# Patient Record
Sex: FEMALE | Race: BLACK OR AFRICAN AMERICAN | NOT HISPANIC OR LATINO | Employment: FULL TIME | URBAN - METROPOLITAN AREA
[De-identification: names, ages, dates, MRNs, and addresses within clinical notes are randomized per-mention and may not be internally consistent; named-entity substitution may affect disease eponyms.]

---

## 2021-07-03 ENCOUNTER — HOSPITAL ENCOUNTER (INPATIENT)
Facility: HOSPITAL | Age: 31
LOS: 8 days | Discharge: HOME | End: 2021-07-12
Attending: EMERGENCY MEDICINE | Admitting: INTERNAL MEDICINE

## 2021-07-03 ENCOUNTER — APPOINTMENT (OUTPATIENT)
Dept: CT IMAGING | Facility: HOSPITAL | Age: 31
End: 2021-07-03

## 2021-07-03 DIAGNOSIS — R10.30 LOWER ABDOMINAL PAIN: ICD-10-CM

## 2021-07-03 DIAGNOSIS — Z90.710 STATUS POST HYSTERECTOMY: ICD-10-CM

## 2021-07-03 DIAGNOSIS — T81.43XA INTRA-ABDOMINAL ABSCESS POST-PROCEDURE: Primary | ICD-10-CM

## 2021-07-03 LAB
ALBUMIN SERPL-MCNC: 3.6 G/DL (ref 3.5–5.2)
ALBUMIN/GLOB SERPL: 1 G/DL
ALP SERPL-CCNC: 77 U/L (ref 39–117)
ALT SERPL W P-5'-P-CCNC: 9 U/L (ref 1–33)
ANION GAP SERPL CALCULATED.3IONS-SCNC: 11 MMOL/L (ref 5–15)
AST SERPL-CCNC: 13 U/L (ref 1–32)
BASOPHILS # BLD AUTO: 0.03 10*3/MM3 (ref 0–0.2)
BASOPHILS NFR BLD AUTO: 0.2 % (ref 0–1.5)
BILIRUB SERPL-MCNC: 1 MG/DL (ref 0–1.2)
BUN SERPL-MCNC: 6 MG/DL (ref 6–20)
BUN/CREAT SERPL: 8.3 (ref 7–25)
CALCIUM SPEC-SCNC: 9.6 MG/DL (ref 8.6–10.5)
CHLORIDE SERPL-SCNC: 100 MMOL/L (ref 98–107)
CO2 SERPL-SCNC: 28 MMOL/L (ref 22–29)
CREAT SERPL-MCNC: 0.72 MG/DL (ref 0.57–1)
DEPRECATED RDW RBC AUTO: 43 FL (ref 37–54)
EOSINOPHIL # BLD AUTO: 0.18 10*3/MM3 (ref 0–0.4)
EOSINOPHIL NFR BLD AUTO: 1.3 % (ref 0.3–6.2)
ERYTHROCYTE [DISTWIDTH] IN BLOOD BY AUTOMATED COUNT: 12.4 % (ref 12.3–15.4)
GFR SERPL CREATININE-BSD FRML MDRD: 114 ML/MIN/1.73
GLOBULIN UR ELPH-MCNC: 3.6 GM/DL
GLUCOSE SERPL-MCNC: 99 MG/DL (ref 65–99)
HCT VFR BLD AUTO: 37.2 % (ref 34–46.6)
HGB BLD-MCNC: 12.2 G/DL (ref 12–15.9)
HOLD SPECIMEN: NORMAL
HOLD SPECIMEN: NORMAL
IMM GRANULOCYTES # BLD AUTO: 0.09 10*3/MM3 (ref 0–0.05)
IMM GRANULOCYTES NFR BLD AUTO: 0.6 % (ref 0–0.5)
LIPASE SERPL-CCNC: 11 U/L (ref 13–60)
LYMPHOCYTES # BLD AUTO: 0.48 10*3/MM3 (ref 0.7–3.1)
LYMPHOCYTES NFR BLD AUTO: 3.3 % (ref 19.6–45.3)
MCH RBC QN AUTO: 30.7 PG (ref 26.6–33)
MCHC RBC AUTO-ENTMCNC: 32.8 G/DL (ref 31.5–35.7)
MCV RBC AUTO: 93.5 FL (ref 79–97)
MONOCYTES # BLD AUTO: 0.71 10*3/MM3 (ref 0.1–0.9)
MONOCYTES NFR BLD AUTO: 4.9 % (ref 5–12)
NEUTROPHILS NFR BLD AUTO: 12.91 10*3/MM3 (ref 1.7–7)
NEUTROPHILS NFR BLD AUTO: 89.7 % (ref 42.7–76)
NRBC BLD AUTO-RTO: 0 /100 WBC (ref 0–0.2)
PLATELET # BLD AUTO: 175 10*3/MM3 (ref 140–450)
PMV BLD AUTO: 11.2 FL (ref 6–12)
POTASSIUM SERPL-SCNC: 3.5 MMOL/L (ref 3.5–5.2)
PROT SERPL-MCNC: 7.2 G/DL (ref 6–8.5)
RBC # BLD AUTO: 3.98 10*6/MM3 (ref 3.77–5.28)
SODIUM SERPL-SCNC: 139 MMOL/L (ref 136–145)
WBC # BLD AUTO: 14.4 10*3/MM3 (ref 3.4–10.8)
WHOLE BLOOD HOLD SPECIMEN: NORMAL

## 2021-07-03 PROCEDURE — 25010000002 IOPAMIDOL 61 % SOLUTION: Performed by: EMERGENCY MEDICINE

## 2021-07-03 PROCEDURE — 83690 ASSAY OF LIPASE: CPT | Performed by: EMERGENCY MEDICINE

## 2021-07-03 PROCEDURE — 80053 COMPREHEN METABOLIC PANEL: CPT | Performed by: EMERGENCY MEDICINE

## 2021-07-03 PROCEDURE — 85025 COMPLETE CBC W/AUTO DIFF WBC: CPT | Performed by: EMERGENCY MEDICINE

## 2021-07-03 PROCEDURE — 83605 ASSAY OF LACTIC ACID: CPT | Performed by: EMERGENCY MEDICINE

## 2021-07-03 PROCEDURE — 99284 EMERGENCY DEPT VISIT MOD MDM: CPT

## 2021-07-03 PROCEDURE — 84145 PROCALCITONIN (PCT): CPT | Performed by: EMERGENCY MEDICINE

## 2021-07-03 PROCEDURE — 74177 CT ABD & PELVIS W/CONTRAST: CPT

## 2021-07-03 PROCEDURE — 25010000002 ONDANSETRON PER 1 MG: Performed by: EMERGENCY MEDICINE

## 2021-07-03 PROCEDURE — 25010000002 HYDROMORPHONE PER 4 MG: Performed by: EMERGENCY MEDICINE

## 2021-07-03 RX ORDER — ONDANSETRON 2 MG/ML
4 INJECTION INTRAMUSCULAR; INTRAVENOUS
Status: DISCONTINUED | OUTPATIENT
Start: 2021-07-03 | End: 2021-07-04 | Stop reason: SDUPTHER

## 2021-07-03 RX ORDER — HYDROMORPHONE HYDROCHLORIDE 1 MG/ML
0.5 INJECTION, SOLUTION INTRAMUSCULAR; INTRAVENOUS; SUBCUTANEOUS
Status: DISCONTINUED | OUTPATIENT
Start: 2021-07-03 | End: 2021-07-04 | Stop reason: HOSPADM

## 2021-07-03 RX ORDER — SODIUM CHLORIDE 9 MG/ML
10 INJECTION INTRAVENOUS AS NEEDED
Status: DISCONTINUED | OUTPATIENT
Start: 2021-07-03 | End: 2021-07-09

## 2021-07-03 RX ADMIN — SODIUM CHLORIDE 1000 ML: 9 INJECTION, SOLUTION INTRAVENOUS at 23:06

## 2021-07-03 RX ADMIN — HYDROMORPHONE HYDROCHLORIDE 0.5 MG: 1 INJECTION, SOLUTION INTRAMUSCULAR; INTRAVENOUS; SUBCUTANEOUS at 23:16

## 2021-07-03 RX ADMIN — ONDANSETRON 4 MG: 2 INJECTION INTRAMUSCULAR; INTRAVENOUS at 23:08

## 2021-07-03 RX ADMIN — IOPAMIDOL 100 ML: 612 INJECTION, SOLUTION INTRAVENOUS at 23:33

## 2021-07-04 PROBLEM — T81.43XA INTRA-ABDOMINAL ABSCESS POST-PROCEDURE: Status: ACTIVE | Noted: 2021-07-04

## 2021-07-04 PROBLEM — K65.1 INTRA-ABDOMINAL ABSCESS POST-PROCEDURE: Status: ACTIVE | Noted: 2021-07-04

## 2021-07-04 LAB
ABO GROUP BLD: NORMAL
APTT PPP: 29.9 SECONDS (ref 22–39)
BACTERIA UR QL AUTO: ABNORMAL /HPF
BILIRUB UR QL STRIP: NEGATIVE
BLD GP AB SCN SERPL QL: NEGATIVE
CLARITY UR: ABNORMAL
COLOR UR: YELLOW
D-LACTATE SERPL-SCNC: 0.8 MMOL/L (ref 0.5–2)
GLUCOSE UR STRIP-MCNC: NEGATIVE MG/DL
HGB UR QL STRIP.AUTO: NEGATIVE
HOLD SPECIMEN: NORMAL
HYALINE CASTS UR QL AUTO: ABNORMAL /LPF
INR PPP: 1.22 (ref 0.85–1.16)
KETONES UR QL STRIP: NEGATIVE
LEUKOCYTE ESTERASE UR QL STRIP.AUTO: NEGATIVE
NITRITE UR QL STRIP: NEGATIVE
PH UR STRIP.AUTO: 6 [PH] (ref 5–8)
PROCALCITONIN SERPL-MCNC: 0.3 NG/ML (ref 0–0.25)
PROT UR QL STRIP: NEGATIVE
PROTHROMBIN TIME: 15 SECONDS (ref 11.4–14.4)
RBC # UR: ABNORMAL /HPF
REF LAB TEST METHOD: ABNORMAL
RH BLD: POSITIVE
SARS-COV-2 RNA RESP QL NAA+PROBE: NOT DETECTED
SP GR UR STRIP: 1.03 (ref 1–1.03)
SQUAMOUS #/AREA URNS HPF: ABNORMAL /HPF
T&S EXPIRATION DATE: NORMAL
UROBILINOGEN UR QL STRIP: ABNORMAL
WBC UR QL AUTO: ABNORMAL /HPF

## 2021-07-04 PROCEDURE — 25010000002 PIPERACILLIN SOD-TAZOBACTAM PER 1 G: Performed by: EMERGENCY MEDICINE

## 2021-07-04 PROCEDURE — 25010000002 ONDANSETRON PER 1 MG: Performed by: INTERNAL MEDICINE

## 2021-07-04 PROCEDURE — 87040 BLOOD CULTURE FOR BACTERIA: CPT | Performed by: INTERNAL MEDICINE

## 2021-07-04 PROCEDURE — 25010000002 HYDROMORPHONE PER 4 MG: Performed by: INTERNAL MEDICINE

## 2021-07-04 PROCEDURE — 25010000002 HYDROMORPHONE PER 4 MG: Performed by: EMERGENCY MEDICINE

## 2021-07-04 PROCEDURE — 99253 IP/OBS CNSLTJ NEW/EST LOW 45: CPT | Performed by: OBSTETRICS & GYNECOLOGY

## 2021-07-04 PROCEDURE — 86850 RBC ANTIBODY SCREEN: CPT | Performed by: INTERNAL MEDICINE

## 2021-07-04 PROCEDURE — 86900 BLOOD TYPING SEROLOGIC ABO: CPT | Performed by: INTERNAL MEDICINE

## 2021-07-04 PROCEDURE — 99223 1ST HOSP IP/OBS HIGH 75: CPT | Performed by: INTERNAL MEDICINE

## 2021-07-04 PROCEDURE — 85610 PROTHROMBIN TIME: CPT | Performed by: INTERNAL MEDICINE

## 2021-07-04 PROCEDURE — 25010000002 VANCOMYCIN 10 G RECONSTITUTED SOLUTION

## 2021-07-04 PROCEDURE — 25010000002 VANCOMYCIN 10 G RECONSTITUTED SOLUTION: Performed by: EMERGENCY MEDICINE

## 2021-07-04 PROCEDURE — U0003 INFECTIOUS AGENT DETECTION BY NUCLEIC ACID (DNA OR RNA); SEVERE ACUTE RESPIRATORY SYNDROME CORONAVIRUS 2 (SARS-COV-2) (CORONAVIRUS DISEASE [COVID-19]), AMPLIFIED PROBE TECHNIQUE, MAKING USE OF HIGH THROUGHPUT TECHNOLOGIES AS DESCRIBED BY CMS-2020-01-R: HCPCS | Performed by: INTERNAL MEDICINE

## 2021-07-04 PROCEDURE — 25010000002 PIPERACILLIN SOD-TAZOBACTAM PER 1 G: Performed by: INTERNAL MEDICINE

## 2021-07-04 PROCEDURE — 81001 URINALYSIS AUTO W/SCOPE: CPT | Performed by: INTERNAL MEDICINE

## 2021-07-04 PROCEDURE — 86901 BLOOD TYPING SEROLOGIC RH(D): CPT | Performed by: INTERNAL MEDICINE

## 2021-07-04 PROCEDURE — 85730 THROMBOPLASTIN TIME PARTIAL: CPT | Performed by: INTERNAL MEDICINE

## 2021-07-04 RX ORDER — SODIUM CHLORIDE 0.9 % (FLUSH) 0.9 %
10 SYRINGE (ML) INJECTION EVERY 12 HOURS SCHEDULED
Status: DISCONTINUED | OUTPATIENT
Start: 2021-07-04 | End: 2021-07-09

## 2021-07-04 RX ORDER — ACETAMINOPHEN 160 MG/5ML
650 SOLUTION ORAL EVERY 4 HOURS PRN
Status: DISCONTINUED | OUTPATIENT
Start: 2021-07-04 | End: 2021-07-09

## 2021-07-04 RX ORDER — SODIUM CHLORIDE 9 MG/ML
100 INJECTION, SOLUTION INTRAVENOUS CONTINUOUS
Status: DISCONTINUED | OUTPATIENT
Start: 2021-07-04 | End: 2021-07-11

## 2021-07-04 RX ORDER — HYDROMORPHONE HYDROCHLORIDE 1 MG/ML
0.5 INJECTION, SOLUTION INTRAMUSCULAR; INTRAVENOUS; SUBCUTANEOUS
Status: DISCONTINUED | OUTPATIENT
Start: 2021-07-04 | End: 2021-07-10 | Stop reason: SDUPTHER

## 2021-07-04 RX ORDER — ACETAMINOPHEN 650 MG/1
650 SUPPOSITORY RECTAL EVERY 4 HOURS PRN
Status: DISCONTINUED | OUTPATIENT
Start: 2021-07-04 | End: 2021-07-09

## 2021-07-04 RX ORDER — ONDANSETRON 4 MG/1
4 TABLET, FILM COATED ORAL EVERY 6 HOURS PRN
Status: DISCONTINUED | OUTPATIENT
Start: 2021-07-04 | End: 2021-07-09

## 2021-07-04 RX ORDER — ACETAMINOPHEN 325 MG/1
650 TABLET ORAL EVERY 4 HOURS PRN
Status: DISCONTINUED | OUTPATIENT
Start: 2021-07-04 | End: 2021-07-09

## 2021-07-04 RX ORDER — ONDANSETRON 2 MG/ML
4 INJECTION INTRAMUSCULAR; INTRAVENOUS EVERY 6 HOURS PRN
Status: DISCONTINUED | OUTPATIENT
Start: 2021-07-04 | End: 2021-07-09

## 2021-07-04 RX ORDER — SODIUM CHLORIDE 0.9 % (FLUSH) 0.9 %
1-10 SYRINGE (ML) INJECTION AS NEEDED
Status: DISCONTINUED | OUTPATIENT
Start: 2021-07-04 | End: 2021-07-09

## 2021-07-04 RX ADMIN — SODIUM CHLORIDE, PRESERVATIVE FREE 10 ML: 5 INJECTION INTRAVENOUS at 20:30

## 2021-07-04 RX ADMIN — SODIUM CHLORIDE, PRESERVATIVE FREE 10 ML: 5 INJECTION INTRAVENOUS at 09:01

## 2021-07-04 RX ADMIN — HYDROMORPHONE HYDROCHLORIDE 0.5 MG: 1 INJECTION, SOLUTION INTRAMUSCULAR; INTRAVENOUS; SUBCUTANEOUS at 04:53

## 2021-07-04 RX ADMIN — SODIUM CHLORIDE, PRESERVATIVE FREE 10 ML: 5 INJECTION INTRAVENOUS at 12:43

## 2021-07-04 RX ADMIN — HYDROMORPHONE HYDROCHLORIDE 0.5 MG: 1 INJECTION, SOLUTION INTRAMUSCULAR; INTRAVENOUS; SUBCUTANEOUS at 06:48

## 2021-07-04 RX ADMIN — HYDROMORPHONE HYDROCHLORIDE 0.5 MG: 1 INJECTION, SOLUTION INTRAMUSCULAR; INTRAVENOUS; SUBCUTANEOUS at 20:30

## 2021-07-04 RX ADMIN — TAZOBACTAM SODIUM AND PIPERACILLIN SODIUM 4.5 G: 500; 4 INJECTION, SOLUTION INTRAVENOUS at 20:30

## 2021-07-04 RX ADMIN — HYDROMORPHONE HYDROCHLORIDE 0.5 MG: 1 INJECTION, SOLUTION INTRAMUSCULAR; INTRAVENOUS; SUBCUTANEOUS at 16:38

## 2021-07-04 RX ADMIN — TAZOBACTAM SODIUM AND PIPERACILLIN SODIUM 3.38 G: 375; 3 INJECTION, SOLUTION INTRAVENOUS at 00:27

## 2021-07-04 RX ADMIN — VANCOMYCIN HYDROCHLORIDE 1250 MG: 100 INJECTION, POWDER, LYOPHILIZED, FOR SOLUTION INTRAVENOUS at 11:01

## 2021-07-04 RX ADMIN — ACETAMINOPHEN 650 MG: 325 TABLET, FILM COATED ORAL at 04:54

## 2021-07-04 RX ADMIN — SODIUM CHLORIDE, PRESERVATIVE FREE 10 ML: 5 INJECTION INTRAVENOUS at 16:38

## 2021-07-04 RX ADMIN — VANCOMYCIN HYDROCHLORIDE 1750 MG: 100 INJECTION, POWDER, LYOPHILIZED, FOR SOLUTION INTRAVENOUS at 00:50

## 2021-07-04 RX ADMIN — HYDROMORPHONE HYDROCHLORIDE 0.5 MG: 1 INJECTION, SOLUTION INTRAMUSCULAR; INTRAVENOUS; SUBCUTANEOUS at 12:43

## 2021-07-04 RX ADMIN — TAZOBACTAM SODIUM AND PIPERACILLIN SODIUM 4.5 G: 500; 4 INJECTION, SOLUTION INTRAVENOUS at 11:01

## 2021-07-04 RX ADMIN — HYDROMORPHONE HYDROCHLORIDE 0.5 MG: 1 INJECTION, SOLUTION INTRAMUSCULAR; INTRAVENOUS; SUBCUTANEOUS at 09:01

## 2021-07-04 RX ADMIN — HYDROMORPHONE HYDROCHLORIDE 0.5 MG: 1 INJECTION, SOLUTION INTRAMUSCULAR; INTRAVENOUS; SUBCUTANEOUS at 02:38

## 2021-07-04 RX ADMIN — HYDROMORPHONE HYDROCHLORIDE 0.5 MG: 1 INJECTION, SOLUTION INTRAMUSCULAR; INTRAVENOUS; SUBCUTANEOUS at 22:44

## 2021-07-04 RX ADMIN — SODIUM CHLORIDE 100 ML/HR: 9 INJECTION, SOLUTION INTRAVENOUS at 11:07

## 2021-07-04 RX ADMIN — ACETAMINOPHEN 650 MG: 325 TABLET, FILM COATED ORAL at 20:30

## 2021-07-04 RX ADMIN — ONDANSETRON 4 MG: 2 INJECTION INTRAMUSCULAR; INTRAVENOUS at 13:56

## 2021-07-04 RX ADMIN — TAZOBACTAM SODIUM AND PIPERACILLIN SODIUM 4.5 G: 500; 4 INJECTION, SOLUTION INTRAVENOUS at 04:58

## 2021-07-04 RX ADMIN — ACETAMINOPHEN 650 MG: 325 TABLET, FILM COATED ORAL at 11:22

## 2021-07-04 NOTE — ED PROVIDER NOTES
[place]    EMERGENCY DEPARTMENT ENCOUNTER      Pt Name: Anisa Sosa  MRN: [id number]  YOB: 1990  Date of evaluation: 7/3/2021  Provider: Lito Machuca DO    CHIEF COMPLAINT       Chief Complaint   Patient presents with   • Abdominal Pain         HISTORY OF PRESENT ILLNESS  (Location/Symptom, Timing/Onset, Context/Setting, Quality, Duration, Modifying Factors, Severity.)   Anisa Sosa is a 31 y.o. female who presents to the emergency department for evaluation of increasing lower abdominal pain and she is status post a hysterectomy with cervical removal secondary to enlarged uterus.  This was removed on May 27 with laparoscopic and intravaginal procedure technique.  This was done by patient's OB/GYN in [facility], Dr. Fatmata Varma.  She had been doing well up until last week started having some increasing abdominal pain, decreased bowel movements she was seen at an outside hospital in [place] with blood work and CT scan imaging which she notes revealed some inflammatory changes around the surgical site was started on pain control, nausea control and antibiotics but the patient symptoms continue to progress.  She denies any fevers or chills, but she notes that pain even with the medications is almost unbearable.  Decreased bowel movements over the last few days as well.  No falls, injury or trauma.  She does endorse any pain with trying to use the bathroom, no other significant abdominal surgeries.  She denies any chest pain, shortness of breath.  She has no other acute systemic complaints this time.      Nursing notes were reviewed.    REVIEW OF SYSTEMS    (2-9 systems for level 4, 10 or more for level 5)   ROS:  General:  No fevers, no chills, no weakness  Cardiovascular:  No chest pain, no palpitations  Respiratory:  No shortness of breath, no cough, no wheezing  Gastrointestinal: Positive lower abdominal pain, positive nausea, no vomiting, positive constipation, no  "diarrhea  Musculoskeletal:  No muscle pain, no joint pain  Skin:  No rash  Neurologic:  No headache  Psychiatric:  No anxiety  Genitourinary:  + dysuria, no hematuria    Except as noted above the remainder of the review of systems was reviewed and negative.       PAST MEDICAL HISTORY   No past medical history on file.      SURGICAL HISTORY     No past surgical history on file.      CURRENT MEDICATIONS       Current Facility-Administered Medications:   •  HYDROmorphone (DILAUDID) injection 0.5 mg, 0.5 mg, Intravenous, Q30 Min PRN, Lito Machuca DO, 0.5 mg at 07/03/21 2316  •  ondansetron (ZOFRAN) injection 4 mg, 4 mg, Intravenous, Q30 Min PRN, Lito Machuca DO, 4 mg at 07/03/21 2308  •  Sodium Chloride (PF) 0.9 % 10 mL, 10 mL, Intravenous, PRN, Lito Machuca DO  •  vancomycin 1750 mg/500 mL 0.9% NS IVPB ([facility]), 20 mg/kg, Intravenous, Once, Lito Machuca DO, 1,750 mg at 07/04/21 0050  No current outpatient medications on file.    ALLERGIES     Patient has no known allergies.    FAMILY HISTORY     No family history on file.       SOCIAL HISTORY       Social History     Socioeconomic History   • Marital status: Single     Spouse name: Not on file   • Number of children: Not on file   • Years of education: Not on file   • Highest education level: Not on file         PHYSICAL EXAM    (up to 7 for level 4, 8 or more for level 5)     Vitals:    07/03/21 2224 07/03/21 2315 07/04/21 0000 07/04/21 0030   BP: 130/91      BP Location: Left arm      Patient Position: Sitting      Pulse: 106      Resp: 20      Temp: 98.4 °F (36.9 °C)      TempSrc: Oral      SpO2: 99% 100% 98% 99%   Weight: 83.9 kg (185 lb)      Height: 177.8 cm (70\")          Physical Exam  General : Patient is awake, alert, oriented, in a moderate painful distress, tearful during examination  HEENT: Pupils are equally round and reactive to light, EOMI, conjunctivae clear, sclerae white, there is no injection no icterus.  Oral " mucosa is moist, no exudate. Uvula is midline  Neck: Neck is supple, full range of motion, trachea midline  Cardiac: Heart r tachycardic rate regular rhythm, no murmurs, rubs, or gallops  Lungs: Lungs are clear to auscultation, there is no wheezing, rhonchi, or rales. There is no use of accessory muscles  Chest wall: There is no tenderness to palpation over the chest wall or over ribs  Abdomen: Abdomen is soft, nondistended.  There is discomfort diffusely throughout the abdomen periumbilical and lower abdominal region with subjective guarding.  Bowel sounds are present but hypoactive.  Musculoskeletal: 5 out of 5 strength in all 4 extremities.  No focal muscle deficits are appreciated  Neuro: Motor intact, sensory intact, level of consciousness is normal  Dermatology: Skin is warm and dry  Psych: Mentation is grossly normal, cognition is grossly normal. Affect is tearful, anxious      DIAGNOSTIC RESULTS     EKG: All EKG's are interpreted by the Emergency Department Physician who either signs or Co-signs this chart in the absence of a cardiologist.    No orders to display       RADIOLOGY:   Non-plain film images such as CT, Ultrasound and MRI are read by the radiologist. Plain radiographic images are visualized and preliminarily interpreted by the emergency physician with the below findings:      [] Radiologist's Report Reviewed:  CT Abdomen Pelvis With Contrast   Final Result   1.  Postop hysterectomy with extensive peritoneal inflammation throughout the pelvis. There are multiple small peripherally enhancing interloop fluid collections scattered within the low pelvis concerning for interloop abscesses. Most of these measure   only 1 to 2 cm. Largest low midline prerectal collection measures up to 5.7 cm.   2.  Soft tissue thickening of the vaginal cuff but no obvious evidence of cuff dehiscence. There is no abscess or free air at the cuff. No air within the bladder.   3.  No evidence of upper urinary tract  obstruction.      Signer Name: William Cox MD    Signed: 7/3/2021 11:52 PM    Workstation Name: [id number]     [facility]            ED BEDSIDE ULTRASOUND:   Performed by ED Physician - none    LABS:    I have reviewed and interpreted all of the currently available lab results from this visit (if applicable):  Results for orders placed or performed during the hospital encounter of 07/03/21   Comprehensive Metabolic Panel    Specimen: Blood   Result Value Ref Range    Glucose 99 65 - 99 mg/dL    BUN 6 6 - 20 mg/dL    Creatinine 0.72 0.57 - 1.00 mg/dL    Sodium 139 136 - 145 mmol/L    Potassium 3.5 3.5 - 5.2 mmol/L    Chloride 100 98 - 107 mmol/L    CO2 28.0 22.0 - 29.0 mmol/L    Calcium 9.6 8.6 - 10.5 mg/dL    Total Protein 7.2 6.0 - 8.5 g/dL    Albumin 3.60 3.50 - 5.20 g/dL    ALT (SGPT) 9 1 - 33 U/L    AST (SGOT) 13 1 - 32 U/L    Alkaline Phosphatase 77 39 - 117 U/L    Total Bilirubin 1.0 0.0 - 1.2 mg/dL    eGFR  African Amer 114 >60 mL/min/1.73    Globulin 3.6 gm/dL    A/G Ratio 1.0 g/dL    BUN/Creatinine Ratio 8.3 7.0 - 25.0    Anion Gap 11.0 5.0 - 15.0 mmol/L   Lipase    Specimen: Blood   Result Value Ref Range    Lipase 11 (L) 13 - 60 U/L   CBC Auto Differential    Specimen: Blood   Result Value Ref Range    WBC 14.40 (H) 3.40 - 10.80 10*3/mm3    RBC 3.98 3.77 - 5.28 10*6/mm3    Hemoglobin 12.2 12.0 - 15.9 g/dL    Hematocrit 37.2 34.0 - 46.6 %    MCV 93.5 79.0 - 97.0 fL    MCH 30.7 26.6 - 33.0 pg    MCHC 32.8 31.5 - 35.7 g/dL    RDW 12.4 12.3 - 15.4 %    RDW-SD 43.0 37.0 - 54.0 fl    MPV 11.2 6.0 - 12.0 fL    Platelets 175 140 - 450 10*3/mm3    Neutrophil % 89.7 (H) 42.7 - 76.0 %    Lymphocyte % 3.3 (L) 19.6 - 45.3 %    Monocyte % 4.9 (L) 5.0 - 12.0 %    Eosinophil % 1.3 0.3 - 6.2 %    Basophil % 0.2 0.0 - 1.5 %    Immature Grans % 0.6 (H) 0.0 - 0.5 %    Neutrophils, Absolute 12.91 (H) 1.70 - 7.00 10*3/mm3    Lymphocytes, Absolute 0.48 (L) 0.70 - 3.10 10*3/mm3    Monocytes,  "Absolute 0.71 0.10 - 0.90 10*3/mm3    Eosinophils, Absolute 0.18 0.00 - 0.40 10*3/mm3    Basophils, Absolute 0.03 0.00 - 0.20 10*3/mm3    Immature Grans, Absolute 0.09 (H) 0.00 - 0.05 10*3/mm3    nRBC 0.0 0.0 - 0.2 /100 WBC   Lactic Acid, Plasma    Specimen: Blood   Result Value Ref Range    Lactate 0.8 0.5 - 2.0 mmol/L   Procalcitonin    Specimen: Blood   Result Value Ref Range    Procalcitonin 0.30 (H) 0.00 - 0.25 ng/mL   Green Top (Gel)   Result Value Ref Range    Extra Tube Hold for add-ons.    Lavender Top   Result Value Ref Range    Extra Tube hold for add-on    Gold Top - SST   Result Value Ref Range    Extra Tube Hold for add-ons.         All other labs were within normal range or not returned as of this dictation.      EMERGENCY DEPARTMENT COURSE and DIFFERENTIAL DIAGNOSIS/MDM:   Vitals:    Vitals:    07/03/21 2224 07/03/21 2315 07/04/21 0000 07/04/21 0030   BP: 130/91      BP Location: Left arm      Patient Position: Sitting      Pulse: 106      Resp: 20      Temp: 98.4 °F (36.9 °C)      TempSrc: Oral      SpO2: 99% 100% 98% 99%   Weight: 83.9 kg (185 lb)      Height: 177.8 cm (70\")               Patient with a recent hysterectomy who has had increasing abdominal pain for last 1 week.  She is deafly tender on palpation, she is anxious and tearful secondary to the continued pain.  She has been on IV antibiotics and pain control over the last few days but her symptoms have continued to progress.  With this continued progression and per significant amount of discomfort we did obtain IV, labs, CT scan imaging and repeat blood work.  Results reviewed as above.  She does have a leukocytosis, left shift, the patient was placed on broad-spectrum antibiotics.  CT scan imaging reveals multiple small inflammatory infectious areas with multiple abscesses around the area of her surgical intervention.  With these findings I did attempt to call the patient's OB/GYN surgeon Dr. Fatmata Varma.  Left few voicemail messages " to update her on the current findings and the plan of care moving forward.  Her contact information and cell phone number is [phone].  Patient was updated on the current plan of care and findings, she would like to stay at our facility for further work-up and treatment, understanding that her surgeon is at a different facility.  Will likely need to be followed closely if there is need for other intervention with either our OB/GYN's or back with her surgical specialist at [facility].  Case was discussed with our hospitalist team for admission.          MEDICATIONS ADMINISTERED IN ED:  Medications   Sodium Chloride (PF) 0.9 % 10 mL (has no administration in time range)   ondansetron (ZOFRAN) injection 4 mg (4 mg Intravenous Given 7/3/21 2308)   HYDROmorphone (DILAUDID) injection 0.5 mg (0.5 mg Intravenous Given 7/3/21 2316)   vancomycin 1750 mg/500 mL 0.9% NS IVPB ([facility]) (1,750 mg Intravenous New Bag 7/4/21 0050)   sodium chloride 0.9 % bolus 1,000 mL (1,000 mL Intravenous New Bag 7/3/21 2306)   iopamidol (ISOVUE-300) 61 % injection 100 mL (100 mL Intravenous Given 7/3/21 2333)   piperacillin-tazobactam (ZOSYN) 3.375 g in iso-osmotic dextrose 50 ml (premix) (3.375 g Intravenous New Bag 7/4/21 0027)       PROCEDURES:  Procedures    CRITICAL CARE TIME    Total Critical Care time was 0 minutes, excluding separately reportable procedures.   There was a high probability of clinically significant/life threatening deterioration in the patient's condition which required my urgent intervention.      FINAL IMPRESSION      1. Intra-abdominal abscess post-procedure    2. Lower abdominal pain    3. Status post hysterectomy          DISPOSITION/PLAN     ED Disposition     ED Disposition Condition Comment    Decision to Admit              Comment: Please note this report has been produced using speech recognition software.      Lito Machuca DO  Attending Emergency Physician               Lito Machuca,    07/04/21 0133

## 2021-07-04 NOTE — PLAN OF CARE
Goal Outcome Evaluation:  Plan of Care Reviewed With: patient        Progress: no change  Outcome Summary: Patient arrieved to floor this shift. Abd tender and patient rates pain 7/10. PRN dilaudid given. Tempature 100.5 F on arrival to floor. Tylenol given

## 2021-07-04 NOTE — CONSULTS
[facility]  Anisa Sosa  : 1990  MRN: [id number]  CSN: [id number]    Consult Requested By: Dr. Martinez   Consulting Service: Gynecology   Reason for Consultation: Post op hyst pelvic abscess    Date of consultation: 2021       Subjective   Chief Complaint   Patient presents with   • Abdominal Pain     Anisa Sosa is a 31 y.o. year old  who is POD#38 status post laparoscopic vaginal assisted hysterectomy per patient done by Dr. Varma in [place] on May 27.  Patient reports this procedure was done for history of heavy menstrual cycles fibroids ovarian cyst and pelvic pain.  She reports her ovaries were retained.  She reports she stayed in the hospital 1 night after the procedure and was discharged to home without complications.  She reports about 5 days later she followed up outpatient for routine postoperative visit and a urine test was done and she was sent home with an antibiotic which sounds like with Cipro.  She reports at that time she was doing overall okay with some mild soreness.  She denies having any type of pelvic exam done at that visit.  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  She reports last  she took her daughter to a tournament in [place] and started having fatigue and then into Tuesday p.m. started having extreme abdominal pain with dizziness and lightheadedness.  She reports things continued and on Wednesday evening she presented to the emergency room in [facility].  She reports she had tried to self medicate with ibuprofen every 8 hours at home and heating pad without relief.  She reports her main presenting complaints that time were pelvic pressure bloating and it hurt to take a deep breath.  She reports at the ER in [place] she had a CT scan which per her report demonstrated some inflammation and she was sent home with 5 mg of Lortab ibuprofen Zofran and Cipro.  She reports the last time she had a bowel  movement was Tuesday and has had some gas pain.  She reports the last time she had any emesis was Thursday.  She reports on Friday she started having increase in pain and gas pain with progression into Saturday which caused her to present to the ER here yesterday evening.  CT scan here at our hospital demonstrates some interloop abscesses 1 to 2 cm in size and a 5 cm abscess closer to the rectal area.  She reports that she has pelvic pressure and rectal pressure when she has to urinate.  She has tolerated a regular diet since being admitted to the floor.  She reports her pain is better controlled with pain medications since being admitted.    Past Medical History:   Diagnosis Date   • Anemia    • History of chlamydia    • History of trichomonal vaginitis      Past Surgical History:   Procedure Laterality Date   •  SECTION     •  SECTION     •  SECTION WITH TUBAL     • LAPAROSCOPIC ASSISTED VAGINAL HYSTERECTOMY  2021    for AUB-L, pelvic pain. Ovaries retained per patient. Dr. Avani Horta.      OB History    Para Term  AB Living   3 3 3 0 0 3   SAB TAB Ectopic Molar Multiple Live Births   0 0 0 0 0 3      # Outcome Date GA Lbr Adam/2nd Weight Sex Delivery Anes PTL Lv   3 Term    3402 g (7 lb 8 oz) F CS-Unspec   SRIKANTH   2 Term    3572 g (7 lb 14 oz) F CS-Unspec   SRIKANTH   1 Term    3629 g (8 lb) F CS-Unspec   SRIKANTH      Obstetric Comments   History tubal with 3rd c/s   History of chlamydia and trichomonas in early 20s     Social History    Tobacco Use      Smoking status: Former Smoker      Smokeless tobacco: Never Used      Tobacco comment: off and on for 3 years   Occasional ETOH use  No illicit drug use     Current Facility-Administered Medications:   •  [START ON 2021] ! Vancomycin trough level before 12:00 Noon dose on 21; hold dose until level checked by a pharmacist, , Does not apply, Once, Good Ramirez RPH  •  acetaminophen  (TYLENOL) tablet 650 mg, 650 mg, Oral, Q4H PRN, 650 mg at 07/04/21 1122 **OR** acetaminophen (TYLENOL) 160 MG/5ML solution 650 mg, 650 mg, Oral, Q4H PRN **OR** acetaminophen (TYLENOL) suppository 650 mg, 650 mg, Rectal, Q4H PRN, Tierney Martinez MD  •  HYDROmorphone (DILAUDID) injection 0.5 mg, 0.5 mg, Intravenous, Q2H PRN, Tierney Martinez MD, 0.5 mg at 07/04/21 0901  •  ondansetron (ZOFRAN) tablet 4 mg, 4 mg, Oral, Q6H PRN **OR** ondansetron (ZOFRAN) injection 4 mg, 4 mg, Intravenous, Q6H PRN, Tierney Martinez MD  •  Pharmacy to dose vancomycin, , Does not apply, Continuous PRN, Tierney Martinez MD  •  piperacillin-tazobactam (ZOSYN) 4.5 g in iso-osmotic dextrose 100 mL IVPB (premix), 4.5 g, Intravenous, Q8H, Tierney Martinez MD, 4.5 g at 07/04/21 1101  •  Sodium Chloride (PF) 0.9 % 10 mL, 10 mL, Intravenous, PRN, Tierney Martinez MD  •  sodium chloride 0.9 % flush 1-10 mL, 1-10 mL, Intravenous, PRN, Tierney Martinez MD  •  sodium chloride 0.9 % flush 10 mL, 10 mL, Intravenous, Q12H, Tierney Martinez MD, 10 mL at 07/04/21 0901  •  sodium chloride 0.9 % infusion, 100 mL/hr, Intravenous, Continuous, Walter Prieto MD, Last Rate: 100 mL/hr at 07/04/21 1107, 100 mL/hr at 07/04/21 1107  •  vancomycin 1250 mg/250 mL 0.9% NS IVPB ([facility]), 1,250 mg, Intravenous, Q12H, Good Ramirez [facility], 1,250 mg at 07/04/21 1101    No Known Allergies    Review of Systems   Constitutional: Negative for chills and fever.   HENT: Negative for congestion and sore throat.    Respiratory: Negative for shortness of breath and wheezing.    Cardiovascular: Negative for chest pain and palpitations.   Gastrointestinal: Positive for abdominal distention, abdominal pain and nausea. Negative for vomiting.   Genitourinary: Negative for frequency, vaginal bleeding and vaginal pain.   Musculoskeletal: Negative for back pain and myalgias.   Neurological: Negative for dizziness, light-headedness and headaches.   Psychiatric/Behavioral: Negative for  "confusion. The patient is not nervous/anxious.          Objective   /72 (BP Location: Left arm, Patient Position: Sitting)   Pulse 97   Temp 99 °F (37.2 °C) (Oral)   Resp 17   Ht 177.8 cm (70\")   Wt 86.5 kg (190 lb 12.8 oz)   LMP 05/20/2021   SpO2 97%   BMI 27.38 kg/m²   General: well developed; well nourished  no acute distress   Heart: Not performed.   Lungs: breathing is unlabored   Abdomen: no umbilical or inguinal hernias are present  no hepato-splenomegaly  Mildly TTP in lower quadrants. Hypoactive bowel sounds. No rebound or guarding    Pelvis:: Not performed.   Labs  ABO & Rh: No results found for: LABABO, LABRH, ABID  Amylase & Lipase:   Lab Results   Component Value Date    LIPASE 11 (L) 07/03/2021     BMP:   Lab Results   Component Value Date     07/03/2021    K 3.5 07/03/2021     07/03/2021    CO2 28.0 07/03/2021    BUN 6 07/03/2021    CREATININE 0.72 07/03/2021     CBC:   Lab Results   Component Value Date     07/03/2021    HGB 12.2 07/03/2021    HCT 37.2 07/03/2021    WBC 14.40 (H) 07/03/2021     CBC w/ diff:   Lab Results   Component Value Date     07/03/2021    HGB 12.2 07/03/2021    HCT 37.2 07/03/2021    MCV 93.5 07/03/2021    RDW 12.4 07/03/2021    WBC 14.40 (H) 07/03/2021    NEUTRORELPCT 89.7 (H) 07/03/2021    AUTOIGPER 0.6 (H) 07/03/2021    LYMPHORELPCT 3.3 (L) 07/03/2021    MONORELPCT 4.9 (L) 07/03/2021    EOSRELPCT 1.3 07/03/2021    BASORELPCT 0.2 07/03/2021     CMP:   Lab Results   Component Value Date     07/03/2021    K 3.5 07/03/2021     07/03/2021    CO2 28.0 07/03/2021    BUN 6 07/03/2021    CREATININE 0.72 07/03/2021    GLUCOSE 99 07/03/2021    ALBUMIN 3.60 07/03/2021    CALCIUM 9.6 07/03/2021    AST 13 07/03/2021    ALT 9 07/03/2021    BILITOT 1.0 07/03/2021     Coags:   Lab Results   Component Value Date    PROTIME 15.0 (H) 07/04/2021    INR 1.22 (H) 07/04/2021    PTT 29.9 07/04/2021     UA:  No results found for: TOMMIE, " SPECGRAVUR, KETONESU, BLOODU, LEUKOCYTESUR, NITRITEU, RBCUA, WBCUA, BACTERIA  Urine Culture: No results found for: URINECX    Imaging Reviewed  CT AP 7/3/21  IMPRESSION:  1.  Postop hysterectomy with extensive peritoneal inflammation throughout the pelvis. There are multiple small peripherally enhancing interloop fluid collections scattered within the low pelvis concerning for interloop abscesses. Most of these measure  only 1 to 2 cm. Largest low midline prerectal collection measures up to 5.7 cm.  2.  Soft tissue thickening of the vaginal cuff but no obvious evidence of cuff dehiscence. There is no abscess or free air at the cuff. No air within the bladder.  3.  No evidence of upper urinary tract obstruction.       Assessment   1. POD#38 s/p LAVH/BS for AUB-L, pelvic pain per patient.   2. Post operative pelvic abscesses   3. Most likely improving ileus      Plan   1. Agree with IV vanc/zosyn  2. Agree with contacting IR to attempt drainage of 5cm abscess  3. Would continue abx for at least 48 hours afebrile  4. Surgical intervention only if worsening symptoms, labs on IV abx  5. Would recommend obtaining records from surgery - I.e. operative report from hysterectomy   6. Will attempt to contact patient's primary gynecologist to update them.     My findings from today's consultation along with recommendations and plan of care have been discussed with patient and Dr. Prieto.     Amee Robertson MD  7/4/2021  12:11 EDT

## 2021-07-04 NOTE — PROGRESS NOTES
[facility] Medicine Services  PROGRESS NOTE    Patient Name: Anisa Sosa  : 1990  MRN: [id number]    Date of Admission: 7/3/2021  Primary Care Physician: Provider, No Known    Subjective   Subjective     CC:  Abdominal pain    HPI:  Abdominal pain improved today compared to last night, but still experiencing fairly significant discomfort in the lower quadrants.  Trying to eat a little bit, no nausea, but not hungry.    ROS:  Gen- No fevers, chills  CV- No chest pain, palpitations  Resp- No cough, dyspnea  GI- + abd pain        Objective   Objective     Vital Signs:   Temp:  [98.4 °F (36.9 °C)-100.5 °F (38.1 °C)] 99 °F (37.2 °C)  Heart Rate:  [] 97  Resp:  [17-20] 17  BP: (114-130)/(72-91) 114/72        Physical Exam:  Constitutional - no acute distress, nontoxic, in bed  HEENT-NCAT, mucous membranes moist  CV-RRR, S1 S2 normal, no m/r/g  Resp-CTAB, no wheezes, rhonchi or rales  Abd-soft, mild generalized tenderness, nondistended, normoactive bowel sounds  Ext-No lower extremity cyanosis, clubbing or edema bilaterally  Neuro-alert and oriented, speech clear, moves all extremities   Psych-normal affect   Skin- No rash on exposed UE or LE bilaterally    Results Reviewed:  Results from last 7 days   Lab Units 21  0518 21  2230   WBC 10*3/mm3  --  14.40*   HEMOGLOBIN g/dL  --  12.2   HEMATOCRIT %  --  37.2   PLATELETS 10*3/mm3  --  175   INR  1.22*  --    PROCALCITONIN ng/mL  --  0.30*     Results from last 7 days   Lab Units 21  2230   SODIUM mmol/L 139   POTASSIUM mmol/L 3.5   CHLORIDE mmol/L 100   CO2 mmol/L 28.0   BUN mg/dL 6   CREATININE mg/dL 0.72   GLUCOSE mg/dL 99   CALCIUM mg/dL 9.6   ALT (SGPT) U/L 9   AST (SGOT) U/L 13     Estimated Creatinine Clearance: 134.1 mL/min (by C-G formula based on SCr of 0.72 mg/dL).    Microbiology Results Abnormal     Procedure Component Value - Date/Time    COVID PRE-OP / PRE-PROCEDURE SCREENING ORDER (NO ISOLATION)  - Swab, Nasopharynx [[id number]]  (Normal) Collected: 07/04/21 0302    Lab Status: Final result Specimen: Swab from Nasopharynx Updated: 07/04/21 0408    Narrative:      The following orders were created for panel order COVID PRE-OP / PRE-PROCEDURE SCREENING ORDER (NO ISOLATION) - Swab, Nasopharynx.  Procedure                               Abnormality         Status                     ---------                               -----------         ------                     COVID-19,CEPHEID,JORGE IN-...[[id number]]  Normal              Final result                 Please view results for these tests on the individual orders.    COVID-19,CEPHEID,JORGE IN-HOUSE(OR EMERGENT/ADD-ON),NP SWAB IN TRANSPORT MEDIA 3-4 HR TAT - Swab, Nasopharynx [[id number]]  (Normal) Collected: 07/04/21 0302    Lab Status: Final result Specimen: Swab from Nasopharynx Updated: 07/04/21 0408     COVID19 Not Detected    Narrative:      Fact sheet for providers: https://www.fda.gov/media/315817/download     Fact sheet for patients: https://www.fda.gov/media/346751/download  Fact sheet for providers: https://www.fda.gov/media/267991/download     Fact sheet for patients: https://www.fda.gov/media/479436/download          Imaging Results (Last 24 Hours)     Procedure Component Value Units Date/Time    CT Abdomen Pelvis With Contrast [[id number]] Collected: 07/03/21 2352     Updated: 07/03/21 2354    Narrative:      CT ABDOMEN AND PELVIS WITH CONTRAST, 7/3/2021    HISTORY:  31-year-old female one month postop hysterectomy presenting to the ED complaining of low abdomen pain and decreased bowel movements.    TECHNIQUE:  CT imaging of the abdomen and pelvis with IV contrast. Radiation dose reduction techniques included automated exposure control. Radiation audit for CT and nuclear cardiology exams in the last 12 months: 0.    PELVIS FINDINGS:  Postoperative changes of hysterectomy. Edema and soft tissue stranding is seen throughout the pelvic mesentery, and  there are multiple small, scattered rim-enhancing interloop fluid collections within the low pelvis suspicious for multifocal abscess. The  largest collection in the low prerectal midline measures about 5.7 cm (image 73). There is soft tissue thickening of the vaginal cuff, but there is no abscess or air collection in this location. There is no air within the urinary bladder. Rectum is  unremarkable. 3.1 cm left ovary cyst.    ABDOMEN FINDINGS:  Small bowel and colon are normal in caliber with no evidence of bowel obstruction or significant adynamic ileus. No fluid collections are seen within the abdominal peritoneal or retroperitoneal cavities.    Both kidneys are normal in appearance with no evidence of upper urinary tract obstruction on the right or left. Normal renal parenchymal enhancement.    No gallbladder distention or bile duct dilatation. Liver, pancreas and spleen appear normal.    Lung base images show no active disease.      Impression:      1.  Postop hysterectomy with extensive peritoneal inflammation throughout the pelvis. There are multiple small peripherally enhancing interloop fluid collections scattered within the low pelvis concerning for interloop abscesses. Most of these measure  only 1 to 2 cm. Largest low midline prerectal collection measures up to 5.7 cm.  2.  Soft tissue thickening of the vaginal cuff but no obvious evidence of cuff dehiscence. There is no abscess or free air at the cuff. No air within the bladder.  3.  No evidence of upper urinary tract obstruction.    Signer Name: William Cox MD   Signed: 7/3/2021 11:52 PM   Workstation Name: [email or web address]    [facility]              I have reviewed the medications:  Scheduled Meds:[START ON 7/5/2021] Pharmacy Consult, , Does not apply, Once  piperacillin-tazobactam, 4.5 g, Intravenous, Q8H  sodium chloride, 10 mL, Intravenous, Q12H  vancomycin, 1,250 mg, Intravenous, Q12H      Continuous Infusions:Pharmacy  to dose vancomycin,   sodium chloride, 100 mL/hr, Last Rate: 100 mL/hr (07/04/21 1107)      PRN Meds:.acetaminophen **OR** acetaminophen **OR** acetaminophen  •  HYDROmorphone  •  ondansetron **OR** ondansetron  •  Pharmacy to dose vancomycin  •  Sodium Chloride (PF)  •  sodium chloride    Assessment/Plan   Assessment & Plan     Active Hospital Problems    Diagnosis  POA   • Intra-abdominal abscess post-procedure [T81.43XA]  Yes      Resolved Hospital Problems   No resolved problems to display.        Brief Hospital Course to date:  Anisa Sosa is a 31 y.o. female with history of elective laparoscopic hysterectomy in [place] on May 27.  She recently reports increased abdominal pain and anorexia as well as no fevers.  CT abdomen pelvis here shows extensive peritoneal inflammation throughout the pelvis with multiple small peripherally enhancing interloop fluid collections scattered in the low pelvis with the largest measuring 5.7 cm in the perirectal area.    Fever  Leukocytosis  Intra-abdominal abscesses  -Continue vancomycin and Zosyn  -IR guided abscess drainage tomorrow if possible, both for therapeutic and diagnostic purposes  -ID consultation in a.m.  -Pain control  -CBC a.m.  -Discussed treatment plan with gynecology Dr. Robertson      DVT prophylaxis:  Mechanical DVT prophylaxis orders are present.       Disposition: I expect the patient to be discharged home tbd    CODE STATUS:   Code Status and Medical Interventions:   Ordered at: 07/04/21 0418     Level Of Support Discussed With:    Patient     Code Status:    CPR     Medical Interventions (Level of Support Prior to Arrest):    Full       Walter Prieto MD  07/04/21

## 2021-07-04 NOTE — PROGRESS NOTES
Pharmacy Consult-Vancomycin Dosing  Anisa Sosa is a  31 y.o. female receiving vancomycin therapy.     Indication: Post-Op abscess; septic  Consulting Provider: Dr. Martinez  ID Consult:     Goal AUC: 400 - 600 mg/L*hr    Current Antimicrobial Therapy  Anti-Infectives (From admission, onward)      Ordered     Dose/Rate Route Frequency Start Stop    07/04/21 0423  piperacillin-tazobactam (ZOSYN) 4.5 g in iso-osmotic dextrose 100 mL IVPB (premix)     Ordering Provider: Tierney Martinez MD    4.5 g  over 4 Hours Intravenous Every 8 Hours 07/04/21 1200 07/09/21 1159    07/04/21 0430  vancomycin 1250 mg/250 mL 0.9% NS IVPB ([facility])     Ordering Provider: Good Ramirez, [facility]    1,250 mg  over 90 Minutes Intravenous Every 12 Hours 07/04/21 1200 07/11/21 1159    07/04/21 0423  piperacillin-tazobactam (ZOSYN) 4.5 g in iso-osmotic dextrose 100 mL IVPB (premix)     Ordering Provider: Tierney Martinez MD    4.5 g  over 30 Minutes Intravenous Once 07/04/21 0600      07/04/21 0423  Pharmacy to dose vancomycin     Ordering Provider: Tierney Martinez MD     Does not apply Continuous PRN 07/04/21 0421 07/09/21 0420    07/03/21 2355  vancomycin 1750 mg/500 mL 0.9% NS IVPB ([facility])     Ordering Provider: Lito Machuca DO    20 mg/kg × 83.9 kg  over 120 Minutes Intravenous Once 07/03/21 2357 07/04/21 0250    07/03/21 2355  piperacillin-tazobactam (ZOSYN) 3.375 g in iso-osmotic dextrose 50 ml (premix)     Ordering Provider: Lito Machuca DO    3.375 g  over 30 Minutes Intravenous Once 07/03/21 2357 07/04/21 0140            Allergies  Allergies as of 07/03/2021    (No Known Allergies)       Labs    Results from last 7 days   Lab Units 07/03/21  2230   BUN mg/dL 6   CREATININE mg/dL 0.72       Results from last 7 days   Lab Units 07/03/21  2230   WBC 10*3/mm3 14.40*       Evaluation of Dosing     Last Dose Received in the ED/Outside Facility: Vancomycin 1750 mg IV x 1 (7/4/21 00:50)  Is Patient on Dialysis or Renal  "Replacement:     Ht - 177.8 cm (70\")  Wt - 86.5 kg (190 lb 12.8 oz)    Estimated Creatinine Clearance: 134.1 mL/min (by C-G formula based on SCr of 0.72 mg/dL).    Intake & Output (last 3 days)         07/01 0701 - 07/02 0700 07/02 0701 - 07/03 0700 07/03 0701 - 07/04 0700    IV Piggyback   1050    Total Intake(mL/kg)   1050 (12.1)    Net   +1050                   Microbiology and Radiology  Microbiology Results (last 10 days)       Procedure Component Value - Date/Time    COVID PRE-OP / PRE-PROCEDURE SCREENING ORDER (NO ISOLATION) - Swab, Nasopharynx [[id number]]  (Normal) Collected: 07/04/21 0302    Lab Status: Final result Specimen: Swab from Nasopharynx Updated: 07/04/21 0408    Narrative:      The following orders were created for panel order COVID PRE-OP / PRE-PROCEDURE SCREENING ORDER (NO ISOLATION) - Swab, Nasopharynx.  Procedure                               Abnormality         Status                     ---------                               -----------         ------                     COVID-19,CEPHEID,JORGE IN-...[[id number]]  Normal              Final result                 Please view results for these tests on the individual orders.    COVID-19,CEPHEID,JORGE IN-HOUSE(OR EMERGENT/ADD-ON),NP SWAB IN TRANSPORT MEDIA 3-4 HR TAT - Swab, Nasopharynx [[id number]]  (Normal) Collected: 07/04/21 0302    Lab Status: Final result Specimen: Swab from Nasopharynx Updated: 07/04/21 0408     COVID19 Not Detected    Narrative:      Fact sheet for providers: https://www.fda.gov/media/263558/download     Fact sheet for patients: https://www.fda.gov/media/142630/download  Fact sheet for providers: https://www.fda.gov/media/833686/download     Fact sheet for patients: https://www.fda.gov/media/187109/download            Reported Vancomycin Levels                         InsightRX AUC Calculation:    Current AUC: 0 mg/L*hr    Predicted Steady State AUC on Current Dose: 463 mg/L*hr (1250 mg " Q12H)  _________________________________    Predicted Steady State AUC on New Dose:  mg/L*hr    Assessment/Plan:   Vancomycin 1750 mg IV x 1 (given), then Vancomycin 1250 mg IV Q12H (14 mg/kg/dose)  Vancomycin trough level before 4th dose (12:00 Noon dose on Monday 7/5/21)  Pharmacy to follow    Good Ramirez [facility]  7/4/21 04:30

## 2021-07-04 NOTE — PLAN OF CARE
Goal Outcome Evaluation:           Progress: no change  Outcome Summary: VSS. Fluids started NS @100. PRN dilaudid, tylenol, and zofran given for pain and nausea. pt c/o lower abd pain. incision sites clean and dry. afebrile this shift. urine sample sent to lab. awaiting results. will cont to monitor.

## 2021-07-04 NOTE — H&P
[facility] Medicine Services  HISTORY AND PHYSICAL    Patient Name: Anisa Sosa  : 1990  MRN: [id number]  Primary Care Physician: Provider, No Known  Date of admission: 7/3/2021      Subjective   Subjective     Chief Complaint:  Lower abdominal pain.    HPI:  Anisa Sosa is a 31 y.o. female  to ED, who had laparoscopic hysterectomy done by Dr. Alvarenga (in [place]) on May 27.  Started having generalized fatigue over the past week, later on developed lower abdominal pain-constant, achy worse with any movement,, chills went to [place] ED approximately 4 days back on Wednesday-had CT abdomen done which showed inflammation as per patient, was prescribed Cipro, pain medication discharged home.  Also complains of minimal vaginal discharge for past week.  In spite of taking her medications had persistent lower abdominal pain along with chills, poor appetite, nausea without vomiting, dysuria-therefore presented to our ED.  Also constipated since last 5 days.  Tried taking stool softener without any bowel movement.    No co of orthopnea, PND, chest pain,cough, wheezing.  No co of  sore throat,   No co of bleeding per GI or .  Denies , diarrhea,   No new joint pain, or skin rash.  No focal weakness, speech changes or vision changes.      Current COVID Risks are:  [] Fever []  Cough [] Shortness of breath [] Fatigue [] Change in taste or smell    [] Exposure to COVID positive patient  [] High risk facility   []  NONE  COVID VACCINE status    The patient has a COVID HM Topic on their chart, and they are fully vaccinated.      Review of Systems   Complete ROS done, which is negative except as above and HPI.  Old records reviewed and summarized in PM hx      Personal History     History reviewed. No pertinent past medical history.        Past Surgical History:   Procedure Laterality Date   •  SECTION     • HYSTERECTOMY     • TUBAL ABDOMINAL LIGATION         Family  History:   Mother-had a PPM.    Social History:  reports that she has never smoked. She has never used smokeless tobacco. She reports current alcohol use. She reports that she does not use drugs.      Medications:  Available home medication information reviewed.  No medications prior to admission.       No Known Allergies    Objective   Objective     Vital Signs:   Temp:  [98.4 °F (36.9 °C)-100.5 °F (38.1 °C)] 100.5 °F (38.1 °C)  Heart Rate:  [] 97  Resp:  [18-20] 18  BP: (114-130)/(72-91) 114/72        Physical Exam     GENERAL- Not distressed, well nourished.  RS- CTA-BL, ,  No wheezing , no crackles, good effort.  CVS- s1s2 tachycardic, no murmur.  ABD-firm lower abdomen, tender to even superficial palpation, bowel sounds decreased  EXT- no edema.  NEURO- AAO-3, power 5/5 in all ext, no gross sensory deficit, cranial nerves intact.  EYES- Conjunctivae are normal. Pupils are equal, round, and reactive to light. No scleral icterus.   ENT- no external ear nose lesions, mucosa moist.  NECK- No JVD present. No tracheal deviation present. No thyromegaly present,No cervical lymphadenopathy.  JOINTS/MSK- no deformity, no swelling.  SKIN- no rash , warm to touch.  PSYCHIATRIC- Normal mood and affect. Behavior is normal. Thought content normal.     Results Reviewed:  I have personally reviewed current lab and radiology data.    Results from last 7 days   Lab Units 07/03/21  2230   WBC 10*3/mm3 14.40*   HEMOGLOBIN g/dL 12.2   HEMATOCRIT % 37.2   PLATELETS 10*3/mm3 175     Results from last 7 days   Lab Units 07/03/21  2230   SODIUM mmol/L 139   POTASSIUM mmol/L 3.5   CHLORIDE mmol/L 100   CO2 mmol/L 28.0   BUN mg/dL 6   CREATININE mg/dL 0.72   GLUCOSE mg/dL 99   CALCIUM mg/dL 9.6   ALT (SGPT) U/L 9   AST (SGOT) U/L 13   LACTATE mmol/L 0.8   PROCALCITONIN ng/mL 0.30*     Estimated Creatinine Clearance: 134.1 mL/min (by C-G formula based on SCr of 0.72 mg/dL).  Brief Urine Lab Results     None        Imaging Results  (Last 24 Hours)     Procedure Component Value Units Date/Time    CT Abdomen Pelvis With Contrast [[id number]] Collected: 07/03/21 2352     Updated: 07/03/21 2354    Narrative:      CT ABDOMEN AND PELVIS WITH CONTRAST, 7/3/2021    HISTORY:  31-year-old female one month postop hysterectomy presenting to the ED complaining of low abdomen pain and decreased bowel movements.    TECHNIQUE:  CT imaging of the abdomen and pelvis with IV contrast. Radiation dose reduction techniques included automated exposure control. Radiation audit for CT and nuclear cardiology exams in the last 12 months: 0.    PELVIS FINDINGS:  Postoperative changes of hysterectomy. Edema and soft tissue stranding is seen throughout the pelvic mesentery, and there are multiple small, scattered rim-enhancing interloop fluid collections within the low pelvis suspicious for multifocal abscess. The  largest collection in the low prerectal midline measures about 5.7 cm (image 73). There is soft tissue thickening of the vaginal cuff, but there is no abscess or air collection in this location. There is no air within the urinary bladder. Rectum is  unremarkable. 3.1 cm left ovary cyst.    ABDOMEN FINDINGS:  Small bowel and colon are normal in caliber with no evidence of bowel obstruction or significant adynamic ileus. No fluid collections are seen within the abdominal peritoneal or retroperitoneal cavities.    Both kidneys are normal in appearance with no evidence of upper urinary tract obstruction on the right or left. Normal renal parenchymal enhancement.    No gallbladder distention or bile duct dilatation. Liver, pancreas and spleen appear normal.    Lung base images show no active disease.      Impression:      1.  Postop hysterectomy with extensive peritoneal inflammation throughout the pelvis. There are multiple small peripherally enhancing interloop fluid collections scattered within the low pelvis concerning for interloop abscesses. Most of these  measure  only 1 to 2 cm. Largest low midline prerectal collection measures up to 5.7 cm.  2.  Soft tissue thickening of the vaginal cuff but no obvious evidence of cuff dehiscence. There is no abscess or free air at the cuff. No air within the bladder.  3.  No evidence of upper urinary tract obstruction.    Signer Name: William Cox MD   Signed: 7/3/2021 11:52 PM   Workstation Name: [email or web address]    [facility]          WBC-14, pro-Armando of 0.3,        Assessment/Plan   Active Hospital Problems    Diagnosis  POA   • Intra-abdominal abscess post-procedure [T81.43XA]  Yes       Assessment & Plan   Lower abdominal pain-with history of recent hysterectomy-CT abdomen pelvis with contrast-shows peritoneal inflammation with interloop abscesses-the biggest one 5 cm in perirectal area.  -Likely postop abscess, septic, started on Merrem, Zosyn will continue, IV opiates for pain management.  Case discussed with Dr. Robertson-OB/GYN on-call, formal consult in a.m.,  -To be discussed with IR in a.m. if any drainage possible.  -We will order blood culture.  -Send UA.      DVT prophylaxis:    -TEDs/SCDs  -Lovenox-none in case intervention is needed.    CODE STATUS:    Code Status and Medical Interventions:   Ordered at: 07/04/21 0418     Level Of Support Discussed With:    Patient     Code Status:    CPR     Medical Interventions (Level of Support Prior to Arrest):    Full         Admission Status:  I believe this patient meets inpatient criteria secondary to intra-abdominal abscess.

## 2021-07-05 ENCOUNTER — APPOINTMENT (OUTPATIENT)
Dept: CT IMAGING | Facility: HOSPITAL | Age: 31
End: 2021-07-05

## 2021-07-05 LAB
BASOPHILS # BLD MANUAL: 0 10*3/MM3 (ref 0–0.2)
BASOPHILS NFR BLD AUTO: 0 % (ref 0–1.5)
CK SERPL-CCNC: 32 U/L (ref 20–180)
CRP SERPL-MCNC: 31.69 MG/DL (ref 0–0.5)
DEPRECATED RDW RBC AUTO: 43.5 FL (ref 37–54)
EOSINOPHIL # BLD MANUAL: 0.1 10*3/MM3 (ref 0–0.4)
EOSINOPHIL NFR BLD MANUAL: 1 % (ref 0.3–6.2)
ERYTHROCYTE [DISTWIDTH] IN BLOOD BY AUTOMATED COUNT: 12.7 % (ref 12.3–15.4)
HCT VFR BLD AUTO: 33.5 % (ref 34–46.6)
HGB BLD-MCNC: 11 G/DL (ref 12–15.9)
LYMPHOCYTES # BLD MANUAL: 0.91 10*3/MM3 (ref 0.7–3.1)
LYMPHOCYTES NFR BLD MANUAL: 3 % (ref 5–12)
LYMPHOCYTES NFR BLD MANUAL: 9 % (ref 19.6–45.3)
MCH RBC QN AUTO: 30.6 PG (ref 26.6–33)
MCHC RBC AUTO-ENTMCNC: 32.8 G/DL (ref 31.5–35.7)
MCV RBC AUTO: 93.1 FL (ref 79–97)
METAMYELOCYTES NFR BLD MANUAL: 1 % (ref 0–0)
MONOCYTES # BLD AUTO: 0.3 10*3/MM3 (ref 0.1–0.9)
MRSA DNA SPEC QL NAA+PROBE: NEGATIVE
NEUTROPHILS # BLD AUTO: 8.67 10*3/MM3 (ref 1.7–7)
NEUTROPHILS NFR BLD MANUAL: 72 % (ref 42.7–76)
NEUTS BAND NFR BLD MANUAL: 14 % (ref 0–5)
NRBC SPEC MANUAL: 0 /100 WBC (ref 0–0.2)
PLAT MORPH BLD: NORMAL
PLATELET # BLD AUTO: 170 10*3/MM3 (ref 140–450)
PMV BLD AUTO: 10.7 FL (ref 6–12)
PROCALCITONIN SERPL-MCNC: 0.21 NG/ML (ref 0–0.25)
RBC # BLD AUTO: 3.6 10*6/MM3 (ref 3.77–5.28)
RBC MORPH BLD: NORMAL
VANCOMYCIN TROUGH SERPL-MCNC: <4 MCG/ML (ref 5–20)
WBC # BLD AUTO: 10.08 10*3/MM3 (ref 3.4–10.8)
WBC MORPH BLD: NORMAL

## 2021-07-05 PROCEDURE — 85007 BL SMEAR W/DIFF WBC COUNT: CPT | Performed by: INTERNAL MEDICINE

## 2021-07-05 PROCEDURE — 99232 SBSQ HOSP IP/OBS MODERATE 35: CPT | Performed by: INTERNAL MEDICINE

## 2021-07-05 PROCEDURE — 25010000002 PIPERACILLIN SOD-TAZOBACTAM PER 1 G: Performed by: INTERNAL MEDICINE

## 2021-07-05 PROCEDURE — 86140 C-REACTIVE PROTEIN: CPT | Performed by: INTERNAL MEDICINE

## 2021-07-05 PROCEDURE — 25010000002 ONDANSETRON PER 1 MG: Performed by: INTERNAL MEDICINE

## 2021-07-05 PROCEDURE — 49406 IMAGE CATH FLUID PERI/RETRO: CPT

## 2021-07-05 PROCEDURE — 87641 MR-STAPH DNA AMP PROBE: CPT | Performed by: PHYSICIAN ASSISTANT

## 2021-07-05 PROCEDURE — 25010000002 DAPTOMYCIN PER 1 MG: Performed by: PHYSICIAN ASSISTANT

## 2021-07-05 PROCEDURE — 77012 CT SCAN FOR NEEDLE BIOPSY: CPT

## 2021-07-05 PROCEDURE — 25010000003 LIDOCAINE 1 % SOLUTION: Performed by: RADIOLOGY

## 2021-07-05 PROCEDURE — 25010000002 VANCOMYCIN PER 500 MG

## 2021-07-05 PROCEDURE — 85025 COMPLETE CBC W/AUTO DIFF WBC: CPT | Performed by: INTERNAL MEDICINE

## 2021-07-05 PROCEDURE — 25010000002 VANCOMYCIN 10 G RECONSTITUTED SOLUTION

## 2021-07-05 PROCEDURE — 25010000002 HYDROMORPHONE PER 4 MG: Performed by: INTERNAL MEDICINE

## 2021-07-05 PROCEDURE — 80202 ASSAY OF VANCOMYCIN: CPT

## 2021-07-05 PROCEDURE — 82550 ASSAY OF CK (CPK): CPT | Performed by: PHYSICIAN ASSISTANT

## 2021-07-05 PROCEDURE — 84145 PROCALCITONIN (PCT): CPT | Performed by: INTERNAL MEDICINE

## 2021-07-05 PROCEDURE — 99232 SBSQ HOSP IP/OBS MODERATE 35: CPT | Performed by: OBSTETRICS & GYNECOLOGY

## 2021-07-05 RX ORDER — LIDOCAINE HYDROCHLORIDE 10 MG/ML
20 INJECTION, SOLUTION INFILTRATION; PERINEURAL ONCE
Status: COMPLETED | OUTPATIENT
Start: 2021-07-05 | End: 2021-07-05

## 2021-07-05 RX ORDER — POLYETHYLENE GLYCOL 3350 17 G/17G
17 POWDER, FOR SOLUTION ORAL DAILY
Status: DISCONTINUED | OUTPATIENT
Start: 2021-07-05 | End: 2021-07-09

## 2021-07-05 RX ORDER — VANCOMYCIN HYDROCHLORIDE 1 G/200ML
1000 INJECTION, SOLUTION INTRAVENOUS EVERY 8 HOURS SCHEDULED
Status: DISCONTINUED | OUTPATIENT
Start: 2021-07-05 | End: 2021-07-05

## 2021-07-05 RX ADMIN — VANCOMYCIN HYDROCHLORIDE 1250 MG: 100 INJECTION, POWDER, LYOPHILIZED, FOR SOLUTION INTRAVENOUS at 00:41

## 2021-07-05 RX ADMIN — TAZOBACTAM SODIUM AND PIPERACILLIN SODIUM 4.5 G: 500; 4 INJECTION, SOLUTION INTRAVENOUS at 12:21

## 2021-07-05 RX ADMIN — ONDANSETRON 4 MG: 2 INJECTION INTRAMUSCULAR; INTRAVENOUS at 19:54

## 2021-07-05 RX ADMIN — HYDROMORPHONE HYDROCHLORIDE 0.5 MG: 1 INJECTION, SOLUTION INTRAMUSCULAR; INTRAVENOUS; SUBCUTANEOUS at 04:47

## 2021-07-05 RX ADMIN — ONDANSETRON 4 MG: 2 INJECTION INTRAMUSCULAR; INTRAVENOUS at 10:30

## 2021-07-05 RX ADMIN — LIDOCAINE HYDROCHLORIDE 20 ML: 10 INJECTION, SOLUTION INFILTRATION; PERINEURAL at 11:47

## 2021-07-05 RX ADMIN — SODIUM CHLORIDE, PRESERVATIVE FREE 10 ML: 5 INJECTION INTRAVENOUS at 09:02

## 2021-07-05 RX ADMIN — HYDROMORPHONE HYDROCHLORIDE 0.5 MG: 1 INJECTION, SOLUTION INTRAMUSCULAR; INTRAVENOUS; SUBCUTANEOUS at 07:01

## 2021-07-05 RX ADMIN — HYDROMORPHONE HYDROCHLORIDE 0.5 MG: 1 INJECTION, SOLUTION INTRAMUSCULAR; INTRAVENOUS; SUBCUTANEOUS at 02:41

## 2021-07-05 RX ADMIN — HYDROMORPHONE HYDROCHLORIDE 0.5 MG: 1 INJECTION, SOLUTION INTRAMUSCULAR; INTRAVENOUS; SUBCUTANEOUS at 18:49

## 2021-07-05 RX ADMIN — HYDROMORPHONE HYDROCHLORIDE 0.5 MG: 1 INJECTION, SOLUTION INTRAMUSCULAR; INTRAVENOUS; SUBCUTANEOUS at 13:49

## 2021-07-05 RX ADMIN — ACETAMINOPHEN 650 MG: 325 TABLET, FILM COATED ORAL at 16:56

## 2021-07-05 RX ADMIN — TAZOBACTAM SODIUM AND PIPERACILLIN SODIUM 4.5 G: 500; 4 INJECTION, SOLUTION INTRAVENOUS at 19:54

## 2021-07-05 RX ADMIN — HYDROMORPHONE HYDROCHLORIDE 0.5 MG: 1 INJECTION, SOLUTION INTRAMUSCULAR; INTRAVENOUS; SUBCUTANEOUS at 21:51

## 2021-07-05 RX ADMIN — VANCOMYCIN HYDROCHLORIDE 1000 MG: 1 INJECTION, SOLUTION INTRAVENOUS at 13:49

## 2021-07-05 RX ADMIN — HYDROMORPHONE HYDROCHLORIDE 0.5 MG: 1 INJECTION, SOLUTION INTRAMUSCULAR; INTRAVENOUS; SUBCUTANEOUS at 09:01

## 2021-07-05 RX ADMIN — DAPTOMYCIN 450 MG: 500 INJECTION, POWDER, LYOPHILIZED, FOR SOLUTION INTRAVENOUS at 18:44

## 2021-07-05 RX ADMIN — HYDROMORPHONE HYDROCHLORIDE 0.5 MG: 1 INJECTION, SOLUTION INTRAMUSCULAR; INTRAVENOUS; SUBCUTANEOUS at 00:41

## 2021-07-05 RX ADMIN — ACETAMINOPHEN 650 MG: 325 TABLET, FILM COATED ORAL at 08:16

## 2021-07-05 RX ADMIN — TAZOBACTAM SODIUM AND PIPERACILLIN SODIUM 4.5 G: 500; 4 INJECTION, SOLUTION INTRAVENOUS at 03:52

## 2021-07-05 RX ADMIN — HYDROMORPHONE HYDROCHLORIDE 0.5 MG: 1 INJECTION, SOLUTION INTRAMUSCULAR; INTRAVENOUS; SUBCUTANEOUS at 16:56

## 2021-07-05 RX ADMIN — POLYETHYLENE GLYCOL 3350 17 G: 17 POWDER, FOR SOLUTION ORAL at 16:56

## 2021-07-05 RX ADMIN — SODIUM CHLORIDE 100 ML/HR: 9 INJECTION, SOLUTION INTRAVENOUS at 12:21

## 2021-07-05 RX ADMIN — ACETAMINOPHEN 650 MG: 325 TABLET, FILM COATED ORAL at 03:52

## 2021-07-05 RX ADMIN — HYDROMORPHONE HYDROCHLORIDE 0.5 MG: 1 INJECTION, SOLUTION INTRAMUSCULAR; INTRAVENOUS; SUBCUTANEOUS at 10:50

## 2021-07-05 RX ADMIN — HYDROMORPHONE HYDROCHLORIDE 0.5 MG: 1 INJECTION, SOLUTION INTRAMUSCULAR; INTRAVENOUS; SUBCUTANEOUS at 23:54

## 2021-07-05 NOTE — CASE MANAGEMENT/SOCIAL WORK
Discharge Planning Assessment  [facility]     Patient Name: Anisa Sosa  MRN: [id number]  Today's Date: 7/5/2021    Admit Date: 7/3/2021    Discharge Needs Assessment     Row Name 07/05/21 1543       Living Environment    Lives With  alone    Current Living Arrangements  home/apartment/condo    Primary Care Provided by  self    Provides Primary Care For  no one    Quality of Family Relationships  involved;helpful    Able to Return to Prior Arrangements  yes       Resource/Environmental Concerns    Resource/Environmental Concerns  none    Transportation Concerns  car, none       Transition Planning    Patient/Family Anticipates Transition to  home    Patient/Family Anticipated Services at Transition  none    Transportation Anticipated  family or friend will provide       Discharge Needs Assessment    Readmission Within the Last 30 Days  no previous admission in last 30 days    Equipment Currently Used at Home  none    Concerns to be Addressed  discharge planning    Anticipated Changes Related to Illness  none    Equipment Needed After Discharge  none        Discharge Plan     Row Name 07/05/21 1544       Plan    Plan  Initial CM eval    Patient/Family in Agreement with Plan  yes    Plan Comments  Confirmed with patient she is independent of ADLs and lvies in [facility]. She denies the use of any DME at home. ID consult has been made - CM will continue to follow for possible antibiotic needs at discharge. She will also need a new patient PCP appointment to establish care and CM will help arrange closer to day of DC - Following- diego [phone]        Continued Care and Services - Admitted Since 7/3/2021    Coordination has not been started for this encounter.         Demographic Summary     Row Name 07/05/21 1543       General Information    Admission Type  inpatient    Arrived From  home    Referral Source  admission list    Reason for Consult  discharge planning    Preferred Language  English        Contact Information    Permission Granted to Share Info With          Functional Status     Row Name 07/05/21 1543       Functional Status    Usual Activity Tolerance  good    Current Activity Tolerance  good       Functional Status, IADL    Medications  independent    Meal Preparation  independent    Housekeeping  independent    Laundry  independent    Shopping  independent        Psychosocial    No documentation.       Abuse/Neglect    No documentation.       Legal    No documentation.       Substance Abuse    No documentation.       Patient Forms    No documentation.           Yovana Farrell RN

## 2021-07-05 NOTE — PROGRESS NOTES
BERNABE Araya  Anisa Sosa  : 1990  MRN: [id number]  CSN: [id number]    Hospital Day # 2   POD#39 s/p LAVH/BS at outside hospital   CC: hospital follow up of post op pelvic abscesses  Subjective   She is getting ready to go down for CT guided drainage of abscess. Reports pain is a little better and has less rectal pressure with voiding. Reports episode of emesis this morning after eating breakfast. Currently not nauseated. Reports overall feeling a little better but still has lower abdominal pain that moves to the left some. She had two documented fevers overnight around 102F.     Objective     Min/max vitals past 24 hours:   Temp  Min: 98.7 °F (37.1 °C)  Max: 102.5 °F (39.2 °C)  BP  Min: 105/69  Max: 118/75  Pulse  Min: 86  Max: 99  Resp  Min: 18  Max: 18        I/O last 3 completed shifts:  In: 3628 [P.O.:1930; I.V.:198; IV Piggyback:1500]  Out: 1750 [Urine:1750]    General: well developed; well nourished  no acute distress   Abdomen: Soft,  Mildly TTP in lower quadrants, no rebound or guarding   no umbilical or inguinal hernias are present  no hepato-splenomegaly  incision is clean, dry, intact and without drainage  Bowel sounds present throughout    Pelvic: Not performed   Ext: Calves NT     CBC w/ diff:   Lab Results   Component Value Date     2021    HGB 11.0 (L) 2021    HCT 33.5 (L) 2021    MCV 93.1 2021    RDW 12.7 2021    WBC 10.08 2021    NEUTRORELPCT 89.7 (H) 2021    AUTOIGPER 0.6 (H) 2021    LYMPHORELPCT 3.3 (L) 2021    MONORELPCT 4.9 (L) 2021    EOSRELPCT 1.3 2021    BASORELPCT 0.2 2021     CMP:   Lab Results   Component Value Date     2021    K 3.5 2021     2021    CO2 28.0 2021    BUN 6 2021    CREATININE 0.72 2021    GLUCOSE 99 2021    ALBUMIN 3.60 2021    CALCIUM 9.6 2021    AST 13 2021    ALT 9 2021    BILITOT 1.0 2021      Lactate:   Lab Results   Component Value Date    LACTATE 0.8 07/03/2021     UA:    Lab Results   Component Value Date    SQUAMEPIUA 3-6 (A) 07/04/2021    SPECGRAVUR 1.026 07/04/2021    KETONESU Negative 07/04/2021    BLOODU Negative 07/04/2021    LEUKOCYTESUR Negative 07/04/2021    NITRITEU Negative 07/04/2021    RBCUA 3-6 (A) 07/04/2021    WBCUA 3-5 (A) 07/04/2021    BACTERIA None Seen 07/04/2021     Urine Culture: No results found for: URINECX    CT AP from 7/3/21 IMPRESSION:  1.  Postop hysterectomy with extensive peritoneal inflammation throughout the pelvis. There are multiple small peripherally enhancing interloop fluid collections scattered within the low pelvis concerning for interloop abscesses. Most of these measure  only 1 to 2 cm. Largest low midline prerectal collection measures up to 5.7 cm.  2.  Soft tissue thickening of the vaginal cuff but no obvious evidence of cuff dehiscence. There is no abscess or free air at the cuff. No air within the bladder.  3.  No evidence of upper urinary tract obstruction.     Assessment   1. POD#39 s/p LAVH/BS for AUB-L, pelvic pain per patient.   2. Post operative pelvic abscesses   3. Post operative ileus      Plan   1. Agree with IR drainage of large abscess  2. Continue IV vanc/zosyn and labs daily   3. Agree with consulting ID  4. Slowly advance diet   5. Plan of care discussed with patient and her mother over the phone.     Amee Robertson MD  7/5/2021  11:18 EDT

## 2021-07-05 NOTE — PLAN OF CARE
Goal Outcome Evaluation:           Progress: no change  Outcome Summary: VSS. afebrile this shift. dilaudid given q2 for pain control. pt went to have CT guided aspiration of the abcess that was unsuccessful. possibly trying again here in the next couple days. good uop. lap sites are clean dry and intact. room air, A&Ox4. will cont to monitor.

## 2021-07-05 NOTE — PLAN OF CARE
Goal Outcome Evaluation:           Progress: no change  Outcome Summary: Patient febrile, 102.3.  Tylenol given x 2.  Dilaudid given Q2 for pain.  Patient up ad geo to bathroom.  Possible aspiration of fluid collection in the am.  ID consulted.  NSR, room air.  Will continue to monitor.

## 2021-07-05 NOTE — POST-PROCEDURE NOTE
Interventional Radiology Operative Note    Date: 07/05/21     Time: 12:24 EDT     Pre-op Diagnosis: Perirectal collection.  Post-op Diagnosis: same    Procedure: CT guided drain placement, aborted.    Surgeon: Catalina Bowman M.D.  Assistants: None    Sedation: None    Estimated Blood Loss (EBL): Trace     Urine Output (UOP): N/A (short procedure)    IVF: N/A (short procedure)    Findings: Collection is mobile, drain placement was aborted.    Specimens: None.    Complications: None.    Disposition: Patient to floor. Continue IV antibiotics. Can repeat CT of the abdomen and pelvis with IV contrast in 3 days if patient does not improve. Existing collection is not yet loculated so drain placement was not possible. If collection becomes loculated on repeat imaging, we can reassess and attempt.

## 2021-07-05 NOTE — CONSULTS
INFECTIOUS DISEASE CONSULT/INITIAL HOSPITAL VISIT    Anisa Sosa  1990  [id number]    Date of Consult: 7/5/2021    Admission Date: 7/3/2021      Requesting Provider: Walter Prieto MD  Evaluating Physician: Titus Frias MD    Reason for Consultation: intraabdominal abscesses after hysterectomy    History of present illness:    Patient is a 31 y.o. female with h/o STIs and abnormal uterine bleeding/uterine fibroids s/p lap vaginal-assisted hysterectomy in [place] on 5/27/21 (with ovaries retained) who followed up a week later and was placed on 5 days of Cipro for possible UTI. She did relatively well post-operatively with mild tenderness.  Last Sunday, she went to a tournament in [place] to watch her daughter and started feeling fatigued.  She slept for a couple of days, but on Tuesday, she began having severe abdominal pain in lower part of abdomen.  She has some dizziness and lightheadedness with the pain.  She went to [facility] ED on 6/30 with a CT scan at that time showing some inflammation of the pelvic area.  She was sent home on ibuprofen, pain meds, and Cipro.  She continued to have pain and pelvic pressure with no relief from medications or heating pad.  She had some difficulty breathing because of the bloating and pressure.  She had not had a BM since 6/29. She started having nausea and vomiting on 7/1.  On 7/2, she had worsening pain and came to [facility] ED on 7/3.  A CT scan here showed interloop abscesses 1 to 2 cm in sizes and a 5.7 cm abscess near the rectum.  A CT-guided aspiration of prerectal abscess was attempted today but aborted d/t the fluid collection being mobile.  Her Tmax since arrival has been 102.5.  Pertinent labs are PCT 0.3, lactic acid 0.8, WBC 14,400 with 90% neutrophils, creatinine 0.72, CRP 31.7, and UA WBC 3-5. Blood cultures are negative to date. A COVID-19 PCR was negative. She is currently on Vancomycin and Zosyn.  ID was asked to evaluate and  manage her antibiotic therapy.     Past Medical History:   Diagnosis Date   • Anemia    • History of chlamydia    • History of trichomonal vaginitis        Past Surgical History:   Procedure Laterality Date   •  SECTION     •  SECTION     •  SECTION WITH TUBAL  2013   • LAPAROSCOPIC ASSISTED VAGINAL HYSTERECTOMY  2021    for AUB-L, pelvic pain. Ovaries retained per patient. Dr. Avani Horta.        Family History   Problem Relation Age of Onset   • Breast cancer Maternal Grandmother    • Ovarian cancer Neg Hx    • Colon cancer Neg Hx        Social History     Socioeconomic History   • Marital status: Single     Spouse name: Not on file   • Number of children: Not on file   • Years of education: Not on file   • Highest education level: Not on file   Tobacco Use   • Smoking status: Former Smoker   • Smokeless tobacco: Never Used   • Tobacco comment: off and on for 3 years    Substance and Sexual Activity   • Alcohol use: Yes     Comment: occ   • Drug use: Never       No Known Allergies      Medication:    Current Facility-Administered Medications:   •  ! Vancomycin trough level before 12:00 Noon dose on 21; hold dose until level checked by a pharmacist, , Does not apply, Once, [facility], located within [facility]  •  [START ON 2021] ! Vancomycin trough level prior to 0600 dose  . Please do not administer dose until level is evaluated for potential dose adjustments, , Does not apply, Once, [facility]  •  acetaminophen (TYLENOL) tablet 650 mg, 650 mg, Oral, Q4H PRN, 650 mg at 21 0816 **OR** acetaminophen (TYLENOL) 160 MG/5ML solution 650 mg, 650 mg, Oral, Q4H PRN **OR** acetaminophen (TYLENOL) suppository 650 mg, 650 mg, Rectal, Q4H PRN, Tierney Martinez MD  •  HYDROmorphone (DILAUDID) injection 0.5 mg, 0.5 mg, Intravenous, Q2H PRN, Tierney Martinez MD, 0.5 mg at 21 1349  •  ondansetron (ZOFRAN) tablet 4 mg, 4 mg, Oral, Q6H PRN **OR** ondansetron  (ZOFRAN) injection 4 mg, 4 mg, Intravenous, Q6H PRN, Tierney Martinez MD, 4 mg at 07/05/21 1030  •  Pharmacy to dose vancomycin, , Does not apply, Continuous PRN, Tierney Martinez MD  •  phenol (CHLORASEPTIC) 1.4 % liquid 2 spray, 2 spray, Mouth/Throat, Q2H PRN, Clovis, Corin, APRN  •  piperacillin-tazobactam (ZOSYN) 4.5 g in iso-osmotic dextrose 100 mL IVPB (premix), 4.5 g, Intravenous, Q8H, Tierney Martinez MD, 4.5 g at 07/05/21 1221  •  Sodium Chloride (PF) 0.9 % 10 mL, 10 mL, Intravenous, PRN, Tierney Martinez MD  •  sodium chloride 0.9 % flush 1-10 mL, 1-10 mL, Intravenous, PRN, Tierney Martinez MD, 10 mL at 07/04/21 1638  •  sodium chloride 0.9 % flush 10 mL, 10 mL, Intravenous, Q12H, Tierney Martinez MD, 10 mL at 07/05/21 0902  •  sodium chloride 0.9 % infusion, 100 mL/hr, Intravenous, Continuous, Walter Prieto MD, Last Rate: 100 mL/hr at 07/05/21 1221, 100 mL/hr at 07/05/21 1221  •  vancomycin (VANCOCIN) in iso-osmotic dextrose IVPB 1 g (premix) 200 mL, 1,000 mg, Intravenous, Q8H, [facility] RPH, 1,000 mg at 07/05/21 1349    Antibiotics:  Anti-Infectives (From admission, onward)    Ordered     Dose/Rate Route Frequency Start Stop    07/05/21 1329  vancomycin (VANCOCIN) in iso-osmotic dextrose IVPB 1 g (premix) 200 mL     Ordering Provider: [facility] RPH    1,000 mg  over 90 Minutes Intravenous Every 8 Hours Scheduled 07/05/21 1415 07/11/21 1359    07/04/21 0423  piperacillin-tazobactam (ZOSYN) 4.5 g in iso-osmotic dextrose 100 mL IVPB (premix)     Ordering Provider: Tierney Martinez MD    4.5 g  over 4 Hours Intravenous Every 8 Hours 07/04/21 1200 07/09/21 1159    07/04/21 0423  piperacillin-tazobactam (ZOSYN) 4.5 g in iso-osmotic dextrose 100 mL IVPB (premix)     Ordering Provider: Tierney Martinez MD    4.5 g  over 30 Minutes Intravenous Once 07/04/21 0600 07/04/21 0528    07/04/21 0423  Pharmacy to dose vancomycin     Ordering Provider: Tierney Martinez MD     Does not apply  Continuous PRN 21 0421 21 0420    21 2355  vancomycin 1750 mg/500 mL 0.9% NS IVPB ([facility])     Ordering Provider: Lito Machuca DO    20 mg/kg × 83.9 kg  over 120 Minutes Intravenous Once 21 2357 21 0250    21 2355  piperacillin-tazobactam (ZOSYN) 3.375 g in iso-osmotic dextrose 50 ml (premix)     Ordering Provider: Lito Machuca DO    3.375 g  over 30 Minutes Intravenous Once 21 23521 0140            Review of Systems:  Constitutional-- + Fever, chills, sweats.  Appetite poor, and no malaise. + fatigue.  HEENT-- No new vision, hearing or throat complaints.  No epistaxis or oral sores.  Denies odynophagia or dysphagia. No headache, photophobia or neck stiffness.  CV-- No chest pain, palpitation or syncope  Resp-- + SOB/no cough/Hemoptysis  GI- + nausea, + vomiting, no diarrhea.  No hematochezia, melena, or hematemesis. Denies jaundice or chronic liver disease.  -- No dysuria, hematuria, or flank pain.  Denies hesitancy, urgency or burning with urination.  Just pressure.  Lymph- no swollen lymph nodes in neck/axilla or groin.   Heme- No active bruising or bleeding; no Hx of DVT or PE.  MS-- no swelling or pain in the bones or joints of arms/legs.  No new back pain.  Neuro-- No acute focal weakness or numbness in the arms or legs.  No seizures.  Skin--No rashes or lesions      Physical Exam:   Vital Signs  Temp (24hrs), Av.8 °F (38.2 °C), Min:98.7 °F (37.1 °C), Max:102.5 °F (39.2 °C)    Temp  Min: 98.7 °F (37.1 °C)  Max: 102.5 °F (39.2 °C)  BP  Min: 105/69  Max: 118/75  Pulse  Min: 86  Max: 99  Resp  Min: 18  Max: 18  SpO2  Min: 93 %  Max: 96 %    GENERAL: Awake and alert, in no acute distress.   HEENT: Normocephalic, atraumatic.  PERRL. EOMI. No conjunctival injection. No icterus. Oropharynx clear without evidence of thrush or exudate..    NECK: Supple without nuchal rigidity. No mass.  HEART: RRR; No murmur, rubs, gallops.   LUNGS: Clear to  auscultation bilaterally without wheezing, rales, rhonchi. Normal respiratory effort. Nonlabored.  ABDOMEN: Soft, lower abdominal tenderness, mildly distended. Laparoscopic incision sites are healing well. Quiet bowel sounds. No rebound or guarding. NO mass or HSM.  EXT:  No cyanosis, clubbing or edema. No cord.  :  Without Menon catheter.   MSK:  FROM, no joint effusions  SKIN: Warm and dry without cutaneous eruptions on Inspection/palpation.    NEURO: Oriented to PPT. Normal speech and cognition.  PSYCHIATRIC: Normal insight and judgment. Cooperative with PE    Laboratory Data    Results from last 7 days   Lab Units 07/05/21  0756 07/03/21  2230   WBC 10*3/mm3 10.08 14.40*   HEMOGLOBIN g/dL 11.0* 12.2   HEMATOCRIT % 33.5* 37.2   PLATELETS 10*3/mm3 170 175     Results from last 7 days   Lab Units 07/03/21  2230   SODIUM mmol/L 139   POTASSIUM mmol/L 3.5   CHLORIDE mmol/L 100   CO2 mmol/L 28.0   BUN mg/dL 6   CREATININE mg/dL 0.72   GLUCOSE mg/dL 99   CALCIUM mg/dL 9.6     Results from last 7 days   Lab Units 07/03/21  2230   ALK PHOS U/L 77   BILIRUBIN mg/dL 1.0   ALT (SGPT) U/L 9   AST (SGOT) U/L 13         Results from last 7 days   Lab Units 07/05/21  0756   CRP mg/dL 31.69*     Results from last 7 days   Lab Units 07/03/21  2230   LACTATE mmol/L 0.8         Results from last 7 days   Lab Units 07/05/21  1230   VANCOMYCIN TR mcg/mL <4.00*     Estimated Creatinine Clearance: 133.9 mL/min (by C-G formula based on SCr of 0.72 mg/dL).      Microbiology:  Microbiology Results (last 10 days)     Procedure Component Value - Date/Time    COVID PRE-OP / PRE-PROCEDURE SCREENING ORDER (NO ISOLATION) - Swab, Nasopharynx [[id number]]  (Normal) Collected: 07/04/21 0302    Lab Status: Final result Specimen: Swab from Nasopharynx Updated: 07/04/21 0408    Narrative:      The following orders were created for panel order COVID PRE-OP / PRE-PROCEDURE SCREENING ORDER (NO ISOLATION) - Swab, Nasopharynx.  Procedure                                Abnormality         Status                     ---------                               -----------         ------                     COVID-19,CEPHEID,JORGE IN-...[[id number]]  Normal              Final result                 Please view results for these tests on the individual orders.    COVID-19,CEPHEID,JORGE IN-HOUSE(OR EMERGENT/ADD-ON),NP SWAB IN TRANSPORT MEDIA 3-4 HR TAT - Swab, Nasopharynx [[id number]]  (Normal) Collected: 07/04/21 0302    Lab Status: Final result Specimen: Swab from Nasopharynx Updated: 07/04/21 0408     COVID19 Not Detected    Narrative:      Fact sheet for providers: https://www.fda.gov/media/108075/download     Fact sheet for patients: https://www.fda.gov/media/910810/download  Fact sheet for providers: https://www.fda.gov/media/834013/download     Fact sheet for patients: https://www.fda.gov/media/084230/download    Blood Culture - Blood, Arm, Left [[id number]] Collected: 07/04/21 0017    Lab Status: Preliminary result Specimen: Blood from Arm, Left Updated: 07/05/21 0445     Blood Culture No growth at 24 hours    Blood Culture - Blood, Arm, Right [[id number]] Collected: 07/04/21 0017    Lab Status: Preliminary result Specimen: Blood from Arm, Right Updated: 07/05/21 0445     Blood Culture No growth at 24 hours              Radiology:  Imaging Results (Last 72 Hours)     Procedure Component Value Units Date/Time    CT Guided Biopsy Aspiration Injection [[id number]] Collected: 07/05/21 1220     Updated: 07/05/21 1230    Narrative:      EXAMINATION: CT GUIDED ASPIRATION-perirectal collection.     INDICATION: History of hysterectomy approximately one month prior to  presentation with recent fevers and abdominal pain found to have  multifocal fluid collections within the lower abdomen and pelvis  suspicious for abscesses. Presents to interventional radiology for  CT-guided transgluteal perirectal drain placement.     TECHNIQUE: After informed written consent, the patient was placed  prone  on the CT table.  CT revealed an approximately 5.5 x 2.8 cm  collection in the pouch of Drew. This was amenable to drainage. The  patient was prepped and draped in the normal sterile fashion. 1%  lidocaine was infiltrated for local anesthesia. Under CT fluoroscopy  scopic guidance, an 18-gauge Chiba needle was advanced into the  collection. Discussion was found to be quite mobile and the needle was  unable to be advanced into the lesion proper. After 2 attempts, I  decided to abort the procedure. The patient tolerated the procedure  well.     The radiation dose reduction device was turned on for each scan per the  ALARA (As Low as Reasonably Achievable) protocol.     COMPARISON: CT abdomen and pelvis from 7/3/2021     FINDINGS: Mobile perirectal collection possibly representing an early  abscess. Unable to be punctured due to mobility. Procedure was aborted.          Impression:      Mobile perirectal collection similar to that seen on prior  imaging. Due to mobility, procedure was aborted. Recommend follow-up  imaging in 3 days to evaluate for possible reattempt.        This report was finalized on 7/5/2021 12:27 PM by Catalina Bowman.       CT Abdomen Pelvis With Contrast [[id number]] Collected: 07/03/21 2352     Updated: 07/03/21 2354    Narrative:      CT ABDOMEN AND PELVIS WITH CONTRAST, 7/3/2021    HISTORY:  31-year-old female one month postop hysterectomy presenting to the ED complaining of low abdomen pain and decreased bowel movements.    TECHNIQUE:  CT imaging of the abdomen and pelvis with IV contrast. Radiation dose reduction techniques included automated exposure control. Radiation audit for CT and nuclear cardiology exams in the last 12 months: 0.    PELVIS FINDINGS:  Postoperative changes of hysterectomy. Edema and soft tissue stranding is seen throughout the pelvic mesentery, and there are multiple small, scattered rim-enhancing interloop fluid collections within the low pelvis suspicious  for multifocal abscess. The  largest collection in the low prerectal midline measures about 5.7 cm (image 73). There is soft tissue thickening of the vaginal cuff, but there is no abscess or air collection in this location. There is no air within the urinary bladder. Rectum is  unremarkable. 3.1 cm left ovary cyst.    ABDOMEN FINDINGS:  Small bowel and colon are normal in caliber with no evidence of bowel obstruction or significant adynamic ileus. No fluid collections are seen within the abdominal peritoneal or retroperitoneal cavities.    Both kidneys are normal in appearance with no evidence of upper urinary tract obstruction on the right or left. Normal renal parenchymal enhancement.    No gallbladder distention or bile duct dilatation. Liver, pancreas and spleen appear normal.    Lung base images show no active disease.      Impression:      1.  Postop hysterectomy with extensive peritoneal inflammation throughout the pelvis. There are multiple small peripherally enhancing interloop fluid collections scattered within the low pelvis concerning for interloop abscesses. Most of these measure  only 1 to 2 cm. Largest low midline prerectal collection measures up to 5.7 cm.  2.  Soft tissue thickening of the vaginal cuff but no obvious evidence of cuff dehiscence. There is no abscess or free air at the cuff. No air within the bladder.  3.  No evidence of upper urinary tract obstruction.    Signer Name: William Cox MD   Signed: 7/3/2021 11:52 PM   Workstation Name: [id number]    [facility] (Dr Frias) read her CT A/P and agree with the above report    Impression:   - Sepsis with fever, tachycardia, leukocytosis, and post-operative pelvic abscesses  - Multiple pelvic abscess after prior hysterectomy 5/27/21.  CT-guided aspiration of 5.7 cm abscess was aborted as it was not yet loculated and drain placement was not possible.  Can reassess for drainage in 3 days.   -  Leukocytosis/neutrophilia, improved  - Pelvic pain  - Fever, improving  - Abnormal uterine bleeding/fibroid tumors s/p hysterectomy (vaginal-assisted) with ovaries retained    PLAN/RECOMMENDATIONS:   Thank you for asking us to see Anisa Sosa, I recommend the following:  - Follow blood cultures.  If I and D or drain place, send for routine culture, fungal culture and follow.  Check MRSA PCR  - Continue Zosyn for empiric IA coverage  - D/c Vancomycin and change to Daptomycin 6 mg/kg IV daily to empirically cover MRSA and VRE.  Check CPK.  - If worsens, may consider antifungal coverage.  - Gynecology following.   - CT-guided aspiration/drain placement attempted but aborted as not loculated and recommend to repeat CT scan in 3 days if she is still doing poorly      Titus Frias MD saw and examined patient, verified hx and PE, read all radiographic studies, reviewed labs and micro data, and formulated dx, plan for treatment and all medical decision making.      ZOFIA ThackerC for MD Girish Sepulveda PA  7/5/2021  14:15 EDT

## 2021-07-05 NOTE — PROGRESS NOTES
"Pharmacy Consult-Vancomycin Dosing  Anisa Sosa is a  31 y.o. female receiving vancomycin therapy.     Indication: Post-Op abscess; septic  Consulting Provider: Tierney Martinez MD  ID Consult: no    Goal AUC: 400 - 600 mg/L*hr    Current Antimicrobial Therapy  Vancomycin (day 2)  Zosyn 4.5 gm Q8h      Allergies  Allergies as of 07/03/2021    (No Known Allergies)       Labs    Results from last 7 days   Lab Units 07/03/21  2230   BUN mg/dL 6   CREATININE mg/dL 0.72       Results from last 7 days   Lab Units 07/05/21  0756 07/03/21  2230   WBC 10*3/mm3 10.08 14.40*       Evaluation of Dosing     Is Patient on Dialysis or Renal Replacement:     Ht - 177.8 cm (70\")  Wt - 86.2 kg (190 lb 1.6 oz)    Estimated Creatinine Clearance: 133.9 mL/min (by C-G formula based on SCr of 0.72 mg/dL).    Intake & Output (last 3 days)         07/02 0701 - 07/03 0700 07/03 0701 - 07/04 0700 07/04 0701 - 07/05 0700 07/05 0701 - 07/06 0700    P.O.   1930 636    I.V. (mL/kg)   198 (2.3) 1536 (17.8)    IV Piggyback  1150 350     Total Intake(mL/kg)  1150 (13.3) 2478 (28.7) 2172 (25.2)    Urine (mL/kg/hr)   1750 (0.8) 800 (1.4)    Total Output   1750 800    Net  +1150 +728 +1372            Urine Unmeasured Occurrence   1 x             Microbiology and Radiology  Microbiology Results (last 10 days)       Procedure Component Value - Date/Time    COVID PRE-OP / PRE-PROCEDURE SCREENING ORDER (NO ISOLATION) - Swab, Nasopharynx [[id number]]  (Normal) Collected: 07/04/21 0302    Lab Status: Final result Specimen: Swab from Nasopharynx Updated: 07/04/21 0408    Narrative:      The following orders were created for panel order COVID PRE-OP / PRE-PROCEDURE SCREENING ORDER (NO ISOLATION) - Swab, Nasopharynx.  Procedure                               Abnormality         Status                     ---------                               -----------         ------                     COVID-19,CEPHEID,JORGE IN-...[[id number]]  Normal              Final " result                 Please view results for these tests on the individual orders.    COVID-19,CEPHEID,JORGE IN-HOUSE(OR EMERGENT/ADD-ON),NP SWAB IN TRANSPORT MEDIA 3-4 HR TAT - Swab, Nasopharynx [[id number]]  (Normal) Collected: 07/04/21 0302    Lab Status: Final result Specimen: Swab from Nasopharynx Updated: 07/04/21 0408     COVID19 Not Detected    Narrative:      Fact sheet for providers: https://www.fda.gov/media/548244/download     Fact sheet for patients: https://www.fda.gov/media/922066/download  Fact sheet for providers: https://www.fda.gov/media/030709/download     Fact sheet for patients: https://www.fda.gov/media/342040/download    Blood Culture - Blood, Arm, Left [[id number]] Collected: 07/04/21 0017    Lab Status: Preliminary result Specimen: Blood from Arm, Left Updated: 07/05/21 0445     Blood Culture No growth at 24 hours    Blood Culture - Blood, Arm, Right [[id number]] Collected: 07/04/21 0017    Lab Status: Preliminary result Specimen: Blood from Arm, Right Updated: 07/05/21 0445     Blood Culture No growth at 24 hours            Reported Vancomycin Levels                Results from last 7 days   Lab Units 07/05/21  1230   VANCOMYCIN TR mcg/mL <4.00*          [facility] AUC Calculation:    Current AUC: 312 mg/L*hr    Predicted Steady State AUC on Current Dose:  -- mg/L*hr   _________________________________    Predicted Steady State AUC on New Dose:  397 mg/L*hr    Assessment/Plan:   -Pharmacy dosing vancomycin for sepsis  -Goal AUC: 400-600 mg/L*hr  -7/3 Cr: 0.72  -7/5 WBC: 10.08  -24h Tmax: 102.5  -Vancomycin trough 7/5 @1230 - <4.00 mcg/ml    -Will change dose to vancomycin 1000 mg Q8h   -Ordered vancomycin trough 7/7 @0500 prior to dose scheduled for @0600  -BMP x 3 days  -Monitor renal function, cultures, infusion related reactions, and clinical status daily.   -Monitor vancomycin trough levels and adjust as clinically appropriate.       Ellyn Kiser, Pharmacy Intern  7/5/2021  13:29  EDT

## 2021-07-05 NOTE — PROGRESS NOTES
[facility] Medicine Services  PROGRESS NOTE    Patient Name: Anisa Sosa  : 1990  MRN: [id number]    Date of Admission: 7/3/2021  Primary Care Physician: Provider, No Known    Subjective   Subjective     CC:  Abdominal pain    HPI: had fevers and sweats throughout the night.       ROS:  Gen- + fevers  CV- No chest pain, palpitations  Resp- No cough, dyspnea  GI- + abd discomfort          Objective   Objective     Vital Signs:   Temp:  [98.7 °F (37.1 °C)-102.5 °F (39.2 °C)] 99.6 °F (37.6 °C)  Heart Rate:  [86-99] 86  Resp:  [18] 18  BP: (105-118)/(67-75) 105/69        Physical Exam:  Constitutional - no acute distress, nontoxic, in bed  HEENT-NCAT, mucous membranes moist  CV-RRR, S1 S2 normal, no m/r/g  Resp-CTAB, no wheezes, rhonchi or rales  Abd-soft, mild generalized tenderness, nondistended, normoactive bowel sounds  Ext-No lower extremity cyanosis, clubbing or edema bilaterally  Neuro-alert and oriented, speech clear, moves all extremities   Psych-normal affect   Skin- No rash on exposed UE or LE bilaterally    Results Reviewed:  Results from last 7 days   Lab Units 21  0518 21  2230   WBC 10*3/mm3  --  14.40*   HEMOGLOBIN g/dL  --  12.2   HEMATOCRIT %  --  37.2   PLATELETS 10*3/mm3  --  175   INR  1.22*  --    PROCALCITONIN ng/mL  --  0.30*     Results from last 7 days   Lab Units 21  2230   SODIUM mmol/L 139   POTASSIUM mmol/L 3.5   CHLORIDE mmol/L 100   CO2 mmol/L 28.0   BUN mg/dL 6   CREATININE mg/dL 0.72   GLUCOSE mg/dL 99   CALCIUM mg/dL 9.6   ALT (SGPT) U/L 9   AST (SGOT) U/L 13     Estimated Creatinine Clearance: 133.9 mL/min (by C-G formula based on SCr of 0.72 mg/dL).    Microbiology Results Abnormal     Procedure Component Value - Date/Time    Blood Culture - Blood, Arm, Left [[id number]] Collected: 21 0017    Lab Status: Preliminary result Specimen: Blood from Arm, Left Updated: 21 0445     Blood Culture No growth at 24 hours     Blood Culture - Blood, Arm, Right [[id number]] Collected: 07/04/21 0017    Lab Status: Preliminary result Specimen: Blood from Arm, Right Updated: 07/05/21 0445     Blood Culture No growth at 24 hours    COVID PRE-OP / PRE-PROCEDURE SCREENING ORDER (NO ISOLATION) - Swab, Nasopharynx [[id number]]  (Normal) Collected: 07/04/21 0302    Lab Status: Final result Specimen: Swab from Nasopharynx Updated: 07/04/21 0408    Narrative:      The following orders were created for panel order COVID PRE-OP / PRE-PROCEDURE SCREENING ORDER (NO ISOLATION) - Swab, Nasopharynx.  Procedure                               Abnormality         Status                     ---------                               -----------         ------                     COVID-19,CEPHEID,JORGE IN-...[[id number]]  Normal              Final result                 Please view results for these tests on the individual orders.    COVID-19,CEPHEID,JORGE IN-HOUSE(OR EMERGENT/ADD-ON),NP SWAB IN TRANSPORT MEDIA 3-4 HR TAT - Swab, Nasopharynx [[id number]]  (Normal) Collected: 07/04/21 0302    Lab Status: Final result Specimen: Swab from Nasopharynx Updated: 07/04/21 0408     COVID19 Not Detected    Narrative:      Fact sheet for providers: https://www.fda.gov/media/426047/download     Fact sheet for patients: https://www.fda.gov/media/674273/download  Fact sheet for providers: https://www.fda.gov/media/764519/download     Fact sheet for patients: https://www.fda.gov/media/091209/download          Imaging Results (Last 24 Hours)     ** No results found for the last 24 hours. **              I have reviewed the medications:  Scheduled Meds:Pharmacy Consult, , Does not apply, Once  piperacillin-tazobactam, 4.5 g, Intravenous, Q8H  sodium chloride, 10 mL, Intravenous, Q12H  vancomycin, 1,250 mg, Intravenous, Q12H      Continuous Infusions:Pharmacy to dose vancomycin,   sodium chloride, 100 mL/hr, Last Rate: 100 mL/hr (07/04/21 1107)      PRN Meds:.acetaminophen **OR**  acetaminophen **OR** acetaminophen  •  HYDROmorphone  •  ondansetron **OR** ondansetron  •  Pharmacy to dose vancomycin  •  phenol  •  Sodium Chloride (PF)  •  sodium chloride    Assessment/Plan   Assessment & Plan     Active Hospital Problems    Diagnosis  POA   • Intra-abdominal abscess post-procedure [T81.43XA]  Yes      Resolved Hospital Problems   No resolved problems to display.        Brief Hospital Course to date:  Anisa Sosa is a 31 y.o. female with history of elective laparoscopic hysterectomy in [place] on May 27.  She recently reports increased abdominal pain and anorexia as well as no fevers.  CT abdomen pelvis here shows extensive peritoneal inflammation throughout the pelvis with multiple small peripherally enhancing interloop fluid collections scattered in the low pelvis with the largest measuring 5.7 cm in the perirectal area.    Fever  Leukocytosis  Intra-abdominal abscesses  - ongoing fevers with Tmax 102.5  - Leukocytosis resolved, but CRP 31  -Continue vancomycin and Zosyn  -Abscess drainage today if possible, both for therapeutic and diagnostic purposes, will discuss with IR  -Pain control  -ID consultation  -reviewed treatment plan with Gyn Dr Robertson      DVT prophylaxis:  Mechanical DVT prophylaxis orders are present.       Disposition: I expect the patient to be discharged home tbd    CODE STATUS:   Code Status and Medical Interventions:   Ordered at: 07/04/21 0418     Level Of Support Discussed With:    Patient     Code Status:    CPR     Medical Interventions (Level of Support Prior to Arrest):    Full       Walter Prieto MD  07/05/21

## 2021-07-06 LAB
ALBUMIN SERPL-MCNC: 2.7 G/DL (ref 3.5–5.2)
ALBUMIN/GLOB SERPL: 0.9 G/DL
ALP SERPL-CCNC: 76 U/L (ref 39–117)
ALT SERPL W P-5'-P-CCNC: 6 U/L (ref 1–33)
ANION GAP SERPL CALCULATED.3IONS-SCNC: 8 MMOL/L (ref 5–15)
AST SERPL-CCNC: 13 U/L (ref 1–32)
BILIRUB SERPL-MCNC: 0.4 MG/DL (ref 0–1.2)
BUN SERPL-MCNC: 2 MG/DL (ref 6–20)
BUN/CREAT SERPL: 3.2 (ref 7–25)
CALCIUM SPEC-SCNC: 8.7 MG/DL (ref 8.6–10.5)
CHLORIDE SERPL-SCNC: 98 MMOL/L (ref 98–107)
CO2 SERPL-SCNC: 31 MMOL/L (ref 22–29)
CREAT SERPL-MCNC: 0.62 MG/DL (ref 0.57–1)
CRP SERPL-MCNC: 34.97 MG/DL (ref 0–0.5)
DEPRECATED RDW RBC AUTO: 43.9 FL (ref 37–54)
ERYTHROCYTE [DISTWIDTH] IN BLOOD BY AUTOMATED COUNT: 12.6 % (ref 12.3–15.4)
GFR SERPL CREATININE-BSD FRML MDRD: 135 ML/MIN/1.73
GLOBULIN UR ELPH-MCNC: 3.1 GM/DL
GLUCOSE SERPL-MCNC: 98 MG/DL (ref 65–99)
HCT VFR BLD AUTO: 31.1 % (ref 34–46.6)
HGB BLD-MCNC: 10.1 G/DL (ref 12–15.9)
MAGNESIUM SERPL-MCNC: 1.6 MG/DL (ref 1.6–2.6)
MCH RBC QN AUTO: 30.8 PG (ref 26.6–33)
MCHC RBC AUTO-ENTMCNC: 32.5 G/DL (ref 31.5–35.7)
MCV RBC AUTO: 94.8 FL (ref 79–97)
PLATELET # BLD AUTO: 158 10*3/MM3 (ref 140–450)
PMV BLD AUTO: 10.8 FL (ref 6–12)
POTASSIUM SERPL-SCNC: 2.6 MMOL/L (ref 3.5–5.2)
PROT SERPL-MCNC: 5.8 G/DL (ref 6–8.5)
RBC # BLD AUTO: 3.28 10*6/MM3 (ref 3.77–5.28)
SODIUM SERPL-SCNC: 137 MMOL/L (ref 136–145)
WBC # BLD AUTO: 10.84 10*3/MM3 (ref 3.4–10.8)

## 2021-07-06 PROCEDURE — 80053 COMPREHEN METABOLIC PANEL: CPT | Performed by: INTERNAL MEDICINE

## 2021-07-06 PROCEDURE — 83735 ASSAY OF MAGNESIUM: CPT | Performed by: INTERNAL MEDICINE

## 2021-07-06 PROCEDURE — 25010000002 DAPTOMYCIN PER 1 MG: Performed by: PHYSICIAN ASSISTANT

## 2021-07-06 PROCEDURE — 25010000002 HYDROMORPHONE PER 4 MG: Performed by: INTERNAL MEDICINE

## 2021-07-06 PROCEDURE — 99232 SBSQ HOSP IP/OBS MODERATE 35: CPT | Performed by: INTERNAL MEDICINE

## 2021-07-06 PROCEDURE — 25010000003 MAGNESIUM SULFATE 4 GM/100ML SOLUTION: Performed by: INTERNAL MEDICINE

## 2021-07-06 PROCEDURE — 99232 SBSQ HOSP IP/OBS MODERATE 35: CPT | Performed by: OBSTETRICS & GYNECOLOGY

## 2021-07-06 PROCEDURE — 25010000002 PIPERACILLIN SOD-TAZOBACTAM PER 1 G: Performed by: INTERNAL MEDICINE

## 2021-07-06 PROCEDURE — 25010000002 ONDANSETRON PER 1 MG: Performed by: INTERNAL MEDICINE

## 2021-07-06 PROCEDURE — 25010000002 ENOXAPARIN PER 10 MG: Performed by: INTERNAL MEDICINE

## 2021-07-06 PROCEDURE — 86140 C-REACTIVE PROTEIN: CPT | Performed by: INTERNAL MEDICINE

## 2021-07-06 PROCEDURE — 85027 COMPLETE CBC AUTOMATED: CPT | Performed by: INTERNAL MEDICINE

## 2021-07-06 RX ORDER — POTASSIUM CHLORIDE 7.45 MG/ML
10 INJECTION INTRAVENOUS
Status: DISCONTINUED | OUTPATIENT
Start: 2021-07-06 | End: 2021-07-12 | Stop reason: HOSPADM

## 2021-07-06 RX ORDER — HYDROCODONE BITARTRATE AND ACETAMINOPHEN 7.5; 325 MG/1; MG/1
1 TABLET ORAL EVERY 6 HOURS PRN
Status: DISCONTINUED | OUTPATIENT
Start: 2021-07-06 | End: 2021-07-07

## 2021-07-06 RX ORDER — POTASSIUM CHLORIDE 1.5 G/1.77G
40 POWDER, FOR SOLUTION ORAL AS NEEDED
Status: DISCONTINUED | OUTPATIENT
Start: 2021-07-06 | End: 2021-07-12 | Stop reason: HOSPADM

## 2021-07-06 RX ORDER — MAGNESIUM SULFATE HEPTAHYDRATE 40 MG/ML
2 INJECTION, SOLUTION INTRAVENOUS AS NEEDED
Status: DISCONTINUED | OUTPATIENT
Start: 2021-07-06 | End: 2021-07-09

## 2021-07-06 RX ORDER — POTASSIUM CHLORIDE 750 MG/1
40 CAPSULE, EXTENDED RELEASE ORAL AS NEEDED
Status: DISCONTINUED | OUTPATIENT
Start: 2021-07-06 | End: 2021-07-12 | Stop reason: HOSPADM

## 2021-07-06 RX ORDER — MAGNESIUM SULFATE HEPTAHYDRATE 40 MG/ML
4 INJECTION, SOLUTION INTRAVENOUS AS NEEDED
Status: DISCONTINUED | OUTPATIENT
Start: 2021-07-06 | End: 2021-07-09

## 2021-07-06 RX ORDER — DOCUSATE SODIUM 100 MG/1
100 CAPSULE, LIQUID FILLED ORAL 2 TIMES DAILY
Status: DISCONTINUED | OUTPATIENT
Start: 2021-07-06 | End: 2021-07-09

## 2021-07-06 RX ADMIN — SODIUM CHLORIDE, PRESERVATIVE FREE 10 ML: 5 INJECTION INTRAVENOUS at 21:34

## 2021-07-06 RX ADMIN — SODIUM CHLORIDE 100 ML/HR: 9 INJECTION, SOLUTION INTRAVENOUS at 06:14

## 2021-07-06 RX ADMIN — HYDROMORPHONE HYDROCHLORIDE 0.5 MG: 1 INJECTION, SOLUTION INTRAMUSCULAR; INTRAVENOUS; SUBCUTANEOUS at 06:37

## 2021-07-06 RX ADMIN — ENOXAPARIN SODIUM 40 MG: 40 INJECTION SUBCUTANEOUS at 16:16

## 2021-07-06 RX ADMIN — HYDROCODONE BITARTRATE AND ACETAMINOPHEN 1 TABLET: 7.5; 325 TABLET ORAL at 09:43

## 2021-07-06 RX ADMIN — POLYETHYLENE GLYCOL 3350 17 G: 17 POWDER, FOR SOLUTION ORAL at 08:30

## 2021-07-06 RX ADMIN — TAZOBACTAM SODIUM AND PIPERACILLIN SODIUM 4.5 G: 500; 4 INJECTION, SOLUTION INTRAVENOUS at 04:43

## 2021-07-06 RX ADMIN — SODIUM CHLORIDE 100 ML/HR: 9 INJECTION, SOLUTION INTRAVENOUS at 21:40

## 2021-07-06 RX ADMIN — HYDROMORPHONE HYDROCHLORIDE 0.5 MG: 1 INJECTION, SOLUTION INTRAMUSCULAR; INTRAVENOUS; SUBCUTANEOUS at 02:03

## 2021-07-06 RX ADMIN — MAGNESIUM SULFATE HEPTAHYDRATE 4 G: 40 INJECTION, SOLUTION INTRAVENOUS at 21:27

## 2021-07-06 RX ADMIN — HYDROCODONE BITARTRATE AND ACETAMINOPHEN 1 TABLET: 7.5; 325 TABLET ORAL at 21:27

## 2021-07-06 RX ADMIN — POTASSIUM CHLORIDE 40 MEQ: 750 CAPSULE, EXTENDED RELEASE ORAL at 21:27

## 2021-07-06 RX ADMIN — ONDANSETRON 4 MG: 2 INJECTION INTRAMUSCULAR; INTRAVENOUS at 09:47

## 2021-07-06 RX ADMIN — DOCUSATE SODIUM 100 MG: 100 CAPSULE, LIQUID FILLED ORAL at 21:27

## 2021-07-06 RX ADMIN — HYDROMORPHONE HYDROCHLORIDE 0.5 MG: 1 INJECTION, SOLUTION INTRAMUSCULAR; INTRAVENOUS; SUBCUTANEOUS at 18:43

## 2021-07-06 RX ADMIN — ACETAMINOPHEN 650 MG: 325 TABLET, FILM COATED ORAL at 04:43

## 2021-07-06 RX ADMIN — POTASSIUM CHLORIDE 40 MEQ: 750 CAPSULE, EXTENDED RELEASE ORAL at 16:16

## 2021-07-06 RX ADMIN — HYDROMORPHONE HYDROCHLORIDE 0.5 MG: 1 INJECTION, SOLUTION INTRAMUSCULAR; INTRAVENOUS; SUBCUTANEOUS at 04:43

## 2021-07-06 RX ADMIN — TAZOBACTAM SODIUM AND PIPERACILLIN SODIUM 4.5 G: 500; 4 INJECTION, SOLUTION INTRAVENOUS at 11:49

## 2021-07-06 RX ADMIN — HYDROMORPHONE HYDROCHLORIDE 0.5 MG: 1 INJECTION, SOLUTION INTRAMUSCULAR; INTRAVENOUS; SUBCUTANEOUS at 08:30

## 2021-07-06 RX ADMIN — HYDROCODONE BITARTRATE AND ACETAMINOPHEN 1 TABLET: 7.5; 325 TABLET ORAL at 15:35

## 2021-07-06 RX ADMIN — SODIUM CHLORIDE, PRESERVATIVE FREE 10 ML: 5 INJECTION INTRAVENOUS at 08:30

## 2021-07-06 RX ADMIN — DAPTOMYCIN 450 MG: 500 INJECTION, POWDER, LYOPHILIZED, FOR SOLUTION INTRAVENOUS at 17:28

## 2021-07-06 RX ADMIN — POTASSIUM CHLORIDE 40 MEQ: 750 CAPSULE, EXTENDED RELEASE ORAL at 09:43

## 2021-07-06 RX ADMIN — HYDROMORPHONE HYDROCHLORIDE 0.5 MG: 1 INJECTION, SOLUTION INTRAMUSCULAR; INTRAVENOUS; SUBCUTANEOUS at 21:53

## 2021-07-06 RX ADMIN — TAZOBACTAM SODIUM AND PIPERACILLIN SODIUM 4.5 G: 500; 4 INJECTION, SOLUTION INTRAVENOUS at 21:40

## 2021-07-06 NOTE — PROGRESS NOTES
BERNABE Araya  Anisa Sosa  : 1990  MRN: [id number]  CSN: [id number]    Hospital Day # 3   POD#40 s/p LAVH/BS at outside hospital   CC: hospital follow up of post op pelvic abscesses  Subjective   She reports pain is a little improved but still taking IV dilaudid q2 hours. Passing flatus but no BM. No emesis. Tolerating small amount of diet. Reports rectal pressure has decreased some. No fevers overnight.      Objective     Min/max vitals past 24 hours:   Temp  Min: 97.9 °F (36.6 °C)  Max: 100.9 °F (38.3 °C)  BP  Min: 96/62  Max: 110/71  Pulse  Min: 78  Max: 88  Resp  Min: 18  Max: 18        I/O last 3 completed shifts:  In: 3247 [P.O.:1161; I.V.:1536; IV Piggyback:550]  Out: 3700 [Urine:3700]    General: well developed; well nourished  no acute distress   Abdomen: Soft, mildly TTP in lower quadrants  +BS throughout   no umbilical or inguinal hernias are present  no hepato-splenomegaly  incision is clean, dry, intact and without drainage   Pelvic: Not performed   Ext: Calves NT     CBC:   Lab Results   Component Value Date     2021    HGB 10.1 (L) 2021    HCT 31.1 (L) 2021    WBC 10.84 (H) 2021     CBC w/ diff:   Lab Results   Component Value Date     2021    HGB 10.1 (L) 2021    HCT 31.1 (L) 2021    MCV 94.8 2021    RDW 12.6 2021    WBC 10.84 (H) 2021    NEUTRORELPCT 89.7 (H) 2021    AUTOIGPER 0.6 (H) 2021    LYMPHORELPCT 3.3 (L) 2021    MONORELPCT 4.9 (L) 2021    EOSRELPCT 1.3 2021    BASORELPCT 0.2 2021     CMP:   Lab Results   Component Value Date     2021    K 2.6 (C) 2021    CL 98 2021    CO2 31.0 (H) 2021    BUN 2 (L) 2021    CREATININE 0.62 2021    GLUCOSE 98 2021    ALBUMIN 2.70 (L) 2021    CALCIUM 8.7 2021    AST 13 2021    ALT 6 2021    BILITOT 0.4 2021          Assessment   1. POD#40 s/p LAVH/BS for AUB-L,  pelvic pain per patient.   2. Post operative pelvic abscesses - unable to be drained by IR yesterday      Plan   1. Continue IV antibiotics. If patient were to fever again would consult gyn oncology especially if IR still unable to drain 5cm abscess  2. Attempt to transition to PO narcotics.   3. Agree with bowel regimen   4. Discussed plan of care with patient and all questions were answered to the best of my ability.     Amee Robertson MD  7/6/2021  08:40 EDT

## 2021-07-06 NOTE — PROGRESS NOTES
[facility] Medicine Services  PROGRESS NOTE    Patient Name: Anisa Sosa  : 1990  MRN: [id number]    Date of Admission: 7/3/2021  Primary Care Physician: Provider, No Known    Subjective   Subjective     CC:  Abdominal pain    HPI: low abdominal pain continues. No bm. Low grade fevers continue. Not very hungry, hasnt eaten much for a week      ROS:  Gen- + fevers  CV- No chest pain, palpitations  Resp- No cough, dyspnea  GI- + abd pain        Objective   Objective     Vital Signs:   Temp:  [97.9 °F (36.6 °C)-100.9 °F (38.3 °C)] 97.9 °F (36.6 °C)  Heart Rate:  [78-88] 78  Resp:  [18] 18  BP: ()/(60-71) 106/60        Physical Exam:  Constitutional - no acute distress, nontoxic, in bed  HEENT-NCAT, mucous membranes moist  CV-RRR, S1 S2 normal, no m/r/g  Resp-CTAB, no wheezes, rhonchi or rales  Abd-soft, mild lower quadrant tenderness, nondistended, normoactive bowel sounds  Ext-No lower extremity cyanosis, clubbing or edema bilaterally  Neuro-alert and oriented x 3, speech clear, moves all extremities   Psych-normal affect   Skin- No rash on exposed UE or LE bilaterally    Results Reviewed:  Results from last 7 days   Lab Units 21  0713 21  0756 21  0518 21  2230   WBC 10*3/mm3 10.84* 10.08  --  14.40*   HEMOGLOBIN g/dL 10.1* 11.0*  --  12.2   HEMATOCRIT % 31.1* 33.5*  --  37.2   PLATELETS 10*3/mm3 158 170  --  175   INR   --   --  1.22*  --    PROCALCITONIN ng/mL  --  0.21  --  0.30*     Results from last 7 days   Lab Units 21  2230   SODIUM mmol/L 139   POTASSIUM mmol/L 3.5   CHLORIDE mmol/L 100   CO2 mmol/L 28.0   BUN mg/dL 6   CREATININE mg/dL 0.72   GLUCOSE mg/dL 99   CALCIUM mg/dL 9.6   ALT (SGPT) U/L 9   AST (SGOT) U/L 13     Estimated Creatinine Clearance: 133.5 mL/min (by C-G formula based on SCr of 0.72 mg/dL).    Microbiology Results Abnormal     Procedure Component Value - Date/Time    Blood Culture - Blood, Arm, Left [[id number]]  Collected: 07/04/21 0017    Lab Status: Preliminary result Specimen: Blood from Arm, Left Updated: 07/06/21 0445     Blood Culture No growth at 2 days    Blood Culture - Blood, Arm, Right [[id number]] Collected: 07/04/21 0017    Lab Status: Preliminary result Specimen: Blood from Arm, Right Updated: 07/06/21 0445     Blood Culture No growth at 2 days    MRSA Screen, PCR (Inpatient) - Swab, Nares [[id number]]  (Normal) Collected: 07/05/21 1828    Lab Status: Final result Specimen: Swab from Nares Updated: 07/05/21 2036     MRSA PCR Negative    Narrative:      MRSA Negative    COVID PRE-OP / PRE-PROCEDURE SCREENING ORDER (NO ISOLATION) - Swab, Nasopharynx [[id number]]  (Normal) Collected: 07/04/21 0302    Lab Status: Final result Specimen: Swab from Nasopharynx Updated: 07/04/21 0408    Narrative:      The following orders were created for panel order COVID PRE-OP / PRE-PROCEDURE SCREENING ORDER (NO ISOLATION) - Swab, Nasopharynx.  Procedure                               Abnormality         Status                     ---------                               -----------         ------                     COVID-19,CEPHEID,JORGE IN-...[[id number]]  Normal              Final result                 Please view results for these tests on the individual orders.    COVID-19,CEPHEID,JORGE IN-HOUSE(OR EMERGENT/ADD-ON),NP SWAB IN TRANSPORT MEDIA 3-4 HR TAT - Swab, Nasopharynx [[id number]]  (Normal) Collected: 07/04/21 0302    Lab Status: Final result Specimen: Swab from Nasopharynx Updated: 07/04/21 0408     COVID19 Not Detected    Narrative:      Fact sheet for providers: https://www.fda.gov/media/847755/download     Fact sheet for patients: https://www.fda.gov/media/952487/download  Fact sheet for providers: https://www.fda.gov/media/106721/download     Fact sheet for patients: https://www.fda.gov/media/984178/download          Imaging Results (Last 24 Hours)     Procedure Component Value Units Date/Time    CT Guided Biopsy Aspiration  Injection [[id number]] Collected: 07/05/21 1220     Updated: 07/05/21 1230    Narrative:      EXAMINATION: CT GUIDED ASPIRATION-perirectal collection.     INDICATION: History of hysterectomy approximately one month prior to  presentation with recent fevers and abdominal pain found to have  multifocal fluid collections within the lower abdomen and pelvis  suspicious for abscesses. Presents to interventional radiology for  CT-guided transgluteal perirectal drain placement.     TECHNIQUE: After informed written consent, the patient was placed prone  on the CT table.  CT revealed an approximately 5.5 x 2.8 cm  collection in the pouch of Drew. This was amenable to drainage. The  patient was prepped and draped in the normal sterile fashion. 1%  lidocaine was infiltrated for local anesthesia. Under CT fluoroscopy  scopic guidance, an 18-gauge Chiba needle was advanced into the  collection. Discussion was found to be quite mobile and the needle was  unable to be advanced into the lesion proper. After 2 attempts, I  decided to abort the procedure. The patient tolerated the procedure  well.     The radiation dose reduction device was turned on for each scan per the  ALARA (As Low as Reasonably Achievable) protocol.     COMPARISON: CT abdomen and pelvis from 7/3/2021     FINDINGS: Mobile perirectal collection possibly representing an early  abscess. Unable to be punctured due to mobility. Procedure was aborted.          Impression:      Mobile perirectal collection similar to that seen on prior  imaging. Due to mobility, procedure was aborted. Recommend follow-up  imaging in 3 days to evaluate for possible reattempt.        This report was finalized on 7/5/2021 12:27 PM by Catalina Bowman.                 I have reviewed the medications:  Scheduled Meds:DAPTOmycin, 6 mg/kg (Adjusted), Intravenous, Q24H  piperacillin-tazobactam, 4.5 g, Intravenous, Q8H  polyethylene glycol, 17 g, Oral, Daily  sodium chloride, 10 mL,  Intravenous, Q12H      Continuous Infusions:sodium chloride, 100 mL/hr, Last Rate: 100 mL/hr (07/06/21 0614)      PRN Meds:.•  acetaminophen **OR** acetaminophen **OR** acetaminophen  •  HYDROmorphone  •  ondansetron **OR** ondansetron  •  phenol  •  Sodium Chloride (PF)  •  sodium chloride    Assessment/Plan   Assessment & Plan     Active Hospital Problems    Diagnosis  POA   • Intra-abdominal abscess post-procedure [T81.43XA]  Yes      Resolved Hospital Problems   No resolved problems to display.        Brief Hospital Course to date:  Anisa Sosa is a 31 y.o. female with history of elective laparoscopic hysterectomy in [place] on May 27.  She recently reports increased abdominal pain and anorexia as well as no fevers.  CT abdomen pelvis here shows extensive peritoneal inflammation throughout the pelvis with multiple small peripherally enhancing interloop fluid collections scattered in the low pelvis with the largest measuring 5.7 cm in the perirectal area.    Fever  Leukocytosis  Intra-abdominal abscesses  - ongoing fevers with Tmax 100.9  - Leukocytosis resolved, but CRP increased to 34 today.  -Continue daptomycin/zosyn; consider antifugals if clinical worsening  -no discrete walled abscess amenable for drainage on 7/5 -- can consider re-imaging in 2-3 days as needed  -Pain control    DVT prophylaxis: lovenox    Disposition: I expect the patient to be discharged home tbd    CODE STATUS:   Code Status and Medical Interventions:   Ordered at: 07/04/21 0418     Level Of Support Discussed With:    Patient     Code Status:    CPR     Medical Interventions (Level of Support Prior to Arrest):    Full       Walter Prieto MD  07/06/21

## 2021-07-06 NOTE — PAYOR COMM NOTE
"Ref # [id number]  Tressa Martin RN, BSN  Phone # [phone]  Fax # [phone]  Anisa Sosa (31 y.o. Female)     Date of Birth Social Security Number Address Home Phone MRN    1990  [address] [facility] [address] [phone] [id number]    Roman Catholic Marital Status          Mormon Single       Admission Date Admission Type Admitting Provider Attending Provider Department, Room/Bed    7/3/21 Emergency Walter Prieto MD Sloan, Walker E, MD [facility] 6B, N624/1    Discharge Date Discharge Disposition Discharge Destination                       Attending Provider: Walter Prieto MD    Allergies: No Known Allergies    Isolation: None   Infection: None   Code Status: CPR    Ht: 177.8 cm (70\")   Wt: 85.7 kg (188 lb 14.4 oz)    Admission Cmt: None   Principal Problem: None                Active Insurance as of 7/3/2021     Primary Coverage     Payor Plan Insurance Group Employer/Plan Group    [employer] [facility] PPO [id number]     Payor Plan Address Payor Plan Phone Number Payor Plan Fax Number Effective Dates    [address] [phone]  1/1/2019 - None Entered    [facility] [address]       Subscriber Name Subscriber Birth Date Member ID       ANISA SOSA 1990 [id number]                 Current Facility-Administered Medications   Medication Dose Route Frequency Provider Last Rate Last Admin   • acetaminophen (TYLENOL) tablet 650 mg  650 mg Oral Q4H PRN Tierney Martinez MD   650 mg at 07/06/21 0443    Or   • acetaminophen (TYLENOL) 160 MG/5ML solution 650 mg  650 mg Oral Q4H PRN Tierney Martinez MD        Or   • acetaminophen (TYLENOL) suppository 650 mg  650 mg Rectal Q4H PRN Tierney Martinez MD       • DAPTOmycin (CUBICIN) 450 mg in sodium chloride 0.9 % 50 mL IVPB  6 mg/kg (Adjusted) Intravenous Q24H Girish Martin  mL/hr at 07/05/21 1844 450 mg at 07/05/21 1844   • HYDROcodone-acetaminophen (NORCO) 7.5-325 MG per tablet 1 " tablet  1 tablet Oral Q6H PRN Walter Prieto MD   1 tablet at 07/06/21 0943   • HYDROmorphone (DILAUDID) injection 0.5 mg  0.5 mg Intravenous Q2H PRN Tierney Martinez MD   0.5 mg at 07/06/21 0830   • Magnesium Sulfate 2 gram Bolus, followed by 8 gram infusion (total Mg dose 10 grams)- Mg less than or equal to 1mg/dL  2 g Intravenous PRN Walter Prieto MD        Or   • Magnesium Sulfate 2 gram / 50mL Infusion (GIVE X 3 BAGS TO EQUAL 6GM TOTAL DOSE) - Mg 1.1 - 1.5 mg/dl  2 g Intravenous PRN Walter Prieto MD        Or   • Magnesium Sulfate 4 gram infusion- Mg 1.6-1.9 mg/dL  4 g Intravenous PRN Walter Prieto MD       • ondansetron (ZOFRAN) tablet 4 mg  4 mg Oral Q6H PRN Tierney Martinez MD        Or   • ondansetron (ZOFRAN) injection 4 mg  4 mg Intravenous Q6H PRN Tierney Martinez MD   4 mg at 07/06/21 0947   • phenol (CHLORASEPTIC) 1.4 % liquid 2 spray  2 spray Mouth/Throat Q2H PRN Corin Brannon APRN       • piperacillin-tazobactam (ZOSYN) 4.5 g in iso-osmotic dextrose 100 mL IVPB (premix)  4.5 g Intravenous Q8H Tierney Martinez MD   4.5 g at 07/06/21 1149   • polyethylene glycol (MIRALAX) packet 17 g  17 g Oral Daily Walter Prieto MD   17 g at 07/06/21 0830   • potassium chloride (MICRO-K) CR capsule 40 mEq  40 mEq Oral PRN Walter Prieto MD   40 mEq at 07/06/21 0943    Or   • potassium chloride (KLOR-CON) packet 40 mEq  40 mEq Oral PRN Walter Prieto MD        Or   • potassium chloride 10 mEq in 100 mL IVPB  10 mEq Intravenous Q1H PRN Walter Prieto MD       • Sodium Chloride (PF) 0.9 % 10 mL  10 mL Intravenous PRN Tierney Martinez MD       • sodium chloride 0.9 % flush 1-10 mL  1-10 mL Intravenous PRN Tierney Martinez MD   10 mL at 07/04/21 1638   • sodium chloride 0.9 % flush 10 mL  10 mL Intravenous Q12H Tierney Martinez MD   10 mL at 07/06/21 0830   • sodium chloride 0.9 % infusion  100 mL/hr Intravenous Continuous Walter Prieto  mL/hr at 07/06/21 0614 100 mL/hr at 07/06/21 0614      Lab Results (last 72 hours)     Procedure Component Value Units Date/Time    Magnesium [[id number]]  (Normal) Collected: 07/06/21 0713    Specimen: Blood Updated: 07/06/21 0916     Magnesium 1.6 mg/dL     Comprehensive Metabolic Panel [[id number]]  (Abnormal) Collected: 07/06/21 0713    Specimen: Blood Updated: 07/06/21 0841     Glucose 98 mg/dL      BUN 2 mg/dL      Creatinine 0.62 mg/dL      Sodium 137 mmol/L      Potassium 2.6 mmol/L      Comment: Slight hemolysis detected by analyzer. Results may be affected.        Chloride 98 mmol/L      CO2 31.0 mmol/L      Calcium 8.7 mg/dL      Total Protein 5.8 g/dL      Albumin 2.70 g/dL      ALT (SGPT) 6 U/L      AST (SGOT) 13 U/L      Alkaline Phosphatase 76 U/L      Total Bilirubin 0.4 mg/dL      eGFR  African Amer 135 mL/min/1.73      Globulin 3.1 gm/dL      A/G Ratio 0.9 g/dL      BUN/Creatinine Ratio 3.2     Anion Gap 8.0 mmol/L     Narrative:      GFR Normal >60  Chronic Kidney Disease <60  Kidney Failure <15      C-reactive Protein [[id number]]  (Abnormal) Collected: 07/06/21 0713    Specimen: Blood Updated: 07/06/21 0831     C-Reactive Protein 34.97 mg/dL     CBC (No Diff) [[id number]]  (Abnormal) Collected: 07/06/21 0713    Specimen: Blood Updated: 07/06/21 0800     WBC 10.84 10*3/mm3      RBC 3.28 10*6/mm3      Hemoglobin 10.1 g/dL      Hematocrit 31.1 %      MCV 94.8 fL      MCH 30.8 pg      MCHC 32.5 g/dL      RDW 12.6 %      RDW-SD 43.9 fl      MPV 10.8 fL      Platelets 158 10*3/mm3     Blood Culture - Blood, Arm, Left [[id number]] Collected: 07/04/21 0017    Specimen: Blood from Arm, Left Updated: 07/06/21 0445     Blood Culture No growth at 2 days    Blood Culture - Blood, Arm, Right [[id number]] Collected: 07/04/21 0017    Specimen: Blood from Arm, Right Updated: 07/06/21 0445     Blood Culture No growth at 2 days    MRSA Screen, PCR (Inpatient) - Swab, Nares [[id number]]  (Normal) Collected: 07/05/21 1828    Specimen: Swab from Nares Updated: 07/05/21 2036      MRSA PCR Negative    Narrative:      MRSA Negative    CK [[id number]]  (Normal) Collected: 07/05/21 0756    Specimen: Blood Updated: 07/05/21 1722     Creatine Kinase 32 U/L     Vancomycin, Trough [[id number]]  (Abnormal) Collected: 07/05/21 1230    Specimen: Blood Updated: 07/05/21 1311     Vancomycin Trough <4.00 mcg/mL     Narrative:      Therapeutic Ranges for Vancomycin    Vancomycin Random   5.0-40.0 mcg/mL  Vancomycin Trough   5.0-20.0 mcg/mL  Vancomycin Peak     20.0-40.0 mcg/mL    C-reactive Protein [[id number]]  (Abnormal) Collected: 07/05/21 0756    Specimen: Blood Updated: 07/05/21 0912     C-Reactive Protein 31.69 mg/dL     Manual Differential [[id number]]  (Abnormal) Collected: 07/05/21 0756    Specimen: Blood Updated: 07/05/21 0903     Neutrophil % 72.0 %      Lymphocyte % 9.0 %      Monocyte % 3.0 %      Eosinophil % 1.0 %      Basophil % 0.0 %      Bands %  14.0 %      Metamyelocyte % 1.0 %      Neutrophils Absolute 8.67 10*3/mm3      Lymphocytes Absolute 0.91 10*3/mm3      Monocytes Absolute 0.30 10*3/mm3      Eosinophils Absolute 0.10 10*3/mm3      Basophils Absolute 0.00 10*3/mm3      nRBC 0.0 /100 WBC      RBC Morphology Normal     WBC Morphology Normal     Platelet Morphology Normal    CBC & Differential [[id number]]  (Abnormal) Collected: 07/05/21 0756    Specimen: Blood Updated: 07/05/21 0903    Narrative:      The following orders were created for panel order CBC & Differential.  Procedure                               Abnormality         Status                     ---------                               -----------         ------                     Scan Slide[[id number]]                                                                  CBC Auto Differential[[id number]]        Abnormal            Final result                 Please view results for these tests on the individual orders.    CBC Auto Differential [[id number]]  (Abnormal) Collected: 07/05/21 0756    Specimen: Blood Updated: 07/05/21  "0903     WBC 10.08 10*3/mm3      RBC 3.60 10*6/mm3      Hemoglobin 11.0 g/dL      Hematocrit 33.5 %      MCV 93.1 fL      MCH 30.6 pg      MCHC 32.8 g/dL      RDW 12.7 %      RDW-SD 43.5 fl      MPV 10.7 fL      Platelets 170 10*3/mm3     Procalcitonin [[id number]]  (Normal) Collected: 07/05/21 0756    Specimen: Blood Updated: 07/05/21 0900     Procalcitonin 0.21 ng/mL     Narrative:      As a Marker for Sepsis (Non-Neonates):     1. <0.5 ng/mL represents a low risk of severe sepsis and/or septic shock.  2. >2 ng/mL represents a high risk of severe sepsis and/or septic shock.    As a Marker for Lower Respiratory Tract Infections that require antibiotic therapy:  PCT on Admission     Antibiotic Therapy             6-12 Hrs later  >0.5                          Strongly Recommended            >0.25 - <0.5             Recommended  0.1 - 0.25                  Discouraged                       Remeasure/reassess PCT  <0.1                         Strongly Discouraged         Remeasure/reassess PCT      As 28 day mortality risk marker: \"Change in Procalcitonin Result\" (>80% or <=80%) if Day 0 (or Day 1) and Day 4 values are available. Refer to http://www.[employer]-pct-calculator.com/    Change in PCT <=80 %   A decrease of PCT levels below or equal to 80% defines a positive change in PCT test result representing a higher risk for 28-day all-cause mortality of patients diagnosed with severe sepsis or septic shock.    Change in PCT >80 %   A decrease of PCT levels of more than 80% defines a negative change in PCT result representing a lower risk for 28-day all-cause mortality of patients diagnosed with severe sepsis or septic shock.              Results may be falsely decreased if patient taking Biotin.     Urinalysis, Microscopic Only - Urine, Clean Catch [[id number]]  (Abnormal) Collected: 07/04/21 1642    Specimen: Urine, Clean Catch Updated: 07/04/21 1654     RBC, UA 3-6 /HPF      WBC, UA 3-5 /HPF      Bacteria, UA None Seen " /HPF      Squamous Epithelial Cells, UA 3-6 /HPF      Hyaline Casts, UA 0-6 /LPF      Methodology Automated Microscopy    Urinalysis With Culture If Indicated - Urine, Clean Catch [[id number]]  (Abnormal) Collected: 07/04/21 1642    Specimen: Urine, Clean Catch Updated: 07/04/21 1653     Color, UA Yellow     Appearance, UA Cloudy     pH, UA 6.0     Specific Gravity, UA 1.026     Glucose, UA Negative     Ketones, UA Negative     Bilirubin, UA Negative     Blood, UA Negative     Protein, UA Negative     Leuk Esterase, UA Negative     Nitrite, UA Negative     Urobilinogen, UA 4.0 E.U./dL    aPTT [[id number]]  (Normal) Collected: 07/04/21 0518    Specimen: Blood Updated: 07/04/21 0648     PTT 29.9 seconds     Narrative:      PTT = The equivalent PTT values for the therapeutic range of heparin levels at 0.3 to 0.5 U/ml are 55 to 70 seconds.    Protime-INR [[id number]]  (Abnormal) Collected: 07/04/21 0518    Specimen: Blood Updated: 07/04/21 0648     Protime 15.0 Seconds      INR 1.22    COVID PRE-OP / PRE-PROCEDURE SCREENING ORDER (NO ISOLATION) - Swab, Nasopharynx [[id number]]  (Normal) Collected: 07/04/21 0302    Specimen: Swab from Nasopharynx Updated: 07/04/21 0408    Narrative:      The following orders were created for panel order COVID PRE-OP / PRE-PROCEDURE SCREENING ORDER (NO ISOLATION) - Swab, Nasopharynx.  Procedure                               Abnormality         Status                     ---------                               -----------         ------                     COVID-19,CEPHEID,JORGE IN-...[[id number]]  Normal              Final result                 Please view results for these tests on the individual orders.    COVID-19,CEPHEID,JORGE IN-HOUSE(OR EMERGENT/ADD-ON),NP SWAB IN TRANSPORT MEDIA 3-4 HR TAT - Swab, Nasopharynx [[id number]]  (Normal) Collected: 07/04/21 0302    Specimen: Swab from Nasopharynx Updated: 07/04/21 0408     COVID19 Not Detected    Narrative:      Fact sheet for providers:  https://www.fda.gov/media/343078/download     Fact sheet for patients: https://www.fda.gov/media/828548/download  Fact sheet for providers: https://www.fda.gov/media/045800/download     Fact sheet for patients: https://www.fda.gov/media/290222/download    [place] Draw [[id number]] Collected: 07/03/21 2230    Specimen: Blood Updated: 07/04/21 0230    Narrative:      The following orders were created for panel order [place] Draw.  Procedure                               Abnormality         Status                     ---------                               -----------         ------                     Green Top (Gel)[[id number]]                                  Final result               Lavender Top[[id number]]                                     Final result               Gold Top - SST[[id number]]                                   Final result               Blevins Top[[id number]]                                         Final result                 Please view results for these tests on the individual orders.    B[place]ins Top [[id number]] Collected: 07/03/21 2230    Specimen: Blood Updated: 07/04/21 0230     Extra Tube Hold for add-ons.     Comment: Auto resulted.       Procalcitonin [[id number]]  (Abnormal) Collected: 07/03/21 2230    Specimen: Blood Updated: 07/04/21 0039     Procalcitonin 0.30 ng/mL     Narrative:      As a Marker for Sepsis (Non-Neonates):     1. <0.5 ng/mL represents a low risk of severe sepsis and/or septic shock.  2. >2 ng/mL represents a high risk of severe sepsis and/or septic shock.    As a Marker for Lower Respiratory Tract Infections that require antibiotic therapy:  PCT on Admission     Antibiotic Therapy             6-12 Hrs later  >0.5                          Strongly Recommended            >0.25 - <0.5             Recommended  0.1 - 0.25                  Discouraged                       Remeasure/reassess PCT  <0.1                         Strongly Discouraged         Remeasure/reassess PCT   "    As 28 day mortality risk marker: \"Change in Procalcitonin Result\" (>80% or <=80%) if Day 0 (or Day 1) and Day 4 values are available. Refer to http://www.[facility]-pct-calculator.com/    Change in PCT <=80 %   A decrease of PCT levels below or equal to 80% defines a positive change in PCT test result representing a higher risk for 28-day all-cause mortality of patients diagnosed with severe sepsis or septic shock.    Change in PCT >80 %   A decrease of PCT levels of more than 80% defines a negative change in PCT result representing a lower risk for 28-day all-cause mortality of patients diagnosed with severe sepsis or septic shock.              Results may be falsely decreased if patient taking Biotin.     Lactic Acid, Plasma [[id number]]  (Normal) Collected: 07/03/21 2230    Specimen: Blood Updated: 07/04/21 0023     Lactate 0.8 mmol/L      Comment: Falsely depressed results may occur on samples drawn from patients receiving N-Acetylcysteine (NAC) or Metamizole.       Green Top (Gel) [[id number]] Collected: 07/03/21 2230    Specimen: Blood Updated: 07/03/21 2330     Extra Tube Hold for add-ons.     Comment: Auto resulted.       Lavender Top [[id number]] Collected: 07/03/21 2230    Specimen: Blood Updated: 07/03/21 2330     Extra Tube hold for add-on     Comment: Auto resulted       Gold Top - SST [[id number]] Collected: 07/03/21 2230    Specimen: Blood Updated: 07/03/21 2330     Extra Tube Hold for add-ons.     Comment: Auto resulted.       Comprehensive Metabolic Panel [[id number]] Collected: 07/03/21 2230    Specimen: Blood Updated: 07/03/21 2306     Glucose 99 mg/dL      BUN 6 mg/dL      Creatinine 0.72 mg/dL      Sodium 139 mmol/L      Potassium 3.5 mmol/L      Comment: Slight hemolysis detected by analyzer. Results may be affected.        Chloride 100 mmol/L      CO2 28.0 mmol/L      Calcium 9.6 mg/dL      Total Protein 7.2 g/dL      Albumin 3.60 g/dL      ALT (SGPT) 9 U/L      AST (SGOT) 13 U/L      Alkaline " Phosphatase 77 U/L      Total Bilirubin 1.0 mg/dL      eGFR  African Amer 114 mL/min/1.73      Globulin 3.6 gm/dL      A/G Ratio 1.0 g/dL      BUN/Creatinine Ratio 8.3     Anion Gap 11.0 mmol/L     Narrative:      GFR Normal >60  Chronic Kidney Disease <60  Kidney Failure <15      Lipase [[id number]]  (Abnormal) Collected: 07/03/21 2230    Specimen: Blood Updated: 07/03/21 2306     Lipase 11 U/L     CBC & Differential [[id number]]  (Abnormal) Collected: 07/03/21 2230    Specimen: Blood Updated: 07/03/21 2301    Narrative:      The following orders were created for panel order CBC & Differential.  Procedure                               Abnormality         Status                     ---------                               -----------         ------                     Scan Slide[[id number]]                                                                  CBC Auto Differential[[id number]]        Abnormal            Final result                 Please view results for these tests on the individual orders.    CBC Auto Differential [[id number]]  (Abnormal) Collected: 07/03/21 2230    Specimen: Blood Updated: 07/03/21 2301     WBC 14.40 10*3/mm3      RBC 3.98 10*6/mm3      Hemoglobin 12.2 g/dL      Hematocrit 37.2 %      MCV 93.5 fL      MCH 30.7 pg      MCHC 32.8 g/dL      RDW 12.4 %      RDW-SD 43.0 fl      MPV 11.2 fL      Platelets 175 10*3/mm3      Neutrophil % 89.7 %      Lymphocyte % 3.3 %      Monocyte % 4.9 %      Eosinophil % 1.3 %      Basophil % 0.2 %      Immature Grans % 0.6 %      Neutrophils, Absolute 12.91 10*3/mm3      Lymphocytes, Absolute 0.48 10*3/mm3      Monocytes, Absolute 0.71 10*3/mm3      Eosinophils, Absolute 0.18 10*3/mm3      Basophils, Absolute 0.03 10*3/mm3      Immature Grans, Absolute 0.09 10*3/mm3      nRBC 0.0 /100 WBC           Imaging Results (Last 48 Hours)     Procedure Component Value Units Date/Time    CT Guided Biopsy Aspiration Injection [[id number]] Collected: 07/05/21 1220      Updated: 07/05/21 1230    Narrative:      EXAMINATION: CT GUIDED ASPIRATION-perirectal collection.     INDICATION: History of hysterectomy approximately one month prior to  presentation with recent fevers and abdominal pain found to have  multifocal fluid collections within the lower abdomen and pelvis  suspicious for abscesses. Presents to interventional radiology for  CT-guided transgluteal perirectal drain placement.     TECHNIQUE: After informed written consent, the patient was placed prone  on the CT table.  CT revealed an approximately 5.5 x 2.8 cm  collection in the pouch of Drew. This was amenable to drainage. The  patient was prepped and draped in the normal sterile fashion. 1%  lidocaine was infiltrated for local anesthesia. Under CT fluoroscopy  scopic guidance, an 18-gauge Chiba needle was advanced into the  collection. Discussion was found to be quite mobile and the needle was  unable to be advanced into the lesion proper. After 2 attempts, I  decided to abort the procedure. The patient tolerated the procedure  well.     The radiation dose reduction device was turned on for each scan per the  ALARA (As Low as Reasonably Achievable) protocol.     COMPARISON: CT abdomen and pelvis from 7/3/2021     FINDINGS: Mobile perirectal collection possibly representing an early  abscess. Unable to be punctured due to mobility. Procedure was aborted.          Impression:      Mobile perirectal collection similar to that seen on prior  imaging. Due to mobility, procedure was aborted. Recommend follow-up  imaging in 3 days to evaluate for possible reattempt.        This report was finalized on 7/5/2021 12:27 PM by Catalina Bowman.           Operative/Procedure Notes (last 48 hours) (Notes from 07/04/21 1221 through 07/06/21 1221)    No notes of this type exist for this encounter.          Physician Progress Notes (last 48 hours) (Notes from 07/04/21 1221 through 07/06/21 1221)      Amee Robertson MD at  21 0840           Quiana Sosa  : 1990  MRN: [id number]  CSN: [id number]    Hospital Day # 3   POD#40 s/p LAVH/BS at outside hospital   CC: hospital follow up of post op pelvic abscesses  Subjective   She reports pain is a little improved but still taking IV dilaudid q2 hours. Passing flatus but no BM. No emesis. Tolerating small amount of diet. Reports rectal pressure has decreased some. No fevers overnight.     Objective     Min/max vitals past 24 hours:   Temp  Min: 97.9 °F (36.6 °C)  Max: 100.9 °F (38.3 °C)  BP  Min: 96/62  Max: 110/71  Pulse  Min: 78  Max: 88  Resp  Min: 18  Max: 18        I/O last 3 completed shifts:  In: 3247 [P.O.:1161; I.V.:1536; IV Piggyback:550]  Out: 3700 [Urine:3700]    General: well developed; well nourished  no acute distress   Abdomen: Soft, mildly TTP in lower quadrants  +BS throughout   no umbilical or inguinal hernias are present  no hepato-splenomegaly  incision is clean, dry, intact and without drainage   Pelvic: Not performed   Ext: Calves NT     CBC:   Lab Results   Component Value Date     2021    HGB 10.1 (L) 2021    HCT 31.1 (L) 2021    WBC 10.84 (H) 2021     CBC w/ diff:   Lab Results   Component Value Date     2021    HGB 10.1 (L) 2021    HCT 31.1 (L) 2021    MCV 94.8 2021    RDW 12.6 2021    WBC 10.84 (H) 2021    NEUTRORELPCT 89.7 (H) 2021    AUTOIGPER 0.6 (H) 2021    LYMPHORELPCT 3.3 (L) 2021    MONORELPCT 4.9 (L) 2021    EOSRELPCT 1.3 2021    BASORELPCT 0.2 2021     CMP:   Lab Results   Component Value Date     2021    K 2.6 (C) 2021    CL 98 2021    CO2 31.0 (H) 2021    BUN 2 (L) 2021    CREATININE 0.62 2021    GLUCOSE 98 2021    ALBUMIN 2.70 (L) 2021    CALCIUM 8.7 2021    AST 13 2021    ALT 6 2021    BILITOT 0.4 2021         Assessment   1. POD#40  s/p LAVH/BS for AUB-L, pelvic pain per patient.   2. Post operative pelvic abscesses - unable to be drained by IR yesterday     Plan   1. Continue IV antibiotics. If patient were to fever again would consult gyn oncology especially if IR still unable to drain 5cm abscess  2. Attempt to transition to PO narcotics.   3. Agree with bowel regimen   4. Discussed plan of care with patient and all questions were answered to the best of my ability.     Amee Robertson MD  2021  08:40 EDT              Electronically signed by Amee Robertson MD at 21 0843     Amee Robertson MD at 21 1117           Quiana Sosa  : 1990  MRN: [id number]  CSN: [id number]    Hospital Day # 2   POD#39 s/p LAVH/BS at outside hospital   CC: hospital follow up of post op pelvic abscesses  Subjective   She is getting ready to go down for CT guided drainage of abscess. Reports pain is a little better and has less rectal pressure with voiding. Reports episode of emesis this morning after eating breakfast. Currently not nauseated. Reports overall feeling a little better but still has lower abdominal pain that moves to the left some. She had two documented fevers overnight around 102F.    Objective     Min/max vitals past 24 hours:   Temp  Min: 98.7 °F (37.1 °C)  Max: 102.5 °F (39.2 °C)  BP  Min: 105/69  Max: 118/75  Pulse  Min: 86  Max: 99  Resp  Min: 18  Max: 18        I/O last 3 completed shifts:  In: 3628 [P.O.:1930; I.V.:198; IV Piggyback:1500]  Out: 1750 [Urine:1750]    General: well developed; well nourished  no acute distress   Abdomen: Soft,  Mildly TTP in lower quadrants, no rebound or guarding   no umbilical or inguinal hernias are present  no hepato-splenomegaly  incision is clean, dry, intact and without drainage  Bowel sounds present throughout    Pelvic: Not performed   Ext: Calves NT     CBC w/ diff:   Lab Results   Component Value Date     2021    HGB 11.0 (L) 2021     HCT 33.5 (L) 07/05/2021    MCV 93.1 07/05/2021    RDW 12.7 07/05/2021    WBC 10.08 07/05/2021    NEUTRORELPCT 89.7 (H) 07/03/2021    AUTOIGPER 0.6 (H) 07/03/2021    LYMPHORELPCT 3.3 (L) 07/03/2021    MONORELPCT 4.9 (L) 07/03/2021    EOSRELPCT 1.3 07/03/2021    BASORELPCT 0.2 07/03/2021     CMP:   Lab Results   Component Value Date     07/03/2021    K 3.5 07/03/2021     07/03/2021    CO2 28.0 07/03/2021    BUN 6 07/03/2021    CREATININE 0.72 07/03/2021    GLUCOSE 99 07/03/2021    ALBUMIN 3.60 07/03/2021    CALCIUM 9.6 07/03/2021    AST 13 07/03/2021    ALT 9 07/03/2021    BILITOT 1.0 07/03/2021     Lactate:   Lab Results   Component Value Date    LACTATE 0.8 07/03/2021     UA:    Lab Results   Component Value Date    SQUAMEPIUA 3-6 (A) 07/04/2021    SPECGRAVUR 1.026 07/04/2021    KETONESU Negative 07/04/2021    BLOODU Negative 07/04/2021    LEUKOCYTESUR Negative 07/04/2021    NITRITEU Negative 07/04/2021    RBCUA 3-6 (A) 07/04/2021    WBCUA 3-5 (A) 07/04/2021    BACTERIA None Seen 07/04/2021     Urine Culture: No results found for: URINECX    CT AP from 7/3/21 IMPRESSION:  1.  Postop hysterectomy with extensive peritoneal inflammation throughout the pelvis. There are multiple small peripherally enhancing interloop fluid collections scattered within the low pelvis concerning for interloop abscesses. Most of these measure  only 1 to 2 cm. Largest low midline prerectal collection measures up to 5.7 cm.  2.  Soft tissue thickening of the vaginal cuff but no obvious evidence of cuff dehiscence. There is no abscess or free air at the cuff. No air within the bladder.  3.  No evidence of upper urinary tract obstruction.    Assessment   3. POD#39 s/p LAVH/BS for AUB-L, pelvic pain per patient.   4. Post operative pelvic abscesses   5. Post operative ileus     Plan   5. Agree with IR drainage of large abscess  6. Continue IV vanc/zosyn and labs daily   7. Agree with consulting ID  8. Slowly advance diet   9. Plan  of care discussed with patient and her mother over the phone.     Amee Robertson MD  2021  11:18 EDT              Electronically signed by Amee Robertson MD at 21 1123     Walter Prieto MD at 21 0736              [facility] Medicine Services  PROGRESS NOTE    Patient Name: Anisa Sosa  : 1990  MRN: [id number]    Date of Admission: 7/3/2021  Primary Care Physician: Provider, No Known    Subjective   Subjective     CC:  Abdominal pain    HPI: had fevers and sweats throughout the night.       ROS:  Gen- + fevers  CV- No chest pain, palpitations  Resp- No cough, dyspnea  GI- + abd discomfort          Objective   Objective     Vital Signs:   Temp:  [98.7 °F (37.1 °C)-102.5 °F (39.2 °C)] 99.6 °F (37.6 °C)  Heart Rate:  [86-99] 86  Resp:  [18] 18  BP: (105-118)/(67-75) 105/69        Physical Exam:  Constitutional - no acute distress, nontoxic, in bed  HEENT-NCAT, mucous membranes moist  CV-RRR, S1 S2 normal, no m/r/g  Resp-CTAB, no wheezes, rhonchi or rales  Abd-soft, mild generalized tenderness, nondistended, normoactive bowel sounds  Ext-No lower extremity cyanosis, clubbing or edema bilaterally  Neuro-alert and oriented, speech clear, moves all extremities   Psych-normal affect   Skin- No rash on exposed UE or LE bilaterally    Results Reviewed:  Results from last 7 days   Lab Units 21  0518 21  2230   WBC 10*3/mm3  --  14.40*   HEMOGLOBIN g/dL  --  12.2   HEMATOCRIT %  --  37.2   PLATELETS 10*3/mm3  --  175   INR  1.22*  --    PROCALCITONIN ng/mL  --  0.30*     Results from last 7 days   Lab Units 21  2230   SODIUM mmol/L 139   POTASSIUM mmol/L 3.5   CHLORIDE mmol/L 100   CO2 mmol/L 28.0   BUN mg/dL 6   CREATININE mg/dL 0.72   GLUCOSE mg/dL 99   CALCIUM mg/dL 9.6   ALT (SGPT) U/L 9   AST (SGOT) U/L 13     Estimated Creatinine Clearance: 133.9 mL/min (by C-G formula based on SCr of 0.72 mg/dL).    Microbiology Results Abnormal     Procedure  Component Value - Date/Time    Blood Culture - Blood, Arm, Left [[id number]] Collected: 07/04/21 0017    Lab Status: Preliminary result Specimen: Blood from Arm, Left Updated: 07/05/21 0445     Blood Culture No growth at 24 hours    Blood Culture - Blood, Arm, Right [[id number]] Collected: 07/04/21 0017    Lab Status: Preliminary result Specimen: Blood from Arm, Right Updated: 07/05/21 0445     Blood Culture No growth at 24 hours    COVID PRE-OP / PRE-PROCEDURE SCREENING ORDER (NO ISOLATION) - Swab, Nasopharynx [[id number]]  (Normal) Collected: 07/04/21 0302    Lab Status: Final result Specimen: Swab from Nasopharynx Updated: 07/04/21 0408    Narrative:      The following orders were created for panel order COVID PRE-OP / PRE-PROCEDURE SCREENING ORDER (NO ISOLATION) - Swab, Nasopharynx.  Procedure                               Abnormality         Status                     ---------                               -----------         ------                     COVID-19,CEPHEID,JORGE IN-...[[id number]]  Normal              Final result                 Please view results for these tests on the individual orders.    COVID-19,CEPHEID,JORGE IN-HOUSE(OR EMERGENT/ADD-ON),NP SWAB IN TRANSPORT MEDIA 3-4 HR TAT - Swab, Nasopharynx [[id number]]  (Normal) Collected: 07/04/21 0302    Lab Status: Final result Specimen: Swab from Nasopharynx Updated: 07/04/21 0408     COVID19 Not Detected    Narrative:      Fact sheet for providers: https://www.fda.gov/media/850861/download     Fact sheet for patients: https://www.fda.gov/media/734512/download  Fact sheet for providers: https://www.fda.gov/media/938256/download     Fact sheet for patients: https://www.fda.gov/media/718347/download          Imaging Results (Last 24 Hours)     ** No results found for the last 24 hours. **              I have reviewed the medications:  Scheduled Meds:Pharmacy Consult, , Does not apply, Once  piperacillin-tazobactam, 4.5 g, Intravenous, Q8H  sodium chloride, 10  mL, Intravenous, Q12H  vancomycin, 1,250 mg, Intravenous, Q12H      Continuous Infusions:Pharmacy to dose vancomycin,   sodium chloride, 100 mL/hr, Last Rate: 100 mL/hr (07/04/21 1107)      PRN Meds:.acetaminophen **OR** acetaminophen **OR** acetaminophen  •  HYDROmorphone  •  ondansetron **OR** ondansetron  •  Pharmacy to dose vancomycin  •  phenol  •  Sodium Chloride (PF)  •  sodium chloride    Assessment/Plan   Assessment & Plan     Active Hospital Problems    Diagnosis  POA   • Intra-abdominal abscess post-procedure [T81.43XA]  Yes      Resolved Hospital Problems   No resolved problems to display.        Brief Hospital Course to date:  Anisa Sosa is a 31 y.o. female with history of elective laparoscopic hysterectomy in [place] on May 27.  She recently reports increased abdominal pain and anorexia as well as no fevers.  CT abdomen pelvis here shows extensive peritoneal inflammation throughout the pelvis with multiple small peripherally enhancing interloop fluid collections scattered in the low pelvis with the largest measuring 5.7 cm in the perirectal area.    Fever  Leukocytosis  Intra-abdominal abscesses  - ongoing fevers with Tmax 102.5  - Leukocytosis resolved, but CRP 31  -Continue vancomycin and Zosyn  -Abscess drainage today if possible, both for therapeutic and diagnostic purposes, will discuss with IR  -Pain control  -ID consultation  -reviewed treatment plan with Gyn Dr Robertson      DVT prophylaxis:  Mechanical DVT prophylaxis orders are present.       Disposition: I expect the patient to be discharged home tbd    CODE STATUS:   Code Status and Medical Interventions:   Ordered at: 07/04/21 5946     Level Of Support Discussed With:    Patient     Code Status:    CPR     Medical Interventions (Level of Support Prior to Arrest):    Full       Walter Prieto MD  07/05/21                Electronically signed by Walter Prieto MD at 07/05/21 8842     Walter Prieto MD at 07/04/21 1762               [facility] Medicine Services  PROGRESS NOTE    Patient Name: Anisa Sosa  : 1990  MRN: [id number]    Date of Admission: 7/3/2021  Primary Care Physician: Provider, No Known    Subjective   Subjective     CC:  Abdominal pain    HPI:  Abdominal pain improved today compared to last night, but still experiencing fairly significant discomfort in the lower quadrants.  Trying to eat a little bit, no nausea, but not hungry.    ROS:  Gen- No fevers, chills  CV- No chest pain, palpitations  Resp- No cough, dyspnea  GI- + abd pain        Objective   Objective     Vital Signs:   Temp:  [98.4 °F (36.9 °C)-100.5 °F (38.1 °C)] 99 °F (37.2 °C)  Heart Rate:  [] 97  Resp:  [17-20] 17  BP: (114-130)/(72-91) 114/72        Physical Exam:  Constitutional - no acute distress, nontoxic, in bed  HEENT-NCAT, mucous membranes moist  CV-RRR, S1 S2 normal, no m/r/g  Resp-CTAB, no wheezes, rhonchi or rales  Abd-soft, mild generalized tenderness, nondistended, normoactive bowel sounds  Ext-No lower extremity cyanosis, clubbing or edema bilaterally  Neuro-alert and oriented, speech clear, moves all extremities   Psych-normal affect   Skin- No rash on exposed UE or LE bilaterally    Results Reviewed:  Results from last 7 days   Lab Units 21  0518 21  2230   WBC 10*3/mm3  --  14.40*   HEMOGLOBIN g/dL  --  12.2   HEMATOCRIT %  --  37.2   PLATELETS 10*3/mm3  --  175   INR  1.22*  --    PROCALCITONIN ng/mL  --  0.30*     Results from last 7 days   Lab Units 21  2230   SODIUM mmol/L 139   POTASSIUM mmol/L 3.5   CHLORIDE mmol/L 100   CO2 mmol/L 28.0   BUN mg/dL 6   CREATININE mg/dL 0.72   GLUCOSE mg/dL 99   CALCIUM mg/dL 9.6   ALT (SGPT) U/L 9   AST (SGOT) U/L 13     Estimated Creatinine Clearance: 134.1 mL/min (by C-G formula based on SCr of 0.72 mg/dL).    Microbiology Results Abnormal     Procedure Component Value - Date/Time    COVID PRE-OP / PRE-PROCEDURE SCREENING ORDER (NO  ISOLATION) - Swab, Nasopharynx [[id number]]  (Normal) Collected: 07/04/21 0302    Lab Status: Final result Specimen: Swab from Nasopharynx Updated: 07/04/21 0408    Narrative:      The following orders were created for panel order COVID PRE-OP / PRE-PROCEDURE SCREENING ORDER (NO ISOLATION) - Swab, Nasopharynx.  Procedure                               Abnormality         Status                     ---------                               -----------         ------                     COVID-19,CEPHEID,JORGE IN-...[[id number]]  Normal              Final result                 Please view results for these tests on the individual orders.    COVID-19,CEPHEID,JORGE IN-HOUSE(OR EMERGENT/ADD-ON),NP SWAB IN TRANSPORT MEDIA 3-4 HR TAT - Swab, Nasopharynx [[id number]]  (Normal) Collected: 07/04/21 0302    Lab Status: Final result Specimen: Swab from Nasopharynx Updated: 07/04/21 0408     COVID19 Not Detected    Narrative:      Fact sheet for providers: https://www.fda.gov/media/779887/download     Fact sheet for patients: https://www.fda.gov/media/059385/download  Fact sheet for providers: https://www.fda.gov/media/703684/download     Fact sheet for patients: https://www.fda.gov/media/964337/download          Imaging Results (Last 24 Hours)     Procedure Component Value Units Date/Time    CT Abdomen Pelvis With Contrast [[id number]] Collected: 07/03/21 2352     Updated: 07/03/21 2354    Narrative:      CT ABDOMEN AND PELVIS WITH CONTRAST, 7/3/2021    HISTORY:  31-year-old female one month postop hysterectomy presenting to the ED complaining of low abdomen pain and decreased bowel movements.    TECHNIQUE:  CT imaging of the abdomen and pelvis with IV contrast. Radiation dose reduction techniques included automated exposure control. Radiation audit for CT and nuclear cardiology exams in the last 12 months: 0.    PELVIS FINDINGS:  Postoperative changes of hysterectomy. Edema and soft tissue stranding is seen throughout the pelvic  mesentery, and there are multiple small, scattered rim-enhancing interloop fluid collections within the low pelvis suspicious for multifocal abscess. The  largest collection in the low prerectal midline measures about 5.7 cm (image 73). There is soft tissue thickening of the vaginal cuff, but there is no abscess or air collection in this location. There is no air within the urinary bladder. Rectum is  unremarkable. 3.1 cm left ovary cyst.    ABDOMEN FINDINGS:  Small bowel and colon are normal in caliber with no evidence of bowel obstruction or significant adynamic ileus. No fluid collections are seen within the abdominal peritoneal or retroperitoneal cavities.    Both kidneys are normal in appearance with no evidence of upper urinary tract obstruction on the right or left. Normal renal parenchymal enhancement.    No gallbladder distention or bile duct dilatation. Liver, pancreas and spleen appear normal.    Lung base images show no active disease.      Impression:      1.  Postop hysterectomy with extensive peritoneal inflammation throughout the pelvis. There are multiple small peripherally enhancing interloop fluid collections scattered within the low pelvis concerning for interloop abscesses. Most of these measure  only 1 to 2 cm. Largest low midline prerectal collection measures up to 5.7 cm.  2.  Soft tissue thickening of the vaginal cuff but no obvious evidence of cuff dehiscence. There is no abscess or free air at the cuff. No air within the bladder.  3.  No evidence of upper urinary tract obstruction.    Signer Name: William Cox MD   Signed: 7/3/2021 11:52 PM   Workstation Name: [email or web address]    [facility]              I have reviewed the medications:  Scheduled Meds:[START ON 7/5/2021] Pharmacy Consult, , Does not apply, Once  piperacillin-tazobactam, 4.5 g, Intravenous, Q8H  sodium chloride, 10 mL, Intravenous, Q12H  vancomycin, 1,250 mg, Intravenous, Q12H      Continuous  Infusions:Pharmacy to dose vancomycin,   sodium chloride, 100 mL/hr, Last Rate: 100 mL/hr (07/04/21 1107)      PRN Meds:.acetaminophen **OR** acetaminophen **OR** acetaminophen  •  HYDROmorphone  •  ondansetron **OR** ondansetron  •  Pharmacy to dose vancomycin  •  Sodium Chloride (PF)  •  sodium chloride    Assessment/Plan   Assessment & Plan     Active Hospital Problems    Diagnosis  POA   • Intra-abdominal abscess post-procedure [T81.43XA]  Yes      Resolved Hospital Problems   No resolved problems to display.        Brief Hospital Course to date:  Anisa Sosa is a 31 y.o. female with history of elective laparoscopic hysterectomy in [place] on May 27.  She recently reports increased abdominal pain and anorexia as well as no fevers.  CT abdomen pelvis here shows extensive peritoneal inflammation throughout the pelvis with multiple small peripherally enhancing interloop fluid collections scattered in the low pelvis with the largest measuring 5.7 cm in the perirectal area.    Fever  Leukocytosis  Intra-abdominal abscesses  -Continue vancomycin and Zosyn  -IR guided abscess drainage tomorrow if possible, both for therapeutic and diagnostic purposes  -ID consultation in a.m.  -Pain control  -CBC a.m.  -Discussed treatment plan with gynecology Dr. Robertson      DVT prophylaxis:  Mechanical DVT prophylaxis orders are present.       Disposition: I expect the patient to be discharged home tbd    CODE STATUS:   Code Status and Medical Interventions:   Ordered at: 07/04/21 0418     Level Of Support Discussed With:    Patient     Code Status:    CPR     Medical Interventions (Level of Support Prior to Arrest):    Full       Walter Prieto MD  07/04/21                Electronically signed by Walter Prieto MD at 07/04/21 1400          Consult Notes (last 48 hours) (Notes from 07/04/21 1221 through 07/06/21 1221)      Titus Frias MD at 07/05/21 1415      Consult Orders    1. Inpatient Infectious Diseases  Consult [[id number]] ordered by Walter Prieto MD at 07/05/21 0721               INFECTIOUS DISEASE CONSULT/INITIAL HOSPITAL VISIT    Anisa Sosa  1990  [id number]    Date of Consult: 7/5/2021    Admission Date: 7/3/2021      Requesting Provider: Walter Prieto MD  Evaluating Physician: Titus Frias MD    Reason for Consultation: intraabdominal abscesses after hysterectomy    History of present illness:    Patient is a 31 y.o. female with h/o STIs and abnormal uterine bleeding/uterine fibroids s/p lap vaginal-assisted hysterectomy in [place] on 5/27/21 (with ovaries retained) who followed up a week later and was placed on 5 days of Cipro for possible UTI. She did relatively well post-operatively with mild tenderness.  Last Sunday, she went to a tournament in [place] to watch her daughter and started feeling fatigued.  She slept for a couple of days, but on Tuesday, she began having severe abdominal pain in lower part of abdomen.  She has some dizziness and lightheadedness with the pain.  She went to [facility] ED on 6/30 with a CT scan at that time showing some inflammation of the pelvic area.  She was sent home on ibuprofen, pain meds, and Cipro.  She continued to have pain and pelvic pressure with no relief from medications or heating pad.  She had some difficulty breathing because of the bloating and pressure.  She had not had a BM since 6/29. She started having nausea and vomiting on 7/1.  On 7/2, she had worsening pain and came to [facility] ED on 7/3.  A CT scan here showed interloop abscesses 1 to 2 cm in sizes and a 5.7 cm abscess near the rectum.  A CT-guided aspiration of prerectal abscess was attempted today but aborted d/t the fluid collection being mobile.  Her Tmax since arrival has been 102.5.  Pertinent labs are PCT 0.3, lactic acid 0.8, WBC 14,400 with 90% neutrophils, creatinine 0.72, CRP 31.7, and UA WBC 3-5. Blood cultures are negative to date. A COVID-19 PCR was  negative. She is currently on Vancomycin and Zosyn.  ID was asked to evaluate and manage her antibiotic therapy.     Past Medical History:   Diagnosis Date   • Anemia    • History of chlamydia    • History of trichomonal vaginitis        Past Surgical History:   Procedure Laterality Date   •  SECTION     •  SECTION     •  SECTION WITH TUBAL  2013   • LAPAROSCOPIC ASSISTED VAGINAL HYSTERECTOMY  2021    for AUB-L, pelvic pain. Ovaries retained per patient. Dr. Avani Horta.        Family History   Problem Relation Age of Onset   • Breast cancer Maternal Grandmother    • Ovarian cancer Neg Hx    • Colon cancer Neg Hx        Social History     Socioeconomic History   • Marital status: Single     Spouse name: Not on file   • Number of children: Not on file   • Years of education: Not on file   • Highest education level: Not on file   Tobacco Use   • Smoking status: Former Smoker   • Smokeless tobacco: Never Used   • Tobacco comment: off and on for 3 years    Substance and Sexual Activity   • Alcohol use: Yes     Comment: occ   • Drug use: Never       No Known Allergies      Medication:    Current Facility-Administered Medications:   •  ! Vancomycin trough level before 12:00 Noon dose on 21; hold dose until level checked by a pharmacist, , Does not apply, Once, Good Ramirez, [facility]  •  [START ON 2021] ! Vancomycin trough level prior to 0600 dose  . Please do not administer dose until level is evaluated for potential dose adjustments, , Does not apply, Once, Neri Prater, [facility]  •  acetaminophen (TYLENOL) tablet 650 mg, 650 mg, Oral, Q4H PRN, 650 mg at 21 0816 **OR** acetaminophen (TYLENOL) 160 MG/5ML solution 650 mg, 650 mg, Oral, Q4H PRN **OR** acetaminophen (TYLENOL) suppository 650 mg, 650 mg, Rectal, Q4H PRN, Tierney Martinez MD  •  HYDROmorphone (DILAUDID) injection 0.5 mg, 0.5 mg, Intravenous, Q2H PRN, Tierney Martinez MD, 0.5 mg at 21  1349  •  ondansetron (ZOFRAN) tablet 4 mg, 4 mg, Oral, Q6H PRN **OR** ondansetron (ZOFRAN) injection 4 mg, 4 mg, Intravenous, Q6H PRN, Tierney Martinez MD, 4 mg at 07/05/21 1030  •  Pharmacy to dose vancomycin, , Does not apply, Continuous PRN, Tierney Martinez MD  •  phenol (CHLORASEPTIC) 1.4 % liquid 2 spray, 2 spray, Mouth/Throat, Q2H PRN, Tres Brannona, APRN  •  piperacillin-tazobactam (ZOSYN) 4.5 g in iso-osmotic dextrose 100 mL IVPB (premix), 4.5 g, Intravenous, Q8H, Tierney Martinez MD, 4.5 g at 07/05/21 1221  •  Sodium Chloride (PF) 0.9 % 10 mL, 10 mL, Intravenous, PRN, Tierney Martinez MD  •  sodium chloride 0.9 % flush 1-10 mL, 1-10 mL, Intravenous, PRN, Tierney Martinez MD, 10 mL at 07/04/21 1638  •  sodium chloride 0.9 % flush 10 mL, 10 mL, Intravenous, Q12H, Tierney Martinez MD, 10 mL at 07/05/21 0902  •  sodium chloride 0.9 % infusion, 100 mL/hr, Intravenous, Continuous, Walter Prieto MD, Last Rate: 100 mL/hr at 07/05/21 1221, 100 mL/hr at 07/05/21 1221  •  vancomycin (VANCOCIN) in iso-osmotic dextrose IVPB 1 g (premix) 200 mL, 1,000 mg, Intravenous, Q8H, Neri Prater RPH, 1,000 mg at 07/05/21 1349    Antibiotics:  Anti-Infectives (From admission, onward)    Ordered     Dose/Rate Route Frequency Start Stop    07/05/21 1329  vancomycin (VANCOCIN) in iso-osmotic dextrose IVPB 1 g (premix) 200 mL     Ordering Provider: Neri Prater RPH    1,000 mg  over 90 Minutes Intravenous Every 8 Hours Scheduled 07/05/21 1415 07/11/21 1359    07/04/21 0423  piperacillin-tazobactam (ZOSYN) 4.5 g in iso-osmotic dextrose 100 mL IVPB (premix)     Ordering Provider: Tierney Martinez MD    4.5 g  over 4 Hours Intravenous Every 8 Hours 07/04/21 1200 07/09/21 1159    07/04/21 0423  piperacillin-tazobactam (ZOSYN) 4.5 g in iso-osmotic dextrose 100 mL IVPB (premix)     Ordering Provider: Tierney Martinez MD    4.5 g  over 30 Minutes Intravenous Once 07/04/21 0600 07/04/21 0528    07/04/21 0423  Pharmacy to  dose vancomycin     Ordering Provider: Tierney Martinez MD     Does not apply Continuous PRN 21 04221 0420    21 2355  vancomycin 1750 mg/500 mL 0.9% NS IVPB ([facility])     Ordering Provider: Lito Machuca DO    20 mg/kg × 83.9 kg  over 120 Minutes Intravenous Once 21 2357 21 0250    21 2355  piperacillin-tazobactam (ZOSYN) 3.375 g in iso-osmotic dextrose 50 ml (premix)     Ordering Provider: Lito Machuca DO    3.375 g  over 30 Minutes Intravenous Once 21 23521 0140            Review of Systems:  Constitutional-- + Fever, chills, sweats.  Appetite poor, and no malaise. + fatigue.  HEENT-- No new vision, hearing or throat complaints.  No epistaxis or oral sores.  Denies odynophagia or dysphagia. No headache, photophobia or neck stiffness.  CV-- No chest pain, palpitation or syncope  Resp-- + SOB/no cough/Hemoptysis  GI- + nausea, + vomiting, no diarrhea.  No hematochezia, melena, or hematemesis. Denies jaundice or chronic liver disease.  -- No dysuria, hematuria, or flank pain.  Denies hesitancy, urgency or burning with urination.  Just pressure.  Lymph- no swollen lymph nodes in neck/axilla or groin.   Heme- No active bruising or bleeding; no Hx of DVT or PE.  MS-- no swelling or pain in the bones or joints of arms/legs.  No new back pain.  Neuro-- No acute focal weakness or numbness in the arms or legs.  No seizures.  Skin--No rashes or lesions      Physical Exam:   Vital Signs  Temp (24hrs), Av.8 °F (38.2 °C), Min:98.7 °F (37.1 °C), Max:102.5 °F (39.2 °C)    Temp  Min: 98.7 °F (37.1 °C)  Max: 102.5 °F (39.2 °C)  BP  Min: 105/69  Max: 118/75  Pulse  Min: 86  Max: 99  Resp  Min: 18  Max: 18  SpO2  Min: 93 %  Max: 96 %    GENERAL: Awake and alert, in no acute distress.   HEENT: Normocephalic, atraumatic.  PERRL. EOMI. No conjunctival injection. No icterus. Oropharynx clear without evidence of thrush or exudate..    NECK: Supple without nuchal  rigidity. No mass.  HEART: RRR; No murmur, rubs, gallops.   LUNGS: Clear to auscultation bilaterally without wheezing, rales, rhonchi. Normal respiratory effort. Nonlabored.  ABDOMEN: Soft, lower abdominal tenderness, mildly distended. Laparoscopic incision sites are healing well. Quiet bowel sounds. No rebound or guarding. NO mass or HSM.  EXT:  No cyanosis, clubbing or edema. No cord.  :  Without Menon catheter.   MSK:  FROM, no joint effusions  SKIN: Warm and dry without cutaneous eruptions on Inspection/palpation.    NEURO: Oriented to PPT. Normal speech and cognition.  PSYCHIATRIC: Normal insight and judgment. Cooperative with PE    Laboratory Data    Results from last 7 days   Lab Units 07/05/21  0756 07/03/21  2230   WBC 10*3/mm3 10.08 14.40*   HEMOGLOBIN g/dL 11.0* 12.2   HEMATOCRIT % 33.5* 37.2   PLATELETS 10*3/mm3 170 175     Results from last 7 days   Lab Units 07/03/21  2230   SODIUM mmol/L 139   POTASSIUM mmol/L 3.5   CHLORIDE mmol/L 100   CO2 mmol/L 28.0   BUN mg/dL 6   CREATININE mg/dL 0.72   GLUCOSE mg/dL 99   CALCIUM mg/dL 9.6     Results from last 7 days   Lab Units 07/03/21  2230   ALK PHOS U/L 77   BILIRUBIN mg/dL 1.0   ALT (SGPT) U/L 9   AST (SGOT) U/L 13         Results from last 7 days   Lab Units 07/05/21  0756   CRP mg/dL 31.69*     Results from last 7 days   Lab Units 07/03/21  2230   LACTATE mmol/L 0.8         Results from last 7 days   Lab Units 07/05/21  1230   VANCOMYCIN TR mcg/mL <4.00*     Estimated Creatinine Clearance: 133.9 mL/min (by C-G formula based on SCr of 0.72 mg/dL).      Microbiology:  Microbiology Results (last 10 days)     Procedure Component Value - Date/Time    COVID PRE-OP / PRE-PROCEDURE SCREENING ORDER (NO ISOLATION) - Swab, Nasopharynx [[id number]]  (Normal) Collected: 07/04/21 0302    Lab Status: Final result Specimen: Swab from Nasopharynx Updated: 07/04/21 0408    Narrative:      The following orders were created for panel order COVID PRE-OP / PRE-PROCEDURE  SCREENING ORDER (NO ISOLATION) - Swab, Nasopharynx.  Procedure                               Abnormality         Status                     ---------                               -----------         ------                     COVID-19,CEPHEID,JORGE IN-...[[id number]]  Normal              Final result                 Please view results for these tests on the individual orders.    COVID-19,CEPHEID,JORGE IN-HOUSE(OR EMERGENT/ADD-ON),NP SWAB IN TRANSPORT MEDIA 3-4 HR TAT - Swab, Nasopharynx [[id number]]  (Normal) Collected: 07/04/21 0302    Lab Status: Final result Specimen: Swab from Nasopharynx Updated: 07/04/21 0408     COVID19 Not Detected    Narrative:      Fact sheet for providers: https://www.fda.gov/media/356438/download     Fact sheet for patients: https://www.fda.gov/media/642919/download  Fact sheet for providers: https://www.fda.gov/media/010822/download     Fact sheet for patients: https://www.fda.gov/media/474847/download    Blood Culture - Blood, Arm, Left [[id number]] Collected: 07/04/21 0017    Lab Status: Preliminary result Specimen: Blood from Arm, Left Updated: 07/05/21 0445     Blood Culture No growth at 24 hours    Blood Culture - Blood, Arm, Right [[id number]] Collected: 07/04/21 0017    Lab Status: Preliminary result Specimen: Blood from Arm, Right Updated: 07/05/21 0445     Blood Culture No growth at 24 hours              Radiology:  Imaging Results (Last 72 Hours)     Procedure Component Value Units Date/Time    CT Guided Biopsy Aspiration Injection [[id number]] Collected: 07/05/21 1220     Updated: 07/05/21 1230    Narrative:      EXAMINATION: CT GUIDED ASPIRATION-perirectal collection.     INDICATION: History of hysterectomy approximately one month prior to  presentation with recent fevers and abdominal pain found to have  multifocal fluid collections within the lower abdomen and pelvis  suspicious for abscesses. Presents to interventional radiology for  CT-guided transgluteal perirectal drain  placement.     TECHNIQUE: After informed written consent, the patient was placed prone  on the CT table.  CT revealed an approximately 5.5 x 2.8 cm  collection in the pouch of Drew. This was amenable to drainage. The  patient was prepped and draped in the normal sterile fashion. 1%  lidocaine was infiltrated for local anesthesia. Under CT fluoroscopy  scopic guidance, an 18-gauge Chiba needle was advanced into the  collection. Discussion was found to be quite mobile and the needle was  unable to be advanced into the lesion proper. After 2 attempts, I  decided to abort the procedure. The patient tolerated the procedure  well.     The radiation dose reduction device was turned on for each scan per the  ALARA (As Low as Reasonably Achievable) protocol.     COMPARISON: CT abdomen and pelvis from 7/3/2021     FINDINGS: Mobile perirectal collection possibly representing an early  abscess. Unable to be punctured due to mobility. Procedure was aborted.          Impression:      Mobile perirectal collection similar to that seen on prior  imaging. Due to mobility, procedure was aborted. Recommend follow-up  imaging in 3 days to evaluate for possible reattempt.        This report was finalized on 7/5/2021 12:27 PM by Catalina Bowman.       CT Abdomen Pelvis With Contrast [[id number]] Collected: 07/03/21 2352     Updated: 07/03/21 2354    Narrative:      CT ABDOMEN AND PELVIS WITH CONTRAST, 7/3/2021    HISTORY:  31-year-old female one month postop hysterectomy presenting to the ED complaining of low abdomen pain and decreased bowel movements.    TECHNIQUE:  CT imaging of the abdomen and pelvis with IV contrast. Radiation dose reduction techniques included automated exposure control. Radiation audit for CT and nuclear cardiology exams in the last 12 months: 0.    PELVIS FINDINGS:  Postoperative changes of hysterectomy. Edema and soft tissue stranding is seen throughout the pelvic mesentery, and there are multiple small,  scattered rim-enhancing interloop fluid collections within the low pelvis suspicious for multifocal abscess. The  largest collection in the low prerectal midline measures about 5.7 cm (image 73). There is soft tissue thickening of the vaginal cuff, but there is no abscess or air collection in this location. There is no air within the urinary bladder. Rectum is  unremarkable. 3.1 cm left ovary cyst.    ABDOMEN FINDINGS:  Small bowel and colon are normal in caliber with no evidence of bowel obstruction or significant adynamic ileus. No fluid collections are seen within the abdominal peritoneal or retroperitoneal cavities.    Both kidneys are normal in appearance with no evidence of upper urinary tract obstruction on the right or left. Normal renal parenchymal enhancement.    No gallbladder distention or bile duct dilatation. Liver, pancreas and spleen appear normal.    Lung base images show no active disease.      Impression:      1.  Postop hysterectomy with extensive peritoneal inflammation throughout the pelvis. There are multiple small peripherally enhancing interloop fluid collections scattered within the low pelvis concerning for interloop abscesses. Most of these measure  only 1 to 2 cm. Largest low midline prerectal collection measures up to 5.7 cm.  2.  Soft tissue thickening of the vaginal cuff but no obvious evidence of cuff dehiscence. There is no abscess or free air at the cuff. No air within the bladder.  3.  No evidence of upper urinary tract obstruction.    Signer Name: William Cox MD   Signed: 7/3/2021 11:52 PM   Workstation Name: [email or web address]    [facility] (Dr Frias) read her CT A/P and agree with the above report    Impression:   - Sepsis with fever, tachycardia, leukocytosis, and post-operative pelvic abscesses  - Multiple pelvic abscess after prior hysterectomy 5/27/21.  CT-guided aspiration of 5.7 cm abscess was aborted as it was not yet loculated and  drain placement was not possible.  Can reassess for drainage in 3 days.   - Leukocytosis/neutrophilia, improved  - Pelvic pain  - Fever, improving  - Abnormal uterine bleeding/fibroid tumors s/p hysterectomy (vaginal-assisted) with ovaries retained    PLAN/RECOMMENDATIONS:   Thank you for asking us to see Anisa Sosa, I recommend the following:  - Follow blood cultures.  If I and D or drain place, send for routine culture, fungal culture and follow.  Check MRSA PCR  - Continue Zosyn for empiric IA coverage  - D/c Vancomycin and change to Daptomycin 6 mg/kg IV daily to empirically cover MRSA and VRE.  Check CPK.  - If worsens, may consider antifungal coverage.  - Gynecology following.   - CT-guided aspiration/drain placement attempted but aborted as not loculated and recommend to repeat CT scan in 3 days if she is still doing poorly      Titus Frias MD saw and examined patient, verified hx and PE, read all radiographic studies, reviewed labs and micro data, and formulated dx, plan for treatment and all medical decision making.      ZOFIA ThackerC for MD Girish Sepulveda PA  7/5/2021  14:15 EDT                    Electronically signed by Titus Frias MD at 07/05/21 9464

## 2021-07-06 NOTE — CONSULTS
INFECTIOUS DISEASE CONSULT/INITIAL HOSPITAL VISIT    Anisa Sosa  1990  [id number]    Date of Consult: 7/5/21  Admission Date: 7/3/2021      Requesting Provider: Walter Prieto MD  Evaluating Physician: Titus Frias MD    Reason for Consultation: intraabdominal abscesses after hysterectomy    History of present illness:    Patient is a 31 y.o. female with h/o STIs and abnormal uterine bleeding/uterine fibroids s/p lap vaginal-assisted hysterectomy in [place] on 5/27/21 (with ovaries retained) who followed up a week later and was placed on 5 days of Cipro for possible UTI. She did relatively well post-operatively with mild tenderness.  Last Sunday, she went to a tournament in [place] to watch her daughter and started feeling fatigued.  She slept for a couple of days, but on Tuesday, she began having severe abdominal pain in lower part of abdomen.  She has some dizziness and lightheadedness with the pain.  She went to [facility] ED on 6/30 with a CT scan at that time showing some inflammation of the pelvic area.  She was sent home on ibuprofen, pain meds, and Cipro.  She continued to have pain and pelvic pressure with no relief from medications or heating pad.  She had some difficulty breathing because of the bloating and pressure.  She had not had a BM since 6/29. She started having nausea and vomiting on 7/1.  On 7/2, she had worsening pain and came to [facility] ED on 7/3.  A CT scan here showed interloop abscesses 1 to 2 cm in sizes and a 5.7 cm abscess near the rectum.  A CT-guided aspiration of prerectal abscess was attempted today but aborted d/t the fluid collection being mobile.  Her Tmax since arrival has been 102.5.  Pertinent labs are PCT 0.3, lactic acid 0.8, WBC 14,400 with 90% neutrophils, creatinine 0.72, CRP 31.7, and UA WBC 3-5. Blood cultures are negative to date. A COVID-19 PCR was negative. She is currently on Vancomycin and Zosyn.  ID was asked to evaluate and  manage her antibiotic therapy.       Subjective:    21: The patient did have some chills last night but fevers seem to be better today.  Tmax overnight was 100.9°F.  She did have some hypokalemia to a potassium of 2.6.  Pain is well controlled but still occasionally having some left lower quadrant and central abdominal pain. Only some mild nausea that is controlled with Zofran.  No bowel movement yet.            Past Medical History:   Diagnosis Date   • Anemia    • History of chlamydia    • History of trichomonal vaginitis        Past Surgical History:   Procedure Laterality Date   •  SECTION     •  SECTION     •  SECTION WITH TUBAL  2013   • LAPAROSCOPIC ASSISTED VAGINAL HYSTERECTOMY  2021    for AUB-L, pelvic pain. Ovaries retained per patient. Dr. Avani Horta.        Family History   Problem Relation Age of Onset   • Breast cancer Maternal Grandmother    • Ovarian cancer Neg Hx    • Colon cancer Neg Hx        Social History     Socioeconomic History   • Marital status: Single     Spouse name: Not on file   • Number of children: Not on file   • Years of education: Not on file   • Highest education level: Not on file   Tobacco Use   • Smoking status: Former Smoker   • Smokeless tobacco: Never Used   • Tobacco comment: off and on for 3 years    Substance and Sexual Activity   • Alcohol use: Yes     Comment: occ   • Drug use: Never       No Known Allergies      Medication:    Current Facility-Administered Medications:   •  acetaminophen (TYLENOL) tablet 650 mg, 650 mg, Oral, Q4H PRN, 650 mg at 21 0443 **OR** acetaminophen (TYLENOL) 160 MG/5ML solution 650 mg, 650 mg, Oral, Q4H PRN **OR** acetaminophen (TYLENOL) suppository 650 mg, 650 mg, Rectal, Q4H PRN, Tierney Martinez MD  •  DAPTOmycin (CUBICIN) 450 mg in sodium chloride 0.9 % 50 mL IVPB, 6 mg/kg (Adjusted), Intravenous, Q24H, Girish Martin PA, Last Rate: 100 mL/hr at 21 1844, 450 mg at 21  1844  •  docusate sodium (COLACE) capsule 100 mg, 100 mg, Oral, BID, Walter Prieto MD  •  enoxaparin (LOVENOX) syringe 40 mg, 40 mg, Subcutaneous, Daily, Walter Prieto MD  •  HYDROcodone-acetaminophen (NORCO) 7.5-325 MG per tablet 1 tablet, 1 tablet, Oral, Q6H PRN, Walter Prieto MD, 1 tablet at 07/06/21 1535  •  HYDROmorphone (DILAUDID) injection 0.5 mg, 0.5 mg, Intravenous, Q2H PRN, Tierney Martinez MD, 0.5 mg at 07/06/21 0830  •  Magnesium Sulfate 2 gram Bolus, followed by 8 gram infusion (total Mg dose 10 grams)- Mg less than or equal to 1mg/dL, 2 g, Intravenous, PRN **OR** Magnesium Sulfate 2 gram / 50mL Infusion (GIVE X 3 BAGS TO EQUAL 6GM TOTAL DOSE) - Mg 1.1 - 1.5 mg/dl, 2 g, Intravenous, PRN **OR** Magnesium Sulfate 4 gram infusion- Mg 1.6-1.9 mg/dL, 4 g, Intravenous, PRN, Walter Prieto MD  •  ondansetron (ZOFRAN) tablet 4 mg, 4 mg, Oral, Q6H PRN **OR** ondansetron (ZOFRAN) injection 4 mg, 4 mg, Intravenous, Q6H PRN, Tierney Martinez MD, 4 mg at 07/06/21 0947  •  phenol (CHLORASEPTIC) 1.4 % liquid 2 spray, 2 spray, Mouth/Throat, Q2H PRN, Clovis, Corin, APRN  •  piperacillin-tazobactam (ZOSYN) 4.5 g in iso-osmotic dextrose 100 mL IVPB (premix), 4.5 g, Intravenous, Q8H, Tierney Martinez MD, 4.5 g at 07/06/21 1149  •  polyethylene glycol (MIRALAX) packet 17 g, 17 g, Oral, Daily, Walter Prieto MD, 17 g at 07/06/21 0830  •  potassium chloride (MICRO-K) CR capsule 40 mEq, 40 mEq, Oral, PRN, 40 mEq at 07/06/21 0943 **OR** potassium chloride (KLOR-CON) packet 40 mEq, 40 mEq, Oral, PRN **OR** potassium chloride 10 mEq in 100 mL IVPB, 10 mEq, Intravenous, Q1H PRN, Walter Prieto MD  •  Sodium Chloride (PF) 0.9 % 10 mL, 10 mL, Intravenous, PRN, Tierney Martinez MD  •  sodium chloride 0.9 % flush 1-10 mL, 1-10 mL, Intravenous, PRN, Tierney Martinez MD, 10 mL at 07/04/21 1638  •  sodium chloride 0.9 % flush 10 mL, 10 mL, Intravenous, Q12H, Tierney Martinez MD, 10 mL at 07/06/21 0830  •  sodium  chloride 0.9 % infusion, 100 mL/hr, Intravenous, Continuous, Walter Prieto MD, Last Rate: 100 mL/hr at 21 0614, 100 mL/hr at 21    Antibiotics:  Anti-Infectives (From admission, onward)    Ordered     Dose/Rate Route Frequency Start Stop    21 1631  DAPTOmycin (CUBICIN) 450 mg in sodium chloride 0.9 % 50 mL IVPB     Neri Prater, [facility] reviewed the order on 21.   Ordering Provider: Girish Martin PA    6 mg/kg × 75.6 kg (Adjusted)  100 mL/hr over 30 Minutes Intravenous Every 24 Hours 21 1800 21 1759    21 0423  piperacillin-tazobactam (ZOSYN) 4.5 g in iso-osmotic dextrose 100 mL IVPB (premix)     Neri Prater [facility] reviewed the order on 21.   Ordering Provider: Tierney Martinez MD    4.5 g  over 4 Hours Intravenous Every 8 Hours 21 1200 21 1159    21 0423  piperacillin-tazobactam (ZOSYN) 4.5 g in iso-osmotic dextrose 100 mL IVPB (premix)     Ordering Provider: Tierney Martinez MD    4.5 g  over 30 Minutes Intravenous Once 21 0600 21 0528    21 2355  vancomycin 1750 mg/500 mL 0.9% NS IVPB ([facility])     Ordering Provider: Lito Machuca DO    20 mg/kg × 83.9 kg  over 120 Minutes Intravenous Once 21 2357 21 0250    21 2355  piperacillin-tazobactam (ZOSYN) 3.375 g in iso-osmotic dextrose 50 ml (premix)     Ordering Provider: Lito Machuca DO    3.375 g  over 30 Minutes Intravenous Once 21 2357 21 0140            Review of Systems:  As above    Physical Exam:   Vital Signs  Temp (24hrs), Av.9 °F (37.2 °C), Min:97.9 °F (36.6 °C), Max:100.1 °F (37.8 °C)    Temp  Min: 97.9 °F (36.6 °C)  Max: 100.1 °F (37.8 °C)  BP  Min: 99/61  Max: 119/80  Pulse  Min: 64  Max: 88  Resp  Min: 18  Max: 18  SpO2  Min: 93 %  Max: 97 %    GENERAL: Awake and alert, in no acute distress. Lying comfortably in bed.  HEENT: Normocephalic, atraumatic.  No labial ulcers noted  NECK: Supple without  nuchal rigidity. No mass.  HEART: RRR; No murmur, rubs, gallops.   LUNGS: Clear to auscultation bilaterally without wheezing, rales, rhonchi. Normal respiratory effort. Nonlabored.  ABDOMEN: Soft, Mild Left lower abdominal tenderness, Nondistended. Laparoscopic incision sites are healing well. Quiet bowel sounds. No rebound or guarding. NO mass or HSM.  EXT:  No cyanosis, clubbing or edema. No cord.  :  Without Menon catheter.   MSK:  FROM, no joint effusions  SKIN: Warm and dry without cutaneous eruptions on Inspection/palpation.    NEURO: Oriented to PPT. Normal speech and cognition.  PSYCHIATRIC: Normal insight and judgment. Cooperative with PE    Laboratory Data    Results from last 7 days   Lab Units 07/06/21  0713 07/05/21  0756 07/03/21  2230   WBC 10*3/mm3 10.84* 10.08 14.40*   HEMOGLOBIN g/dL 10.1* 11.0* 12.2   HEMATOCRIT % 31.1* 33.5* 37.2   PLATELETS 10*3/mm3 158 170 175     Results from last 7 days   Lab Units 07/06/21  0713   SODIUM mmol/L 137   POTASSIUM mmol/L 2.6*   CHLORIDE mmol/L 98   CO2 mmol/L 31.0*   BUN mg/dL 2*   CREATININE mg/dL 0.62   GLUCOSE mg/dL 98   CALCIUM mg/dL 8.7     Results from last 7 days   Lab Units 07/06/21  0713   ALK PHOS U/L 76   BILIRUBIN mg/dL 0.4   ALT (SGPT) U/L 6   AST (SGOT) U/L 13         Results from last 7 days   Lab Units 07/06/21  0713   CRP mg/dL 34.97*     Results from last 7 days   Lab Units 07/03/21  2230   LACTATE mmol/L 0.8     Results from last 7 days   Lab Units 07/05/21  0756   CK TOTAL U/L 32     Results from last 7 days   Lab Units 07/05/21  1230   VANCOMYCIN TR mcg/mL <4.00*     Estimated Creatinine Clearance: 155.1 mL/min (by C-G formula based on SCr of 0.62 mg/dL).      Microbiology:  Microbiology Results (last 10 days)     Procedure Component Value - Date/Time    MRSA Screen, PCR (Inpatient) - Swab, Nares [[id number]]  (Normal) Collected: 07/05/21 1828    Lab Status: Final result Specimen: Swab from Nares Updated: 07/05/21 2036     MRSA PCR Negative     Narrative:      MRSA Negative    COVID PRE-OP / PRE-PROCEDURE SCREENING ORDER (NO ISOLATION) - Swab, Nasopharynx [[id number]]  (Normal) Collected: 07/04/21 0302    Lab Status: Final result Specimen: Swab from Nasopharynx Updated: 07/04/21 0408    Narrative:      The following orders were created for panel order COVID PRE-OP / PRE-PROCEDURE SCREENING ORDER (NO ISOLATION) - Swab, Nasopharynx.  Procedure                               Abnormality         Status                     ---------                               -----------         ------                     COVID-19,CEPHEID,JORGE IN-...[[id number]]  Normal              Final result                 Please view results for these tests on the individual orders.    COVID-19,CEPHEID,JORGE IN-HOUSE(OR EMERGENT/ADD-ON),NP SWAB IN TRANSPORT MEDIA 3-4 HR TAT - Swab, Nasopharynx [[id number]]  (Normal) Collected: 07/04/21 0302    Lab Status: Final result Specimen: Swab from Nasopharynx Updated: 07/04/21 0408     COVID19 Not Detected    Narrative:      Fact sheet for providers: https://www.fda.gov/media/654053/download     Fact sheet for patients: https://www.fda.gov/media/640672/download  Fact sheet for providers: https://www.fda.gov/media/656256/download     Fact sheet for patients: https://www.fda.gov/media/852466/download    Blood Culture - Blood, Arm, Left [[id number]] Collected: 07/04/21 0017    Lab Status: Preliminary result Specimen: Blood from Arm, Left Updated: 07/06/21 0445     Blood Culture No growth at 2 days    Blood Culture - Blood, Arm, Right [[id number]] Collected: 07/04/21 0017    Lab Status: Preliminary result Specimen: Blood from Arm, Right Updated: 07/06/21 0445     Blood Culture No growth at 2 days              Radiology:  Imaging Results (Last 72 Hours)     Procedure Component Value Units Date/Time    CT Guided Biopsy Aspiration Injection [[id number]] Collected: 07/05/21 1220     Updated: 07/05/21 1230    Narrative:      EXAMINATION: CT GUIDED  ASPIRATION-perirectal collection.     INDICATION: History of hysterectomy approximately one month prior to  presentation with recent fevers and abdominal pain found to have  multifocal fluid collections within the lower abdomen and pelvis  suspicious for abscesses. Presents to interventional radiology for  CT-guided transgluteal perirectal drain placement.     TECHNIQUE: After informed written consent, the patient was placed prone  on the CT table.  CT revealed an approximately 5.5 x 2.8 cm  collection in the pouch of Drew. This was amenable to drainage. The  patient was prepped and draped in the normal sterile fashion. 1%  lidocaine was infiltrated for local anesthesia. Under CT fluoroscopy  scopic guidance, an 18-gauge Chiba needle was advanced into the  collection. Discussion was found to be quite mobile and the needle was  unable to be advanced into the lesion proper. After 2 attempts, I  decided to abort the procedure. The patient tolerated the procedure  well.     The radiation dose reduction device was turned on for each scan per the  ALARA (As Low as Reasonably Achievable) protocol.     COMPARISON: CT abdomen and pelvis from 7/3/2021     FINDINGS: Mobile perirectal collection possibly representing an early  abscess. Unable to be punctured due to mobility. Procedure was aborted.          Impression:      Mobile perirectal collection similar to that seen on prior  imaging. Due to mobility, procedure was aborted. Recommend follow-up  imaging in 3 days to evaluate for possible reattempt.        This report was finalized on 7/5/2021 12:27 PM by Catalina Bowman.       CT Abdomen Pelvis With Contrast [[id number]] Collected: 07/03/21 2352     Updated: 07/03/21 1154    Narrative:      CT ABDOMEN AND PELVIS WITH CONTRAST, 7/3/2021    HISTORY:  31-year-old female one month postop hysterectomy presenting to the ED complaining of low abdomen pain and decreased bowel movements.    TECHNIQUE:  CT imaging of the  abdomen and pelvis with IV contrast. Radiation dose reduction techniques included automated exposure control. Radiation audit for CT and nuclear cardiology exams in the last 12 months: 0.    PELVIS FINDINGS:  Postoperative changes of hysterectomy. Edema and soft tissue stranding is seen throughout the pelvic mesentery, and there are multiple small, scattered rim-enhancing interloop fluid collections within the low pelvis suspicious for multifocal abscess. The  largest collection in the low prerectal midline measures about 5.7 cm (image 73). There is soft tissue thickening of the vaginal cuff, but there is no abscess or air collection in this location. There is no air within the urinary bladder. Rectum is  unremarkable. 3.1 cm left ovary cyst.    ABDOMEN FINDINGS:  Small bowel and colon are normal in caliber with no evidence of bowel obstruction or significant adynamic ileus. No fluid collections are seen within the abdominal peritoneal or retroperitoneal cavities.    Both kidneys are normal in appearance with no evidence of upper urinary tract obstruction on the right or left. Normal renal parenchymal enhancement.    No gallbladder distention or bile duct dilatation. Liver, pancreas and spleen appear normal.    Lung base images show no active disease.      Impression:      1.  Postop hysterectomy with extensive peritoneal inflammation throughout the pelvis. There are multiple small peripherally enhancing interloop fluid collections scattered within the low pelvis concerning for interloop abscesses. Most of these measure  only 1 to 2 cm. Largest low midline prerectal collection measures up to 5.7 cm.  2.  Soft tissue thickening of the vaginal cuff but no obvious evidence of cuff dehiscence. There is no abscess or free air at the cuff. No air within the bladder.  3.  No evidence of upper urinary tract obstruction.    Signer Name: William Cox MD   Signed: 7/3/2021 11:52 PM   Workstation Name: [id number] Render     [facility] (Dr Frias) read her CT A/P and agree with the above report    Impression:   - Sepsis with fever, tachycardia, leukocytosis, and post-operative pelvic abscesses  - Multiple pelvic abscess after prior hysterectomy 5/27/21.  CT-guided aspiration of 5.7 cm abscess was aborted as it was not yet loculated and drain placement was not possible.  Can reassess for drainage in 3 days.   - Leukocytosis/neutrophilia, improved  - Pelvic pain  - Fever, improving  - Abnormal uterine bleeding/fibroid tumors s/p hysterectomy (vaginal-assisted) with ovaries retained  -hypokalemia- New. Need to replete when necessary    PLAN/RECOMMENDATIONS:   Thank you for asking us to see Anisa Sosa, I recommend the following:  - Follow blood cultures.  If I and D or drain place, send for routine culture, fungal culture and follow.  Check MRSA PCR  - Continue Zosyn for empiric IA coverage  - Continue Daptomycin 6 mg/kg IV daily to empirically cover MRSA and VRE.  Check CPK.  - If worsens, may consider antifungal coverage.  - Gynecology following.   - Could consider repeating CT abdomen and pelvis on Thursday        Titus Frias MD  7/6/2021  15:48 EDT

## 2021-07-06 NOTE — PLAN OF CARE
Goal Outcome Evaluation:           Progress: improving  Outcome Summary: VSS.  No acute overnight events.  Dilaudid given Q2 for pain control.  Up ad geo.  Room Air.  Patient requesting help with STD papers.  Will continue to monitor.

## 2021-07-06 NOTE — CASE MANAGEMENT/SOCIAL WORK
Continued Stay Note  [facility]     Patient Name: Anisa Sosa  MRN: [id number]  Today's Date: 7/6/2021    Admit Date: 7/3/2021    Discharge Plan     Row Name 07/06/21 1433       Plan    Plan  Social work faxed a letter to the [facility] on behalf of the patient to fax: [phone] claim number:[id number]        Discharge Codes    No documentation.             KARYNA Walsh

## 2021-07-07 LAB
ALBUMIN SERPL-MCNC: 2.6 G/DL (ref 3.5–5.2)
ALBUMIN/GLOB SERPL: 0.9 G/DL
ALP SERPL-CCNC: 69 U/L (ref 39–117)
ALT SERPL W P-5'-P-CCNC: 6 U/L (ref 1–33)
ANION GAP SERPL CALCULATED.3IONS-SCNC: 8 MMOL/L (ref 5–15)
AST SERPL-CCNC: 11 U/L (ref 1–32)
BASOPHILS # BLD AUTO: 0.04 10*3/MM3 (ref 0–0.2)
BASOPHILS NFR BLD AUTO: 0.4 % (ref 0–1.5)
BILIRUB SERPL-MCNC: 0.3 MG/DL (ref 0–1.2)
BUN SERPL-MCNC: 3 MG/DL (ref 6–20)
BUN/CREAT SERPL: 4.8 (ref 7–25)
CALCIUM SPEC-SCNC: 8.6 MG/DL (ref 8.6–10.5)
CHLORIDE SERPL-SCNC: 98 MMOL/L (ref 98–107)
CO2 SERPL-SCNC: 29 MMOL/L (ref 22–29)
CREAT SERPL-MCNC: 0.63 MG/DL (ref 0.57–1)
DEPRECATED RDW RBC AUTO: 44.4 FL (ref 37–54)
EOSINOPHIL # BLD AUTO: 0.11 10*3/MM3 (ref 0–0.4)
EOSINOPHIL NFR BLD AUTO: 1 % (ref 0.3–6.2)
ERYTHROCYTE [DISTWIDTH] IN BLOOD BY AUTOMATED COUNT: 12.6 % (ref 12.3–15.4)
GFR SERPL CREATININE-BSD FRML MDRD: 133 ML/MIN/1.73
GLOBULIN UR ELPH-MCNC: 3 GM/DL
GLUCOSE SERPL-MCNC: 121 MG/DL (ref 65–99)
HCT VFR BLD AUTO: 30.6 % (ref 34–46.6)
HGB BLD-MCNC: 9.8 G/DL (ref 12–15.9)
IMM GRANULOCYTES # BLD AUTO: 0.06 10*3/MM3 (ref 0–0.05)
IMM GRANULOCYTES NFR BLD AUTO: 0.5 % (ref 0–0.5)
LYMPHOCYTES # BLD AUTO: 1.01 10*3/MM3 (ref 0.7–3.1)
LYMPHOCYTES NFR BLD AUTO: 9.1 % (ref 19.6–45.3)
MAGNESIUM SERPL-MCNC: 2.4 MG/DL (ref 1.6–2.6)
MCH RBC QN AUTO: 30.7 PG (ref 26.6–33)
MCHC RBC AUTO-ENTMCNC: 32 G/DL (ref 31.5–35.7)
MCV RBC AUTO: 95.9 FL (ref 79–97)
MONOCYTES # BLD AUTO: 0.58 10*3/MM3 (ref 0.1–0.9)
MONOCYTES NFR BLD AUTO: 5.2 % (ref 5–12)
NEUTROPHILS NFR BLD AUTO: 83.8 % (ref 42.7–76)
NEUTROPHILS NFR BLD AUTO: 9.34 10*3/MM3 (ref 1.7–7)
NRBC BLD AUTO-RTO: 0 /100 WBC (ref 0–0.2)
PLATELET # BLD AUTO: 184 10*3/MM3 (ref 140–450)
PMV BLD AUTO: 10.8 FL (ref 6–12)
POTASSIUM SERPL-SCNC: 3.3 MMOL/L (ref 3.5–5.2)
POTASSIUM SERPL-SCNC: 3.4 MMOL/L (ref 3.5–5.2)
PROT SERPL-MCNC: 5.6 G/DL (ref 6–8.5)
RBC # BLD AUTO: 3.19 10*6/MM3 (ref 3.77–5.28)
SODIUM SERPL-SCNC: 135 MMOL/L (ref 136–145)
WBC # BLD AUTO: 11.14 10*3/MM3 (ref 3.4–10.8)

## 2021-07-07 PROCEDURE — 99232 SBSQ HOSP IP/OBS MODERATE 35: CPT | Performed by: OBSTETRICS & GYNECOLOGY

## 2021-07-07 PROCEDURE — 25010000002 PIPERACILLIN SOD-TAZOBACTAM PER 1 G: Performed by: INTERNAL MEDICINE

## 2021-07-07 PROCEDURE — 83735 ASSAY OF MAGNESIUM: CPT | Performed by: INTERNAL MEDICINE

## 2021-07-07 PROCEDURE — 84132 ASSAY OF SERUM POTASSIUM: CPT | Performed by: INTERNAL MEDICINE

## 2021-07-07 PROCEDURE — 85025 COMPLETE CBC W/AUTO DIFF WBC: CPT | Performed by: INTERNAL MEDICINE

## 2021-07-07 PROCEDURE — 25010000002 ENOXAPARIN PER 10 MG: Performed by: INTERNAL MEDICINE

## 2021-07-07 PROCEDURE — 25010000002 DAPTOMYCIN PER 1 MG: Performed by: PHYSICIAN ASSISTANT

## 2021-07-07 PROCEDURE — 99233 SBSQ HOSP IP/OBS HIGH 50: CPT | Performed by: INTERNAL MEDICINE

## 2021-07-07 PROCEDURE — 25010000002 HYDROMORPHONE PER 4 MG: Performed by: INTERNAL MEDICINE

## 2021-07-07 PROCEDURE — 25010000002 ONDANSETRON PER 1 MG: Performed by: INTERNAL MEDICINE

## 2021-07-07 PROCEDURE — 80053 COMPREHEN METABOLIC PANEL: CPT | Performed by: INTERNAL MEDICINE

## 2021-07-07 RX ORDER — BISACODYL 10 MG
10 SUPPOSITORY, RECTAL RECTAL DAILY PRN
Status: DISCONTINUED | OUTPATIENT
Start: 2021-07-07 | End: 2021-07-11 | Stop reason: SDUPTHER

## 2021-07-07 RX ORDER — BISACODYL 10 MG
10 SUPPOSITORY, RECTAL RECTAL DAILY
Status: DISCONTINUED | OUTPATIENT
Start: 2021-07-07 | End: 2021-07-07

## 2021-07-07 RX ORDER — HYDROCODONE BITARTRATE AND ACETAMINOPHEN 7.5; 325 MG/1; MG/1
1 TABLET ORAL EVERY 4 HOURS PRN
Status: DISCONTINUED | OUTPATIENT
Start: 2021-07-07 | End: 2021-07-09

## 2021-07-07 RX ADMIN — SODIUM CHLORIDE 100 ML/HR: 9 INJECTION, SOLUTION INTRAVENOUS at 21:54

## 2021-07-07 RX ADMIN — POTASSIUM CHLORIDE 40 MEQ: 750 CAPSULE, EXTENDED RELEASE ORAL at 07:34

## 2021-07-07 RX ADMIN — DOCUSATE SODIUM 100 MG: 100 CAPSULE, LIQUID FILLED ORAL at 22:43

## 2021-07-07 RX ADMIN — HYDROMORPHONE HYDROCHLORIDE 0.5 MG: 1 INJECTION, SOLUTION INTRAMUSCULAR; INTRAVENOUS; SUBCUTANEOUS at 11:27

## 2021-07-07 RX ADMIN — SODIUM CHLORIDE, PRESERVATIVE FREE 10 ML: 5 INJECTION INTRAVENOUS at 08:12

## 2021-07-07 RX ADMIN — DOCUSATE SODIUM 100 MG: 100 CAPSULE, LIQUID FILLED ORAL at 08:12

## 2021-07-07 RX ADMIN — HYDROCODONE BITARTRATE AND ACETAMINOPHEN 1 TABLET: 7.5; 325 TABLET ORAL at 03:58

## 2021-07-07 RX ADMIN — HYDROMORPHONE HYDROCHLORIDE 0.5 MG: 1 INJECTION, SOLUTION INTRAMUSCULAR; INTRAVENOUS; SUBCUTANEOUS at 04:44

## 2021-07-07 RX ADMIN — ENOXAPARIN SODIUM 40 MG: 40 INJECTION SUBCUTANEOUS at 08:12

## 2021-07-07 RX ADMIN — HYDROCODONE BITARTRATE AND ACETAMINOPHEN 1 TABLET: 7.5; 325 TABLET ORAL at 22:43

## 2021-07-07 RX ADMIN — HYDROMORPHONE HYDROCHLORIDE 0.5 MG: 1 INJECTION, SOLUTION INTRAMUSCULAR; INTRAVENOUS; SUBCUTANEOUS at 08:12

## 2021-07-07 RX ADMIN — BISACODYL 10 MG: 10 SUPPOSITORY RECTAL at 15:06

## 2021-07-07 RX ADMIN — POLYETHYLENE GLYCOL 3350 17 G: 17 POWDER, FOR SOLUTION ORAL at 08:12

## 2021-07-07 RX ADMIN — HYDROMORPHONE HYDROCHLORIDE 0.5 MG: 1 INJECTION, SOLUTION INTRAMUSCULAR; INTRAVENOUS; SUBCUTANEOUS at 18:19

## 2021-07-07 RX ADMIN — ONDANSETRON 4 MG: 2 INJECTION INTRAMUSCULAR; INTRAVENOUS at 08:28

## 2021-07-07 RX ADMIN — TAZOBACTAM SODIUM AND PIPERACILLIN SODIUM 4.5 G: 500; 4 INJECTION, SOLUTION INTRAVENOUS at 22:42

## 2021-07-07 RX ADMIN — HYDROMORPHONE HYDROCHLORIDE 0.5 MG: 1 INJECTION, SOLUTION INTRAMUSCULAR; INTRAVENOUS; SUBCUTANEOUS at 01:20

## 2021-07-07 RX ADMIN — HYDROMORPHONE HYDROCHLORIDE 0.5 MG: 1 INJECTION, SOLUTION INTRAMUSCULAR; INTRAVENOUS; SUBCUTANEOUS at 21:54

## 2021-07-07 RX ADMIN — DAPTOMYCIN 450 MG: 500 INJECTION, POWDER, LYOPHILIZED, FOR SOLUTION INTRAVENOUS at 18:20

## 2021-07-07 RX ADMIN — TAZOBACTAM SODIUM AND PIPERACILLIN SODIUM 4.5 G: 500; 4 INJECTION, SOLUTION INTRAVENOUS at 11:23

## 2021-07-07 RX ADMIN — TAZOBACTAM SODIUM AND PIPERACILLIN SODIUM 4.5 G: 500; 4 INJECTION, SOLUTION INTRAVENOUS at 03:49

## 2021-07-07 RX ADMIN — HYDROCODONE BITARTRATE AND ACETAMINOPHEN 1 TABLET: 7.5; 325 TABLET ORAL at 15:06

## 2021-07-07 NOTE — PLAN OF CARE
Pt vss, on RA, NSR on tele. Pt has complained of abdominal pain throughout the shift. PRN dilaudid and Norco administered, see MAR. Pt states both together do give some relief and that activity like getting up to use the bathroom make it worse. Pt is resting at this time.

## 2021-07-07 NOTE — PLAN OF CARE
Goal Outcome Evaluation:         Patient VSS on RA. Had very large bowel movement today after given suppository. Near end of shift complained of right shoulder pain that patient had experienced a few days before, that worsens when she breathes, but gave dilaudid and it decreased her pain. Patient is NPO with consent forms signed. No other issues, will continue POC.

## 2021-07-07 NOTE — CASE MANAGEMENT/SOCIAL WORK
"Continued Stay Note  [facility]     Patient Name: Anisa Sosa  MRN: [id number]  Today's Date: 7/7/2021    Admit Date: 7/3/2021    Discharge Plan     Row Name 07/07/21 1618       Plan    Plan  CM update    Plan Comments  Patient is not ready for discharge today due to increase in nausea and vomiting. Per MD note: Plan to \"repeat CT scan in am and see if there any collection of fluid amenable to drainage.\" Goal remains to return home upon discharge - CM will continue to follow for discharge planning - diego [phone]    Final Discharge Disposition Code  01 - home or self-care        Discharge Codes    No documentation.             Diego Farrell RN    "

## 2021-07-07 NOTE — PROGRESS NOTES
INFECTIOUS DISEASE CONSULT/INITIAL HOSPITAL VISIT    Anisa Sosa  1990  [id number]    Date of Consult: 7/5/21  Admission Date: 7/3/2021      Requesting Provider: Walter Prieto MD  Evaluating Physician: Titus Frias MD    Reason for Consultation: intraabdominal abscesses after hysterectomy    History of present illness:    Patient is a 31 y.o. female with h/o STIs and abnormal uterine bleeding/uterine fibroids s/p lap vaginal-assisted hysterectomy in [place] on 5/27/21 (with ovaries retained) who followed up a week later and was placed on 5 days of Cipro for possible UTI. She did relatively well post-operatively with mild tenderness.  Last Sunday, she went to a tournament in [place] to watch her daughter and started feeling fatigued.  She slept for a couple of days, but on Tuesday, she began having severe abdominal pain in lower part of abdomen.  She has some dizziness and lightheadedness with the pain.  She went to [facility] ED on 6/30 with a CT scan at that time showing some inflammation of the pelvic area.  She was sent home on ibuprofen, pain meds, and Cipro.  She continued to have pain and pelvic pressure with no relief from medications or heating pad.  She had some difficulty breathing because of the bloating and pressure.  She had not had a BM since 6/29. She started having nausea and vomiting on 7/1.  On 7/2, she had worsening pain and came to [facility] ED on 7/3.  A CT scan here showed interloop abscesses 1 to 2 cm in sizes and a 5.7 cm abscess near the rectum.  A CT-guided aspiration of prerectal abscess was attempted today but aborted d/t the fluid collection being mobile.  Her Tmax since arrival has been 102.5.  Pertinent labs are PCT 0.3, lactic acid 0.8, WBC 14,400 with 90% neutrophils, creatinine 0.72, CRP 31.7, and UA WBC 3-5. Blood cultures are negative to date. A COVID-19 PCR was negative. She is currently on Vancomycin and Zosyn.  ID was asked to evaluate and  manage her antibiotic therapy.       Subjective:    21: The patient did have some chills last night but fevers seem to be better today.  Tmax overnight was 100.9°F.  She did have some hypokalemia to a potassium of 2.6.  Pain is well controlled but still occasionally having some left lower quadrant and central abdominal pain. Only some mild nausea that is controlled with Zofran.  No bowel movement yet.       21: Still having some lower abdominal pain but no worse.  Having nausea that is well controlled with Zofran.  Fevers have improved today with Tmax 99.7°F.  She is tolerating the antibiotics well without any adverse side effects.      Past Medical History:   Diagnosis Date   • Anemia    • History of chlamydia    • History of trichomonal vaginitis        Past Surgical History:   Procedure Laterality Date   •  SECTION     •  SECTION     •  SECTION WITH TUBAL  2013   • LAPAROSCOPIC ASSISTED VAGINAL HYSTERECTOMY  2021    for AUB-L, pelvic pain. Ovaries retained per patient. Dr. Avani Horta.        Family History   Problem Relation Age of Onset   • Breast cancer Maternal Grandmother    • Ovarian cancer Neg Hx    • Colon cancer Neg Hx        Social History     Socioeconomic History   • Marital status: Single     Spouse name: Not on file   • Number of children: Not on file   • Years of education: Not on file   • Highest education level: Not on file   Tobacco Use   • Smoking status: Former Smoker   • Smokeless tobacco: Never Used   • Tobacco comment: off and on for 3 years    Substance and Sexual Activity   • Alcohol use: Yes     Comment: occ   • Drug use: Never       No Known Allergies      Medication:    Current Facility-Administered Medications:   •  acetaminophen (TYLENOL) tablet 650 mg, 650 mg, Oral, Q4H PRN, 650 mg at 21 0443 **OR** acetaminophen (TYLENOL) 160 MG/5ML solution 650 mg, 650 mg, Oral, Q4H PRN **OR** acetaminophen (TYLENOL) suppository 650 mg,  650 mg, Rectal, Q4H PRN, Tierney Martinez MD  •  bisacodyl (DULCOLAX) suppository 10 mg, 10 mg, Rectal, Daily PRN, Maribel Varela MD, 10 mg at 07/07/21 1506  •  DAPTOmycin (CUBICIN) 450 mg in sodium chloride 0.9 % 50 mL IVPB, 6 mg/kg (Adjusted), Intravenous, Q24H, Girish Martin PA, Last Rate: 100 mL/hr at 07/06/21 1728, 450 mg at 07/06/21 1728  •  docusate sodium (COLACE) capsule 100 mg, 100 mg, Oral, BID, Walter Prieto MD, 100 mg at 07/07/21 0812  •  enoxaparin (LOVENOX) syringe 40 mg, 40 mg, Subcutaneous, Daily, Walter Prieto MD, 40 mg at 07/07/21 0812  •  HYDROcodone-acetaminophen (NORCO) 7.5-325 MG per tablet 1 tablet, 1 tablet, Oral, Q4H PRN, Maribel Varela MD, 1 tablet at 07/07/21 1506  •  HYDROmorphone (DILAUDID) injection 0.5 mg, 0.5 mg, Intravenous, Q2H PRN, Tierney Martinez MD, 0.5 mg at 07/07/21 1127  •  Magnesium Sulfate 2 gram Bolus, followed by 8 gram infusion (total Mg dose 10 grams)- Mg less than or equal to 1mg/dL, 2 g, Intravenous, PRN **OR** Magnesium Sulfate 2 gram / 50mL Infusion (GIVE X 3 BAGS TO EQUAL 6GM TOTAL DOSE) - Mg 1.1 - 1.5 mg/dl, 2 g, Intravenous, PRN **OR** Magnesium Sulfate 4 gram infusion- Mg 1.6-1.9 mg/dL, 4 g, Intravenous, PRN, Walter Prieto MD, Last Rate: 25 mL/hr at 07/06/21 2127, 4 g at 07/06/21 2127  •  ondansetron (ZOFRAN) tablet 4 mg, 4 mg, Oral, Q6H PRN **OR** ondansetron (ZOFRAN) injection 4 mg, 4 mg, Intravenous, Q6H PRN, Tierney Martinez MD, 4 mg at 07/07/21 0828  •  phenol (CHLORASEPTIC) 1.4 % liquid 2 spray, 2 spray, Mouth/Throat, Q2H PRN, Corin Brannon APRN  •  piperacillin-tazobactam (ZOSYN) 4.5 g in iso-osmotic dextrose 100 mL IVPB (premix), 4.5 g, Intravenous, Q8H, Titus Frias MD, 4.5 g at 07/07/21 1123  •  polyethylene glycol (MIRALAX) packet 17 g, 17 g, Oral, Daily, Walter Prieto MD, 17 g at 07/07/21 0812  •  potassium chloride (MICRO-K) CR capsule 40 mEq, 40 mEq, Oral, PRN, 40 mEq at 07/07/21 0734 **OR** potassium  chloride (KLOR-CON) packet 40 mEq, 40 mEq, Oral, PRN **OR** potassium chloride 10 mEq in 100 mL IVPB, 10 mEq, Intravenous, Q1H PRN, Walter Prieto MD  •  Sodium Chloride (PF) 0.9 % 10 mL, 10 mL, Intravenous, PRN, Tierney Martinez MD  •  sodium chloride 0.9 % flush 1-10 mL, 1-10 mL, Intravenous, PRN, Tierney Martinez MD, 10 mL at 07/04/21 1638  •  sodium chloride 0.9 % flush 10 mL, 10 mL, Intravenous, Q12H, Tierney Martinez MD, 10 mL at 07/07/21 0812  •  sodium chloride 0.9 % infusion, 100 mL/hr, Intravenous, Continuous, Walter Prieto MD, Last Rate: 100 mL/hr at 07/06/21 2140, 100 mL/hr at 07/06/21 2140    Antibiotics:  Anti-Infectives (From admission, onward)    Ordered     Dose/Rate Route Frequency Start Stop    07/05/21 1631  DAPTOmycin (CUBICIN) 450 mg in sodium chloride 0.9 % 50 mL IVPB     Neri Prater, [facility] reviewed the order on 07/06/21 0722.   Ordering Provider: Girish Martin PA    6 mg/kg × 75.6 kg (Adjusted)  100 mL/hr over 30 Minutes Intravenous Every 24 Hours 07/05/21 1800 07/19/21 1759    07/04/21 0423  piperacillin-tazobactam (ZOSYN) 4.5 g in iso-osmotic dextrose 100 mL IVPB (premix)     Titus Frias MD reviewed the order on 07/07/21 0851.   Ordering Provider: Titus Frias MD    4.5 g  over 4 Hours Intravenous Every 8 Hours 07/04/21 1200 07/18/21 1159    07/04/21 0423  piperacillin-tazobactam (ZOSYN) 4.5 g in iso-osmotic dextrose 100 mL IVPB (premix)     Ordering Provider: Tierney Martinez MD    4.5 g  over 30 Minutes Intravenous Once 07/04/21 0600 07/04/21 0528    07/03/21 2355  vancomycin 1750 mg/500 mL 0.9% NS IVPB ([facility])     Ordering Provider: Lito Machuca DO    20 mg/kg × 83.9 kg  over 120 Minutes Intravenous Once 07/03/21 2357 07/04/21 0250    07/03/21 2355  piperacillin-tazobactam (ZOSYN) 3.375 g in iso-osmotic dextrose 50 ml (premix)     Ordering Provider: Lito Machuca DO    3.375 g  over 30 Minutes Intravenous Once 07/03/21 2357 07/04/21 0140             Review of Systems:  As above    Physical Exam:   Vital Signs  Temp (24hrs), Av.7 °F (37.1 °C), Min:98.1 °F (36.7 °C), Max:99.2 °F (37.3 °C)    Temp  Min: 98.1 °F (36.7 °C)  Max: 99.2 °F (37.3 °C)  BP  Min: 107/63  Max: 114/79  Pulse  Min: 69  Max: 102  Resp  Min: 17  Max: 18  SpO2  Min: 90 %  Max: 94 %    GENERAL: Awake and alert, in no acute distress. Lying comfortably in bed.  HEENT: Normocephalic, atraumatic.  No labial ulcers noted  HEART: RRR; No murmur, rubs, gallops.   LUNGS: CTAB. nonlabored breathing on room air  ABDOMEN: Soft, Mild Left lower abdominal tenderness, Nondistended. Laparoscopic incision sites are healing well. No HSM  EXT:  No cyanosis, clubbing or edema. No cord.  :  Without Menon catheter.   MSK:  FROM, no joint effusions  SKIN: noted rashes noted of her exposed skin.    NEURO: Oriented to PPT. Normal speech and cognition.  PSYCHIATRIC: Normal insight and judgment. Cooperative with PE    Laboratory Data    Results from last 7 days   Lab Units 21  0612 21  0713 21  0756   WBC 10*3/mm3 11.14* 10.84* 10.08   HEMOGLOBIN g/dL 9.8* 10.1* 11.0*   HEMATOCRIT % 30.6* 31.1* 33.5*   PLATELETS 10*3/mm3 184 158 170     Results from last 7 days   Lab Units 21  0612   SODIUM mmol/L 135*   POTASSIUM mmol/L 3.3*   CHLORIDE mmol/L 98   CO2 mmol/L 29.0   BUN mg/dL 3*   CREATININE mg/dL 0.63   GLUCOSE mg/dL 121*   CALCIUM mg/dL 8.6     Results from last 7 days   Lab Units 21  0612   ALK PHOS U/L 69   BILIRUBIN mg/dL 0.3   ALT (SGPT) U/L 6   AST (SGOT) U/L 11         Results from last 7 days   Lab Units 21  0713   CRP mg/dL 34.97*     Results from last 7 days   Lab Units 21  2230   LACTATE mmol/L 0.8     Results from last 7 days   Lab Units 21  0756   CK TOTAL U/L 32     Results from last 7 days   Lab Units 21  1230   VANCOMYCIN TR mcg/mL <4.00*     Estimated Creatinine Clearance: 155 mL/min (by C-G formula based on SCr of 0.63  mg/dL).      Microbiology:  Microbiology Results (last 10 days)     Procedure Component Value - Date/Time    MRSA Screen, PCR (Inpatient) - Swab, Nares [[id number]]  (Normal) Collected: 07/05/21 1828    Lab Status: Final result Specimen: Swab from Nares Updated: 07/05/21 2036     MRSA PCR Negative    Narrative:      MRSA Negative    COVID PRE-OP / PRE-PROCEDURE SCREENING ORDER (NO ISOLATION) - Swab, Nasopharynx [[id number]]  (Normal) Collected: 07/04/21 0302    Lab Status: Final result Specimen: Swab from Nasopharynx Updated: 07/04/21 0408    Narrative:      The following orders were created for panel order COVID PRE-OP / PRE-PROCEDURE SCREENING ORDER (NO ISOLATION) - Swab, Nasopharynx.  Procedure                               Abnormality         Status                     ---------                               -----------         ------                     COVID-19,CEPHEID,JORGE IN-...[[id number]]  Normal              Final result                 Please view results for these tests on the individual orders.    COVID-19,CEPHEID,JORGE IN-HOUSE(OR EMERGENT/ADD-ON),NP SWAB IN TRANSPORT MEDIA 3-4 HR TAT - Swab, Nasopharynx [[id number]]  (Normal) Collected: 07/04/21 0302    Lab Status: Final result Specimen: Swab from Nasopharynx Updated: 07/04/21 0408     COVID19 Not Detected    Narrative:      Fact sheet for providers: https://www.fda.gov/media/618233/download     Fact sheet for patients: https://www.fda.gov/media/719479/download  Fact sheet for providers: https://www.fda.gov/media/102819/download     Fact sheet for patients: https://www.fda.gov/media/192825/download    Blood Culture - Blood, Arm, Left [[id number]] Collected: 07/04/21 0017    Lab Status: Preliminary result Specimen: Blood from Arm, Left Updated: 07/07/21 0445     Blood Culture No growth at 3 days    Blood Culture - Blood, Arm, Right [[id number]] Collected: 07/04/21 0017    Lab Status: Preliminary result Specimen: Blood from Arm, Right Updated: 07/07/21 0445      Blood Culture No growth at 3 days              Radiology:  Imaging Results (Last 72 Hours)     Procedure Component Value Units Date/Time    CT Guided Biopsy Aspiration Injection [[id number]] Collected: 07/05/21 1220     Updated: 07/05/21 1230    Narrative:      EXAMINATION: CT GUIDED ASPIRATION-perirectal collection.     INDICATION: History of hysterectomy approximately one month prior to  presentation with recent fevers and abdominal pain found to have  multifocal fluid collections within the lower abdomen and pelvis  suspicious for abscesses. Presents to interventional radiology for  CT-guided transgluteal perirectal drain placement.     TECHNIQUE: After informed written consent, the patient was placed prone  on the CT table.  CT revealed an approximately 5.5 x 2.8 cm  collection in the pouch of Drew. This was amenable to drainage. The  patient was prepped and draped in the normal sterile fashion. 1%  lidocaine was infiltrated for local anesthesia. Under CT fluoroscopy  scopic guidance, an 18-gauge Chiba needle was advanced into the  collection. Discussion was found to be quite mobile and the needle was  unable to be advanced into the lesion proper. After 2 attempts, I  decided to abort the procedure. The patient tolerated the procedure  well.     The radiation dose reduction device was turned on for each scan per the  ALARA (As Low as Reasonably Achievable) protocol.     COMPARISON: CT abdomen and pelvis from 7/3/2021     FINDINGS: Mobile perirectal collection possibly representing an early  abscess. Unable to be punctured due to mobility. Procedure was aborted.          Impression:      Mobile perirectal collection similar to that seen on prior  imaging. Due to mobility, procedure was aborted. Recommend follow-up  imaging in 3 days to evaluate for possible reattempt.        This report was finalized on 7/5/2021 12:27 PM by Catalina Bowman.               Impression:   - Sepsis with fever, tachycardia,  leukocytosis, and post-operative pelvic abscesses  - Multiple pelvic abscess after prior hysterectomy 5/27/21.  CT-guided aspiration of 5.7 cm abscess was aborted as it was not yet loculated and drain placement was not possible.  Can reassess for drainage in 3 days.   - Leukocytosis/neutrophilia, slightly worse  - Pelvic pain  - Fever, improving  - Abnormal uterine bleeding/fibroid tumors s/p hysterectomy (vaginal-assisted) with ovaries retained  -hypokalemia- ongoing but improved    PLAN/RECOMMENDATIONS:   Thank you for asking us to see Anisa Sosa, I recommend the following:  - Follow blood cultures. -no growth to date  - Continue Zosyn for empiric IA coverage  - Continue Daptomycin 6 mg/kg IV daily to empirically cover MRSA and VRE.   - Gynecology following.   - consider repeating CT abdomen and pelvis on Thursday    I discussed with Dr. Avery Frias MD  7/7/2021  17:39 EDT

## 2021-07-07 NOTE — PROGRESS NOTES
BERNABE Quiana Sosa  : 1990  MRN: [id number]  CSN: [id number]    Hospital Day # 4   POD#41 s/p LAVH/BS at outside hospital   CC: hospital follow up of post op pelvic abscesses  Subjective   Patient still receiving IV and oral narcotics for pain. She had episode of emesis this morning after taking potassium replacement pills.  She reports that she tolerated some apple juice water and chicken broth yesterday.  She reports she still has abdominal pain and rectal pressure but it is improved compared to when she was admitted but she still reports requiring the pain medication to get relief and to be able to ambulate.  No fevers overnight.     Objective     Min/max vitals past 24 hours:   Temp  Min: 98.1 °F (36.7 °C)  Max: 99.2 °F (37.3 °C)  BP  Min: 107/63  Max: 119/80  Pulse  Min: 64  Max: 102  Resp  Min: 18  Max: 18        I/O last 3 completed shifts:  In: 1200 [I.V.:1000; IV Piggyback:200]  Out: 1900 [Urine:1900]    General: well developed; well nourished  no acute distress   Abdomen: soft, mildly TTP throughout lower quadrants   No rebound or guarding   no umbilical or inguinal hernias are present  no hepato-splenomegaly  incision is clean, dry, intact and without drainage   Pelvic: Not performed   Ext: Calves NT     CBC w/ diff:   Lab Results   Component Value Date     2021    HGB 9.8 (L) 2021    HCT 30.6 (L) 2021    MCV 95.9 2021    RDW 12.6 2021    WBC 11.14 (H) 2021    NEUTRORELPCT 83.8 (H) 2021    AUTOIGPER 0.5 2021    LYMPHORELPCT 9.1 (L) 2021    MONORELPCT 5.2 2021    EOSRELPCT 1.0 2021    BASORELPCT 0.4 2021     CMP:   Lab Results   Component Value Date     (L) 2021    K 3.3 (L) 2021    CL 98 2021    CO2 29.0 2021    BUN 3 (L) 2021    CREATININE 0.63 2021    GLUCOSE 121 (H) 2021    ALBUMIN 2.60 (L) 2021    CALCIUM 8.6 2021    AST 11 2021    ALT 6  07/07/2021    BILITOT 0.3 07/07/2021     UA:    Lab Results   Component Value Date    SQUAMEPIUA 3-6 (A) 07/04/2021    SPECGRAVUR 1.026 07/04/2021    KETONESU Negative 07/04/2021    BLOODU Negative 07/04/2021    LEUKOCYTESUR Negative 07/04/2021    NITRITEU Negative 07/04/2021    RBCUA 3-6 (A) 07/04/2021    WBCUA 3-5 (A) 07/04/2021    BACTERIA None Seen 07/04/2021     Urine Culture: No results found for: URINECX       Assessment   1. POD#41 s/p LAVH/BS for AUB-L, pelvic pain per patient.   2. Post operative pelvic abscesses      Plan   1. Continue IV antibiotics per ID  2. Recommend repeat imaging with attempt by IR either today or tomorrow. If unable to be drained and any worsening clinical status will consult gyn oncology to assist with drainage if needed   3. Recommend rectal suppository for constipation if patient amenable given no BM in over a week   4. Discussed plan of care with patient and all questions were answered to the best of my ability    Amee Robertson MD  7/7/2021  08:43 EDT

## 2021-07-07 NOTE — PROGRESS NOTES
[facility] Medicine Services  PROGRESS NOTE    Patient Name: Anisa Sosa  : 1990  MRN: [id number]    Date of Admission: 7/3/2021  Primary Care Physician: Provider, No Known    Subjective   Subjective     CC:  Abdominal pain    HPI:   Pt had emesis x 2 this am and complaining of constipation resistant to oral bowel regimen. Willing to try suppository and, if that is unsuccessful, then willing to do enema. Also, c/o breakthrough pain with PO pain regimen- states that pain meds last the full four hours, however, she needs another dose right at four hours and there is some lag in request for it and actually getting pain meds. Otherwise, she has been afebrile for the last 24 hours.       ROS:  Gen- no fevers, chills  CV- No chest pain, palpitations  Resp- No cough, dyspnea  GI- + abd pain        Objective   Objective     Vital Signs:   Temp:  [98.1 °F (36.7 °C)-99.2 °F (37.3 °C)] 98.1 °F (36.7 °C)  Heart Rate:  [] 75  Resp:  [18] 18  BP: (107-119)/(63-80) 108/69        Physical Exam:  Constitutional - no acute distress, nontoxic, in bed  HEENT-NCAT, mucous membranes moist  CV-RRR, S1 S2 normal, no m/r/g  Resp-CTAB, no wheezes, rhonchi or rales  Abd-soft, mild lower quadrant tenderness, nondistended, normoactive bowel sounds  Ext-No lower extremity cyanosis, clubbing or edema bilaterally  Neuro-alert and oriented x 3, speech clear, moves all extremities   Psych-normal affect   Skin- No rash on exposed UE or LE bilaterally    Results Reviewed:  Results from last 7 days   Lab Units 21  0612 21  0713 21  0756 21  0518 21  2230   WBC 10*3/mm3 11.14* 10.84* 10.08  --  14.40*   HEMOGLOBIN g/dL 9.8* 10.1* 11.0*  --  12.2   HEMATOCRIT % 30.6* 31.1* 33.5*  --  37.2   PLATELETS 10*3/mm3 184 158 170  --  175   INR   --   --   --  1.22*  --    PROCALCITONIN ng/mL  --   --  0.21  --  0.30*     Results from last 7 days   Lab Units 21  0612 21  0035  07/06/21  0713 07/03/21  2230   SODIUM mmol/L 135*  --  137 139   POTASSIUM mmol/L 3.3* 3.4* 2.6* 3.5   CHLORIDE mmol/L 98  --  98 100   CO2 mmol/L 29.0  --  31.0* 28.0   BUN mg/dL 3*  --  2* 6   CREATININE mg/dL 0.63  --  0.62 0.72   GLUCOSE mg/dL 121*  --  98 99   CALCIUM mg/dL 8.6  --  8.7 9.6   ALT (SGPT) U/L 6  --  6 9   AST (SGOT) U/L 11  --  13 13     Estimated Creatinine Clearance: 155 mL/min (by C-G formula based on SCr of 0.63 mg/dL).    Microbiology Results Abnormal     Procedure Component Value - Date/Time    Blood Culture - Blood, Arm, Left [[id number]] Collected: 07/04/21 0017    Lab Status: Preliminary result Specimen: Blood from Arm, Left Updated: 07/07/21 0445     Blood Culture No growth at 3 days    Blood Culture - Blood, Arm, Right [[id number]] Collected: 07/04/21 0017    Lab Status: Preliminary result Specimen: Blood from Arm, Right Updated: 07/07/21 0445     Blood Culture No growth at 3 days    MRSA Screen, PCR (Inpatient) - Swab, Nares [[id number]]  (Normal) Collected: 07/05/21 1828    Lab Status: Final result Specimen: Swab from Nares Updated: 07/05/21 2036     MRSA PCR Negative    Narrative:      MRSA Negative    COVID PRE-OP / PRE-PROCEDURE SCREENING ORDER (NO ISOLATION) - Swab, Nasopharynx [[id number]]  (Normal) Collected: 07/04/21 0302    Lab Status: Final result Specimen: Swab from Nasopharynx Updated: 07/04/21 0408    Narrative:      The following orders were created for panel order COVID PRE-OP / PRE-PROCEDURE SCREENING ORDER (NO ISOLATION) - Swab, Nasopharynx.  Procedure                               Abnormality         Status                     ---------                               -----------         ------                     COVID-19,CEPHEID,JORGE IN-...[[id number]]  Normal              Final result                 Please view results for these tests on the individual orders.    COVID-19,CEPHEID,JORGE IN-HOUSE(OR EMERGENT/ADD-ON),NP SWAB IN TRANSPORT MEDIA 3-4 HR TAT - Swab, Nasopharynx  [[id number]]  (Normal) Collected: 07/04/21 0302    Lab Status: Final result Specimen: Swab from Nasopharynx Updated: 07/04/21 0408     COVID19 Not Detected    Narrative:      Fact sheet for providers: https://www.fda.gov/media/622080/download     Fact sheet for patients: https://www.fda.gov/media/268889/download  Fact sheet for providers: https://www.fda.gov/media/817326/download     Fact sheet for patients: https://www.fda.gov/media/758646/download          Imaging Results (Last 24 Hours)     ** No results found for the last 24 hours. **              I have reviewed the medications:  Scheduled Meds:DAPTOmycin, 6 mg/kg (Adjusted), Intravenous, Q24H  docusate sodium, 100 mg, Oral, BID  enoxaparin, 40 mg, Subcutaneous, Daily  piperacillin-tazobactam, 4.5 g, Intravenous, Q8H  polyethylene glycol, 17 g, Oral, Daily  sodium chloride, 10 mL, Intravenous, Q12H      Continuous Infusions:sodium chloride, 100 mL/hr, Last Rate: 100 mL/hr (07/06/21 2140)      PRN Meds:.•  acetaminophen **OR** acetaminophen **OR** acetaminophen  •  HYDROcodone-acetaminophen  •  HYDROmorphone  •  magnesium sulfate **OR** magnesium sulfate **OR** magnesium sulfate  •  ondansetron **OR** ondansetron  •  phenol  •  potassium chloride **OR** potassium chloride **OR** potassium chloride  •  Sodium Chloride (PF)  •  sodium chloride    Assessment/Plan   Assessment & Plan     Active Hospital Problems    Diagnosis  POA   • Intra-abdominal abscess post-procedure [T81.43XA]  Yes      Resolved Hospital Problems   No resolved problems to display.        Brief Hospital Course to date:  Anisa Sosa is a 31 y.o. female with history of elective laparoscopic hysterectomy in [place] on May 27.  She recently reports increased abdominal pain and anorexia as well as no fevers.  CT abdomen pelvis here showed extensive peritoneal inflammation throughout the pelvis with multiple small peripherally enhancing interloop fluid collections scattered in the low pelvis  with the largest measuring 5.7 cm in the perirectal area.    Fever  Leukocytosis  Intra-abdominal abscesses  - afebrile for last 24 hours  - Leukocytosis had resolved, but now back up again,  but CRP increased to 34 as of yesterday.  -Continue daptomycin/zosyn; consider antifugals if clinical worsening  -no discrete walled abscess amenable for drainage on 7/5 -- may need to re-image if febrile again. D/w Dr. Frias today, will repeat CT scan in am and see if there any collection of fluid amenable to drainage. If so, will ask IR to do drainage.    -- will schedule pain meds as pt is having breakthrough pain due to lag in request and getting next dose  -Pain control    DVT prophylaxis: lovenox    Disposition: I expect the patient to be discharged home tbd    CODE STATUS:   Code Status and Medical Interventions:   Ordered at: 07/04/21 0418     Level Of Support Discussed With:    Patient     Code Status:    CPR     Medical Interventions (Level of Support Prior to Arrest):    Full       Maribel Varela MD  07/07/21

## 2021-07-08 ENCOUNTER — APPOINTMENT (OUTPATIENT)
Dept: CT IMAGING | Facility: HOSPITAL | Age: 31
End: 2021-07-08

## 2021-07-08 PROBLEM — R10.30 LOWER ABDOMINAL PAIN: Status: ACTIVE | Noted: 2021-07-03

## 2021-07-08 PROBLEM — Z90.710 STATUS POST HYSTERECTOMY: Status: ACTIVE | Noted: 2021-07-03

## 2021-07-08 LAB
ANION GAP SERPL CALCULATED.3IONS-SCNC: 10 MMOL/L (ref 5–15)
BUN SERPL-MCNC: 4 MG/DL (ref 6–20)
BUN/CREAT SERPL: 6.5 (ref 7–25)
CALCIUM SPEC-SCNC: 9.1 MG/DL (ref 8.6–10.5)
CHLORIDE SERPL-SCNC: 106 MMOL/L (ref 98–107)
CO2 SERPL-SCNC: 27 MMOL/L (ref 22–29)
CREAT SERPL-MCNC: 0.62 MG/DL (ref 0.57–1)
DEPRECATED RDW RBC AUTO: 44.1 FL (ref 37–54)
ERYTHROCYTE [DISTWIDTH] IN BLOOD BY AUTOMATED COUNT: 12.7 % (ref 12.3–15.4)
GFR SERPL CREATININE-BSD FRML MDRD: 135 ML/MIN/1.73
GLUCOSE SERPL-MCNC: 84 MG/DL (ref 65–99)
HCT VFR BLD AUTO: 31.4 % (ref 34–46.6)
HGB BLD-MCNC: 10.1 G/DL (ref 12–15.9)
MCH RBC QN AUTO: 30.7 PG (ref 26.6–33)
MCHC RBC AUTO-ENTMCNC: 32.2 G/DL (ref 31.5–35.7)
MCV RBC AUTO: 95.4 FL (ref 79–97)
PLATELET # BLD AUTO: 233 10*3/MM3 (ref 140–450)
PMV BLD AUTO: 10.8 FL (ref 6–12)
POTASSIUM SERPL-SCNC: 3.7 MMOL/L (ref 3.5–5.2)
POTASSIUM SERPL-SCNC: 3.8 MMOL/L (ref 3.5–5.2)
RBC # BLD AUTO: 3.29 10*6/MM3 (ref 3.77–5.28)
SODIUM SERPL-SCNC: 143 MMOL/L (ref 136–145)
WBC # BLD AUTO: 15.28 10*3/MM3 (ref 3.4–10.8)

## 2021-07-08 PROCEDURE — 74177 CT ABD & PELVIS W/CONTRAST: CPT

## 2021-07-08 PROCEDURE — 25010000002 DAPTOMYCIN PER 1 MG: Performed by: PHYSICIAN ASSISTANT

## 2021-07-08 PROCEDURE — 99222 1ST HOSP IP/OBS MODERATE 55: CPT | Performed by: OBSTETRICS & GYNECOLOGY

## 2021-07-08 PROCEDURE — 99232 SBSQ HOSP IP/OBS MODERATE 35: CPT | Performed by: OBSTETRICS & GYNECOLOGY

## 2021-07-08 PROCEDURE — 85027 COMPLETE CBC AUTOMATED: CPT | Performed by: INTERNAL MEDICINE

## 2021-07-08 PROCEDURE — 25010000002 IOPAMIDOL 61 % SOLUTION: Performed by: INTERNAL MEDICINE

## 2021-07-08 PROCEDURE — 99233 SBSQ HOSP IP/OBS HIGH 50: CPT | Performed by: INTERNAL MEDICINE

## 2021-07-08 PROCEDURE — 63710000001 ONDANSETRON PER 8 MG: Performed by: INTERNAL MEDICINE

## 2021-07-08 PROCEDURE — 80048 BASIC METABOLIC PNL TOTAL CA: CPT | Performed by: INTERNAL MEDICINE

## 2021-07-08 PROCEDURE — 25010000002 PIPERACILLIN SOD-TAZOBACTAM PER 1 G: Performed by: INTERNAL MEDICINE

## 2021-07-08 PROCEDURE — 25010000002 HYDROMORPHONE PER 4 MG: Performed by: INTERNAL MEDICINE

## 2021-07-08 RX ADMIN — HYDROCODONE BITARTRATE AND ACETAMINOPHEN 1 TABLET: 7.5; 325 TABLET ORAL at 22:06

## 2021-07-08 RX ADMIN — TAZOBACTAM SODIUM AND PIPERACILLIN SODIUM 4.5 G: 500; 4 INJECTION, SOLUTION INTRAVENOUS at 20:31

## 2021-07-08 RX ADMIN — HYDROCODONE BITARTRATE AND ACETAMINOPHEN 1 TABLET: 7.5; 325 TABLET ORAL at 08:32

## 2021-07-08 RX ADMIN — HYDROMORPHONE HYDROCHLORIDE 0.5 MG: 1 INJECTION, SOLUTION INTRAMUSCULAR; INTRAVENOUS; SUBCUTANEOUS at 20:31

## 2021-07-08 RX ADMIN — IOPAMIDOL 100 ML: 612 INJECTION, SOLUTION INTRAVENOUS at 11:33

## 2021-07-08 RX ADMIN — TAZOBACTAM SODIUM AND PIPERACILLIN SODIUM 4.5 G: 500; 4 INJECTION, SOLUTION INTRAVENOUS at 14:10

## 2021-07-08 RX ADMIN — HYDROCODONE BITARTRATE AND ACETAMINOPHEN 1 TABLET: 7.5; 325 TABLET ORAL at 12:57

## 2021-07-08 RX ADMIN — HYDROMORPHONE HYDROCHLORIDE 0.5 MG: 1 INJECTION, SOLUTION INTRAMUSCULAR; INTRAVENOUS; SUBCUTANEOUS at 15:33

## 2021-07-08 RX ADMIN — TAZOBACTAM SODIUM AND PIPERACILLIN SODIUM 4.5 G: 500; 4 INJECTION, SOLUTION INTRAVENOUS at 04:53

## 2021-07-08 RX ADMIN — SODIUM CHLORIDE 100 ML/HR: 9 INJECTION, SOLUTION INTRAVENOUS at 17:19

## 2021-07-08 RX ADMIN — ONDANSETRON HYDROCHLORIDE 4 MG: 4 TABLET, FILM COATED ORAL at 12:57

## 2021-07-08 RX ADMIN — SODIUM CHLORIDE, PRESERVATIVE FREE 10 ML: 5 INJECTION INTRAVENOUS at 14:22

## 2021-07-08 RX ADMIN — DAPTOMYCIN 450 MG: 500 INJECTION, POWDER, LYOPHILIZED, FOR SOLUTION INTRAVENOUS at 17:19

## 2021-07-08 RX ADMIN — HYDROCODONE BITARTRATE AND ACETAMINOPHEN 1 TABLET: 7.5; 325 TABLET ORAL at 17:19

## 2021-07-08 RX ADMIN — HYDROMORPHONE HYDROCHLORIDE 0.5 MG: 1 INJECTION, SOLUTION INTRAMUSCULAR; INTRAVENOUS; SUBCUTANEOUS at 05:22

## 2021-07-08 RX ADMIN — SODIUM CHLORIDE, PRESERVATIVE FREE 10 ML: 5 INJECTION INTRAVENOUS at 20:32

## 2021-07-08 RX ADMIN — HYDROMORPHONE HYDROCHLORIDE 0.5 MG: 1 INJECTION, SOLUTION INTRAMUSCULAR; INTRAVENOUS; SUBCUTANEOUS at 03:05

## 2021-07-08 RX ADMIN — DOCUSATE SODIUM 100 MG: 100 CAPSULE, LIQUID FILLED ORAL at 20:31

## 2021-07-08 NOTE — PLAN OF CARE
Pt vss, on RA, NSR on tele. Pt has complained of pain throughout shift, prn pain meds administered. Pt NPO at midnight for CT of abdomen pelvis with contrast. Consent obtained. NS infusing at 100 ml/hr. Pt has no complaints at this time.

## 2021-07-08 NOTE — PROGRESS NOTES
BERNABE Araya  Anisa Sosa  : 1990  MRN: [id number]  CSN: [id number]    Hospital Day # 5    POD#42 s/p LAVH/BS at outside hospital   CC: hospital follow up of post op pelvic abscesses  Subjective   Patient continues to report abdominal pain and is requiring IV and oral narcotics.  IR was unable to drain anything today on repeat CT imaging.  Patient is tolerating a small amount of diet.  No fevers overnight.     Objective     Min/max vitals past 24 hours:   Temp  Min: 97.5 °F (36.4 °C)  Max: 98.9 °F (37.2 °C)  BP  Min: 108/70  Max: 111/90  Pulse  Min: 64  Max: 83  Resp  Min: 17  Max: 19        I/O last 3 completed shifts:  In: 2938 [P.O.:1838; I.V.:1000; IV Piggyback:100]  Out: 800 [Urine:800]    General: well developed; well nourished  no acute distress   Abdomen: no umbilical or inguinal hernias are present  no hepato-splenomegaly  soft, mildly TTP throughout lower abdomen    Pelvic: Not performed   Ext: Calves NT     BMP:   Lab Results   Component Value Date     2021    K 3.8 2021     2021    CO2 27.0 2021    BUN 4 (L) 2021    CREATININE 0.62 2021     CBC w/ diff:   Lab Results   Component Value Date     2021    HGB 10.1 (L) 2021    HCT 31.4 (L) 2021    MCV 95.4 2021    RDW 12.7 2021    WBC 15.28 (H) 2021    NEUTRORELPCT 83.8 (H) 2021    AUTOIGPER 0.5 2021    LYMPHORELPCT 9.1 (L) 2021    MONORELPCT 5.2 2021    EOSRELPCT 1.0 2021    BASORELPCT 0.4 2021     CMP:   Lab Results   Component Value Date     2021    K 3.8 2021     2021    CO2 27.0 2021    BUN 4 (L) 2021    CREATININE 0.62 2021    GLUCOSE 84 2021    ALBUMIN 2.60 (L) 2021    CALCIUM 9.1 2021    AST 11 2021    ALT 6 2021    BILITOT 0.3 2021          Assessment   1. POD#42 s/p LAVH/BS for AUB-L, pelvic pain per patient.   2. Post  operative pelvic abscesses      Plan   1. Discussed case with Dr. Macedo with GYN oncology and we plan to take the patient to the operating room tomorrow for a diagnostic laparoscopy washout plus minus oophorectomy if indicated.  Plan of care discussed in detail with patient and her mother and they verbalized understanding. patient to be n.p.o. after midnight    Amee Robertson MD  7/8/2021  14:01 EDT

## 2021-07-08 NOTE — PROGRESS NOTES
[facility] Medicine Services  PROGRESS NOTE    Patient Name: Anisa Sosa  : 1990  MRN: [id number]    Date of Admission: 7/3/2021  Primary Care Physician: Provider, No Known    Subjective   Subjective     CC:  Abdominal pain    HPI:   Pt complains of pressure especially when she is voiding or when she had a BM yesterday. No fevers.       ROS:  Gen- no fevers, chills  CV- No chest pain, palpitations  Resp- No cough, dyspnea  GI- + abd pain        Objective   Objective     Vital Signs:   Temp:  [97.5 °F (36.4 °C)-99 °F (37.2 °C)] 98.8 °F (37.1 °C)  Heart Rate:  [64-83] 64  Resp:  [17-19] 19  BP: (108-111)/(70-90) 108/70        Physical Exam:  Constitutional - no acute distress, nontoxic, in bed  HEENT-NCAT, mucous membranes moist  CV-RRR, S1 S2 normal, no m/r/g  Resp-CTAB, no wheezes, rhonchi or rales  Abd-soft, mild lower quadrant tenderness, nondistended, normoactive bowel sounds  Ext-No lower extremity cyanosis, clubbing or edema bilaterally  Neuro-alert and oriented x 3, speech clear, moves all extremities   Psych-normal affect   Skin- No rash on exposed UE or LE bilaterally    Results Reviewed:  Results from last 7 days   Lab Units 21  0704 21  0612 21  0713 21  0756 21  0518 21  2230   WBC 10*3/mm3 15.28* 11.14* 10.84* 10.08  --  14.40*   HEMOGLOBIN g/dL 10.1* 9.8* 10.1* 11.0*  --  12.2   HEMATOCRIT % 31.4* 30.6* 31.1* 33.5*  --  37.2   PLATELETS 10*3/mm3 233 184 158 170  --  175   INR   --   --   --   --  1.22*  --    PROCALCITONIN ng/mL  --   --   --  0.21  --  0.30*     Results from last 7 days   Lab Units 21  0704 21  2335 21  0612 21  0713 21  2230   SODIUM mmol/L 143  --  135* 137 139   POTASSIUM mmol/L 3.8 3.7 3.3* 2.6* 3.5   CHLORIDE mmol/L 106  --  98 98 100   CO2 mmol/L 27.0  --  29.0 31.0* 28.0   BUN mg/dL 4*  --  3* 2* 6   CREATININE mg/dL 0.62  --  0.63 0.62 0.72   GLUCOSE mg/dL 84  --  121* 98  99   CALCIUM mg/dL 9.1  --  8.6 8.7 9.6   ALT (SGPT) U/L  --   --  6 6 9   AST (SGOT) U/L  --   --  11 13 13     Estimated Creatinine Clearance: 158.8 mL/min (by C-G formula based on SCr of 0.62 mg/dL).    Microbiology Results Abnormal     Procedure Component Value - Date/Time    Blood Culture - Blood, Arm, Left [[id number]] Collected: 07/04/21 0017    Lab Status: Preliminary result Specimen: Blood from Arm, Left Updated: 07/08/21 0445     Blood Culture No growth at 4 days    Blood Culture - Blood, Arm, Right [[id number]] Collected: 07/04/21 0017    Lab Status: Preliminary result Specimen: Blood from Arm, Right Updated: 07/08/21 0445     Blood Culture No growth at 4 days    MRSA Screen, PCR (Inpatient) - Swab, Nares [[id number]]  (Normal) Collected: 07/05/21 1828    Lab Status: Final result Specimen: Swab from Nares Updated: 07/05/21 2036     MRSA PCR Negative    Narrative:      MRSA Negative    COVID PRE-OP / PRE-PROCEDURE SCREENING ORDER (NO ISOLATION) - Swab, Nasopharynx [[id number]]  (Normal) Collected: 07/04/21 0302    Lab Status: Final result Specimen: Swab from Nasopharynx Updated: 07/04/21 0408    Narrative:      The following orders were created for panel order COVID PRE-OP / PRE-PROCEDURE SCREENING ORDER (NO ISOLATION) - Swab, Nasopharynx.  Procedure                               Abnormality         Status                     ---------                               -----------         ------                     COVID-19,CEPHEID,JORGE IN-...[[id number]]  Normal              Final result                 Please view results for these tests on the individual orders.    COVID-19,CEPHEID,JORGE IN-HOUSE(OR EMERGENT/ADD-ON),NP SWAB IN TRANSPORT MEDIA 3-4 HR TAT - Swab, Nasopharynx [[id number]]  (Normal) Collected: 07/04/21 0302    Lab Status: Final result Specimen: Swab from Nasopharynx Updated: 07/04/21 0408     COVID19 Not Detected    Narrative:      Fact sheet for providers: https://www.fda.gov/media/078682/download      Fact sheet for patients: https://www.fda.gov/media/448206/download  Fact sheet for providers: https://www.fda.gov/media/229419/download     Fact sheet for patients: https://www.fda.gov/media/960873/download          Imaging Results (Last 24 Hours)     ** No results found for the last 24 hours. **              I have reviewed the medications:  Scheduled Meds:DAPTOmycin, 6 mg/kg (Adjusted), Intravenous, Q24H  docusate sodium, 100 mg, Oral, BID  enoxaparin, 40 mg, Subcutaneous, Daily  piperacillin-tazobactam, 4.5 g, Intravenous, Q8H  polyethylene glycol, 17 g, Oral, Daily  sodium chloride, 10 mL, Intravenous, Q12H      Continuous Infusions:sodium chloride, 100 mL/hr, Last Rate: 100 mL/hr (07/07/21 9204)      PRN Meds:.•  acetaminophen **OR** acetaminophen **OR** acetaminophen  •  bisacodyl  •  HYDROcodone-acetaminophen  •  HYDROmorphone  •  magnesium sulfate **OR** magnesium sulfate **OR** magnesium sulfate  •  ondansetron **OR** ondansetron  •  phenol  •  potassium chloride **OR** potassium chloride **OR** potassium chloride  •  Sodium Chloride (PF)  •  sodium chloride    Assessment/Plan   Assessment & Plan     Active Hospital Problems    Diagnosis  POA   • Intra-abdominal abscess post-procedure [T81.43XA]  Yes      Resolved Hospital Problems   No resolved problems to display.        Brief Hospital Course to date:  Anisa Sosa is a 31 y.o. female with history of elective laparoscopic hysterectomy in [place] on May 27.  She recently reports increased abdominal pain and anorexia as well as no fevers.  CT abdomen pelvis here showed extensive peritoneal inflammation throughout the pelvis with multiple small peripherally enhancing interloop fluid collections scattered in the low pelvis with the largest measuring 5.7 cm in the perirectal area.    Fever  Leukocytosis  Intra-abdominal abscesses  - afebrile for last 24 hours  - Leukocytosis had resolved, but now back up again,  CRP increased to 34 two days  ago  -Continue daptomycin/zosyn; consider antifugals if clinical worsening  -no discrete walled abscess amenable for drainage on 7/5 -- may need to re-image if febrile again. D/w Dr. Frias, will repeat CT scan this am and see if there any collection of fluid amenable to drainage. Not enough fluid for drainage by IR.  D/w Dr. Robertson who is planning to talk with Gyn/Onc for assistance.   -Pain control with current regimen    DVT prophylaxis: lovenox    Disposition: I expect the patient to be discharged home tbd    CODE STATUS:   Code Status and Medical Interventions:   Ordered at: 07/04/21 0418     Level Of Support Discussed With:    Patient     Code Status:    CPR     Medical Interventions (Level of Support Prior to Arrest):    Full       Maribel Varela MD  07/08/21

## 2021-07-08 NOTE — PROGRESS NOTES
INFECTIOUS DISEASE CONSULT/INITIAL HOSPITAL VISIT    Anisa Sosa  1990  [id number]    Date of Consult: 7/5/21  Admission Date: 7/3/2021      Requesting Provider: Walter Prieto MD  Evaluating Physician: Titus Frias MD    Reason for Consultation: intraabdominal abscesses after hysterectomy    History of present illness:    Patient is a 31 y.o. female with h/o STIs and abnormal uterine bleeding/uterine fibroids s/p lap vaginal-assisted hysterectomy in [place] on 5/27/21 (with ovaries retained) who followed up a week later and was placed on 5 days of Cipro for possible UTI. She did relatively well post-operatively with mild tenderness.  Last Sunday, she went to a tournament in [place] to watch her daughter and started feeling fatigued.  She slept for a couple of days, but on Tuesday, she began having severe abdominal pain in lower part of abdomen.  She has some dizziness and lightheadedness with the pain.  She went to [facility] ED on 6/30 with a CT scan at that time showing some inflammation of the pelvic area.  She was sent home on ibuprofen, pain meds, and Cipro.  She continued to have pain and pelvic pressure with no relief from medications or heating pad.  She had some difficulty breathing because of the bloating and pressure.  She had not had a BM since 6/29. She started having nausea and vomiting on 7/1.  On 7/2, she had worsening pain and came to [facility] ED on 7/3.  A CT scan here showed interloop abscesses 1 to 2 cm in sizes and a 5.7 cm abscess near the rectum.  A CT-guided aspiration of prerectal abscess was attempted today but aborted d/t the fluid collection being mobile.  Her Tmax since arrival has been 102.5.  Pertinent labs are PCT 0.3, lactic acid 0.8, WBC 14,400 with 90% neutrophils, creatinine 0.72, CRP 31.7, and UA WBC 3-5. Blood cultures are negative to date. A COVID-19 PCR was negative. She is currently on Vancomycin and Zosyn.  ID was asked to evaluate and  manage her antibiotic therapy.       Subjective:    21: The patient did have some chills last night but fevers seem to be better today.  Tmax overnight was 100.9°F.  She did have some hypokalemia to a potassium of 2.6.  Pain is well controlled but still occasionally having some left lower quadrant and central abdominal pain. Only some mild nausea that is controlled with Zofran.  No bowel movement yet.       21: Still having some lower abdominal pain but no worse.  Having nausea that is well controlled with Zofran.  Fevers have improved today with Tmax 99.7°F.  She is tolerating the antibiotics well without any adverse side effects.    21: the patient is doing ok today. Abdominal pain is no worse. Tolerating diet. No n/v or diarrhea. No fevers. CT A/P with some improvement in perirectal fluid collection but some enlargement of other fluid collections. Plan is for surgery tomorrow.     Past Medical History:   Diagnosis Date   • Anemia    • History of chlamydia    • History of trichomonal vaginitis        Past Surgical History:   Procedure Laterality Date   •  SECTION     •  SECTION     •  SECTION WITH TUBAL  2013   • LAPAROSCOPIC ASSISTED VAGINAL HYSTERECTOMY  2021    for AUB-L, pelvic pain. Ovaries retained per patient. Dr. Avani Horta.        Family History   Problem Relation Age of Onset   • Breast cancer Maternal Grandmother    • Ovarian cancer Neg Hx    • Colon cancer Neg Hx        Social History     Socioeconomic History   • Marital status: Single     Spouse name: Not on file   • Number of children: Not on file   • Years of education: Not on file   • Highest education level: Not on file   Tobacco Use   • Smoking status: Former Smoker   • Smokeless tobacco: Never Used   • Tobacco comment: off and on for 3 years    Substance and Sexual Activity   • Alcohol use: Yes     Comment: occ   • Drug use: Never       No Known Allergies      Medication:    Current  Facility-Administered Medications:   •  acetaminophen (TYLENOL) tablet 650 mg, 650 mg, Oral, Q4H PRN, 650 mg at 07/06/21 0443 **OR** acetaminophen (TYLENOL) 160 MG/5ML solution 650 mg, 650 mg, Oral, Q4H PRN **OR** acetaminophen (TYLENOL) suppository 650 mg, 650 mg, Rectal, Q4H PRN, Tierney Martinez MD  •  bisacodyl (DULCOLAX) suppository 10 mg, 10 mg, Rectal, Daily PRN, Maribel Varela MD, 10 mg at 07/07/21 1506  •  DAPTOmycin (CUBICIN) 450 mg in sodium chloride 0.9 % 50 mL IVPB, 6 mg/kg (Adjusted), Intravenous, Q24H, Girish Martin PA, Last Rate: 100 mL/hr at 07/08/21 1719, 450 mg at 07/08/21 1719  •  docusate sodium (COLACE) capsule 100 mg, 100 mg, Oral, BID, Walter Prieto MD, 100 mg at 07/07/21 2243  •  enoxaparin (LOVENOX) syringe 40 mg, 40 mg, Subcutaneous, Daily, Walter Prieto MD, Stopped at 07/08/21 1422  •  HYDROcodone-acetaminophen (NORCO) 7.5-325 MG per tablet 1 tablet, 1 tablet, Oral, Q4H PRN, Maribel Vareal MD, 1 tablet at 07/08/21 1719  •  HYDROmorphone (DILAUDID) injection 0.5 mg, 0.5 mg, Intravenous, Q2H PRN, Tierney Martinez MD, 0.5 mg at 07/08/21 1533  •  Magnesium Sulfate 2 gram Bolus, followed by 8 gram infusion (total Mg dose 10 grams)- Mg less than or equal to 1mg/dL, 2 g, Intravenous, PRN **OR** Magnesium Sulfate 2 gram / 50mL Infusion (GIVE X 3 BAGS TO EQUAL 6GM TOTAL DOSE) - Mg 1.1 - 1.5 mg/dl, 2 g, Intravenous, PRN **OR** Magnesium Sulfate 4 gram infusion- Mg 1.6-1.9 mg/dL, 4 g, Intravenous, PRN, Walter Prieto MD, Last Rate: 25 mL/hr at 07/06/21 2127, 4 g at 07/06/21 2127  •  ondansetron (ZOFRAN) tablet 4 mg, 4 mg, Oral, Q6H PRN, 4 mg at 07/08/21 1257 **OR** ondansetron (ZOFRAN) injection 4 mg, 4 mg, Intravenous, Q6H PRN, Tierney Martinez MD, 4 mg at 07/07/21 0828  •  phenol (CHLORASEPTIC) 1.4 % liquid 2 spray, 2 spray, Mouth/Throat, Q2H PRN, Corin Brannon, APRN  •  piperacillin-tazobactam (ZOSYN) 4.5 g in iso-osmotic dextrose 100 mL IVPB (premix), 4.5 g,  Intravenous, Q8H, Titus Frias MD, 4.5 g at 07/08/21 1410  •  polyethylene glycol (MIRALAX) packet 17 g, 17 g, Oral, Daily, Walter Prieto MD, 17 g at 07/07/21 0812  •  potassium chloride (MICRO-K) CR capsule 40 mEq, 40 mEq, Oral, PRN, 40 mEq at 07/07/21 0734 **OR** potassium chloride (KLOR-CON) packet 40 mEq, 40 mEq, Oral, PRN **OR** potassium chloride 10 mEq in 100 mL IVPB, 10 mEq, Intravenous, Q1H PRN, Walter Prieto MD  •  Sodium Chloride (PF) 0.9 % 10 mL, 10 mL, Intravenous, PRN, Tierney Martinez MD  •  sodium chloride 0.9 % flush 1-10 mL, 1-10 mL, Intravenous, PRN, Tierney Martinez MD, 10 mL at 07/04/21 1638  •  sodium chloride 0.9 % flush 10 mL, 10 mL, Intravenous, Q12H, Tierney Martinez MD, 10 mL at 07/08/21 1422  •  sodium chloride 0.9 % infusion, 100 mL/hr, Intravenous, Continuous, Walter Prieto MD, Last Rate: 100 mL/hr at 07/08/21 1719, 100 mL/hr at 07/08/21 1719    Antibiotics:  Anti-Infectives (From admission, onward)    Ordered     Dose/Rate Route Frequency Start Stop    07/05/21 1631  DAPTOmycin (CUBICIN) 450 mg in sodium chloride 0.9 % 50 mL IVPB     Neri Prater, [facility] reviewed the order on 07/06/21 0722.   Ordering Provider: Girish Martin PA    6 mg/kg × 75.6 kg (Adjusted)  100 mL/hr over 30 Minutes Intravenous Every 24 Hours 07/05/21 1800 07/19/21 1759    07/04/21 0423  piperacillin-tazobactam (ZOSYN) 4.5 g in iso-osmotic dextrose 100 mL IVPB (premix)     Titus Frias MD reviewed the order on 07/07/21 0851.   Ordering Provider: Titus Frias MD    4.5 g  over 4 Hours Intravenous Every 8 Hours 07/04/21 1200 07/18/21 1159    07/04/21 0423  piperacillin-tazobactam (ZOSYN) 4.5 g in iso-osmotic dextrose 100 mL IVPB (premix)     Ordering Provider: Tierney Martinez MD    4.5 g  over 30 Minutes Intravenous Once 07/04/21 0600 07/04/21 0528    07/03/21 1865  vancomycin 1750 mg/500 mL 0.9% NS IVPB ([facility])     Ordering Provider: Lito Machuca DO    20 mg/kg × 83.9  kg  over 120 Minutes Intravenous Once 21 2357 21 0250    21 2355  piperacillin-tazobactam (ZOSYN) 3.375 g in iso-osmotic dextrose 50 ml (premix)     Ordering Provider: Lito Machuca DO    3.375 g  over 30 Minutes Intravenous Once 21 23521 0140            Review of Systems:  As above    Physical Exam:   Vital Signs  Temp (24hrs), Av.3 °F (36.8 °C), Min:97.5 °F (36.4 °C), Max:98.8 °F (37.1 °C)    Temp  Min: 97.5 °F (36.4 °C)  Max: 98.8 °F (37.1 °C)  BP  Min: 108/70  Max: 127/91  Pulse  Min: 64  Max: 83  Resp  Min: 16  Max: 19  SpO2  Min: 90 %  Max: 100 %    GENERAL: Awake and alert, in no acute distress. Lying comfortably in bed. eating  HEENT: Normocephalic, atraumatic.  No labial ulcers noted  HEART: RRR; No murmur, rubs, gallops.   LUNGS: CTAB. nonlabored breathing on room air  ABDOMEN: Soft, nontender, Nondistended. No HSM  EXT:  No cyanosis, clubbing or edema. No cord.  :  Without Menon catheter.   MSK:  FROM, no joint effusions  SKIN: no new rashes noted  NEURO: Oriented to PPT. Normal speech and cognition.  PSYCHIATRIC: Normal insight and judgment. Cooperative with PE    Laboratory Data    Results from last 7 days   Lab Units 21  0704 21  0612 21  0713   WBC 10*3/mm3 15.28* 11.14* 10.84*   HEMOGLOBIN g/dL 10.1* 9.8* 10.1*   HEMATOCRIT % 31.4* 30.6* 31.1*   PLATELETS 10*3/mm3 233 184 158     Results from last 7 days   Lab Units 21  0704   SODIUM mmol/L 143   POTASSIUM mmol/L 3.8   CHLORIDE mmol/L 106   CO2 mmol/L 27.0   BUN mg/dL 4*   CREATININE mg/dL 0.62   GLUCOSE mg/dL 84   CALCIUM mg/dL 9.1     Results from last 7 days   Lab Units 21  0612   ALK PHOS U/L 69   BILIRUBIN mg/dL 0.3   ALT (SGPT) U/L 6   AST (SGOT) U/L 11         Results from last 7 days   Lab Units 21  0713   CRP mg/dL 34.97*     Results from last 7 days   Lab Units 21  2230   LACTATE mmol/L 0.8     Results from last 7 days   Lab Units 21  0756   CK  TOTAL U/L 32     Results from last 7 days   Lab Units 07/05/21  1230   VANCOMYCIN TR mcg/mL <4.00*     Estimated Creatinine Clearance: 158.8 mL/min (by C-G formula based on SCr of 0.62 mg/dL).      Microbiology:  Microbiology Results (last 10 days)     Procedure Component Value - Date/Time    MRSA Screen, PCR (Inpatient) - Swab, Nares [[id number]]  (Normal) Collected: 07/05/21 1828    Lab Status: Final result Specimen: Swab from Nares Updated: 07/05/21 2036     MRSA PCR Negative    Narrative:      MRSA Negative    COVID PRE-OP / PRE-PROCEDURE SCREENING ORDER (NO ISOLATION) - Swab, Nasopharynx [[id number]]  (Normal) Collected: 07/04/21 0302    Lab Status: Final result Specimen: Swab from Nasopharynx Updated: 07/04/21 0408    Narrative:      The following orders were created for panel order COVID PRE-OP / PRE-PROCEDURE SCREENING ORDER (NO ISOLATION) - Swab, Nasopharynx.  Procedure                               Abnormality         Status                     ---------                               -----------         ------                     COVID-19,CEPHEID,JORGE IN-...[[id number]]  Normal              Final result                 Please view results for these tests on the individual orders.    COVID-19,CEPHEID,JORGE IN-HOUSE(OR EMERGENT/ADD-ON),NP SWAB IN TRANSPORT MEDIA 3-4 HR TAT - Swab, Nasopharynx [[id number]]  (Normal) Collected: 07/04/21 0302    Lab Status: Final result Specimen: Swab from Nasopharynx Updated: 07/04/21 0408     COVID19 Not Detected    Narrative:      Fact sheet for providers: https://www.fda.gov/media/827353/download     Fact sheet for patients: https://www.fda.gov/media/943028/download  Fact sheet for providers: https://www.fda.gov/media/426288/download     Fact sheet for patients: https://www.fda.gov/media/987946/download    Blood Culture - Blood, Arm, Left [[id number]] Collected: 07/04/21 0017    Lab Status: Preliminary result Specimen: Blood from Arm, Left Updated: 07/08/21 0445     Blood Culture  No growth at 4 days    Blood Culture - Blood, Arm, Right [[id number]] Collected: 07/04/21 0017    Lab Status: Preliminary result Specimen: Blood from Arm, Right Updated: 07/08/21 0445     Blood Culture No growth at 4 days              Radiology:  Imaging Results (Last 72 Hours)     Procedure Component Value Units Date/Time    CT Abdomen Pelvis With Contrast [[id number]] Collected: 07/08/21 1159     Updated: 07/08/21 1247    Narrative:      EXAMINATION: CT ABDOMEN AND PELVIS W CONTRAST-      INDICATION: Abdominal abscess/infection suspected; T81.43XA-Infection  following a procedure, organ and space surgical site, initial encounter;  R10.30-Lower abdominal pain, unspecified; Z90.710-Acquired absence of  both cervix and uterus.     TECHNIQUE: 5 mm post IV contrast portal venous phase and delayed venous  phase images through the abdomen and pelvis.     The radiation dose reduction device was turned on for each scan per the  ALARA (As Low as Reasonably Achievable) protocol.     COMPARISON: Abdomen and pelvis CT scan 07/03/2021.     FINDINGS: Patient history indicates prior hysterectomy, the 07/03/2021  exam report indicated extensive peritoneal inflammation, multiple small  peripherally enhancing interloop fluid collections concerning for  abscess, largest 1 to 2 cm, with a pre-rectal collection just under 6  cm, subsequent aspirated under CT guidance.     Today's study shows this collection to appear very small,  nonair-containing with no obvious enhancement, 3.6 x 1.5 cm in maximal  dimensions. Left pelvic sidewall collection appears increased, from 3.1  x 2.6 m on 07/03/2021 to approximately 4.9 x 2.7 cm today. At the same  axial level, a small central pelvic fluid and air collection, 16 mm in  diameter has increased to 3.8 cm in diameter. There is a suggestion of  several other fluid collections between bowel loops at approximately the  same level in the central pelvis, but these are difficult to reliably  distinguish  from fluid-containing small bowel as is seen elsewhere. Of  these, a central low-density area in the pelvis, image 68 series 2 is  thought to represent a 26 mm collection previously 11 mm. Dorsally  adjacent collection approximately 2.7 cm appears to be increased from  1.5 cm. Neither collection contains air. Inflammatory fat stranding of  the pelvis is similar in extent to prior exam. No free fluid is seen.  Area of the vaginal cuff remains somewhat thickened but stable. No  hematoma is seen. Bladder is normally distended and normal in  appearance.     Elsewhere, there is mild new right lower lobe discoid atelectasis and  trace effusion and minimal left lower lobe discoid atelectasis. There is  extensive fatty liver change. No hepatic lesions are identified. No  significant abnormalities are seen of the spleen, pancreas, gallbladder,  adrenal glands, or kidneys. No upper abdominal free air, ascites, or  inflammatory focus is seen. Upper and midabdominal small bowel loops  appear normal.     Delayed venous phase images show good contrast opacification of normal  caliber renal collecting systems, ureters and bladder with no evidence  of obstructive uropathy. Bony structures appear grossly intact.       Impression:      1. Very small residual pre-rectal fluid collection, markedly improved  from 07/03/2021.  2. Interval enlargement of multiple other intrapelvic fluid collections  as discussed above. No clearly new fluid collection or other new  inflammatory focus is appreciated.  3. Mild new right basilar atelectasis, minimal left basilar atelectasis  and minimal pleural effusions.     D:  07/08/2021  E:  07/08/2021           I read her CT A/P from 7/8/21 and agree with the above report    Impression:   - Sepsis with fever, tachycardia, leukocytosis, and post-operative pelvic abscesses- improved  - Multiple pelvic abscess after prior hysterectomy 5/27/21.  Plan for surgery 7/9/21  - Leukocytosis/neutrophilia, slightly  worse  - Pelvic pain  - Fever, improving  - Abnormal uterine bleeding/fibroid tumors s/p hysterectomy (vaginal-assisted) with ovaries retained  -hypokalemia- ongoing but improved    PLAN/RECOMMENDATIONS:   Thank you for asking us to see Anisa Sosa, I recommend the following:  - Follow blood cultures. -no growth to date  - Continue Zosyn for empiric IA coverage  - Continue Daptomycin 6 mg/kg IV daily to empirically cover MRSA and VRE.   - CT A/P with some improvement in perirectal fluid collection but some enlargement of other fluid collections. Plan is for surgery tomorrow. Please sent cultures from the procedure        Titus Frias MD  7/8/2021  19:52 EDT

## 2021-07-08 NOTE — PAYOR COMM NOTE
"Ref # [id number]  Tressa Martin RN, BSN  Phone # [phone]  Fax # [phone]  Anisa Sosa (31 y.o. Female)     Date of Birth Social Security Number Address Home Phone MRN    1990  [address] [phone] [id number]    Zoroastrianism Marital Status          Yazidi Single       Admission Date Admission Type Admitting Provider Attending Provider Department, Room/Bed    7/3/21 Emergency Maribel Varela MD Howard, Gabriela Kirk, MD [facility] 6B, N624/1    Discharge Date Discharge Disposition Discharge Destination                       Attending Provider: Maribel Varela MD    Allergies: No Known Allergies    Isolation: None   Infection: None   Code Status: CPR    Ht: 177.8 cm (70\")   Wt: 90.3 kg (199 lb)    Admission Cmt: None   Principal Problem: None                Active Insurance as of 7/3/2021     Primary Coverage     Payor Plan Insurance Group Employer/Plan Group    [employer]HEM BLUE CROSS ANTHEM BLUE CROSS BLUE SHIELD PPO [id number]     Payor Plan Address Payor Plan Phone Number Payor Plan Fax Number Effective Dates    [address] [phone]  2019 - None Entered    [place] [address]       Subscriber Name Subscriber Birth Date Member ID       ANISA SOSA 1990 [id number]                    History & Physical      Tierney Martinez MD at 21 0401              [facility] Medicine Services  HISTORY AND PHYSICAL    Patient Name: Anisa Sosa  : 1990  MRN: [id number]  Primary Care Physician: Provider, No Known  Date of admission: 7/3/2021      Subjective   Subjective     Chief Complaint:  Lower abdominal pain.    HPI:  Anisa Sosa is a 31 y.o. female  to ED, who had laparoscopic hysterectomy done by Dr. Alvarenga (in [place]) on May 27.  Started having generalized fatigue over the past week, later on developed lower abdominal pain-constant, achy worse with any movement,, chills went " to [facility] approximately 4 days back on Wednesday-had CT abdomen done which showed inflammation as per patient, was prescribed Cipro, pain medication discharged home.  Also complains of minimal vaginal discharge for past week.  In spite of taking her medications had persistent lower abdominal pain along with chills, poor appetite, nausea without vomiting, dysuria-therefore presented to our ED.  Also constipated since last 5 days.  Tried taking stool softener without any bowel movement.    No co of orthopnea, PND, chest pain,cough, wheezing.  No co of  sore throat,   No co of bleeding per GI or .  Denies , diarrhea,   No new joint pain, or skin rash.  No focal weakness, speech changes or vision changes.      Current COVID Risks are:  [] Fever []  Cough [] Shortness of breath [] Fatigue [] Change in taste or smell    [] Exposure to COVID positive patient  [] High risk facility   []  NONE  COVID VACCINE status    The patient has a COVID HM Topic on their chart, and they are fully vaccinated.      Review of Systems   Complete ROS done, which is negative except as above and HPI.  Old records reviewed and summarized in PM hx      Personal History     History reviewed. No pertinent past medical history.        Past Surgical History:   Procedure Laterality Date   •  SECTION     • HYSTERECTOMY     • TUBAL ABDOMINAL LIGATION         Family History:   Mother-had a PPM.    Social History:  reports that she has never smoked. She has never used smokeless tobacco. She reports current alcohol use. She reports that she does not use drugs.      Medications:  Available home medication information reviewed.  No medications prior to admission.       No Known Allergies    Objective   Objective     Vital Signs:   Temp:  [98.4 °F (36.9 °C)-100.5 °F (38.1 °C)] 100.5 °F (38.1 °C)  Heart Rate:  [] 97  Resp:  [18-20] 18  BP: (114-130)/(72-91) 114/72        Physical Exam     GENERAL- Not distressed, well nourished.  RS-  CTA-BL, ,  No wheezing , no crackles, good effort.  CVS- s1s2 tachycardic, no murmur.  ABD-firm lower abdomen, tender to even superficial palpation, bowel sounds decreased  EXT- no edema.  NEURO- AAO-3, power 5/5 in all ext, no gross sensory deficit, cranial nerves intact.  EYES- Conjunctivae are normal. Pupils are equal, round, and reactive to light. No scleral icterus.   ENT- no external ear nose lesions, mucosa moist.  NECK- No JVD present. No tracheal deviation present. No thyromegaly present,No cervical lymphadenopathy.  JOINTS/MSK- no deformity, no swelling.  SKIN- no rash , warm to touch.  PSYCHIATRIC- Normal mood and affect. Behavior is normal. Thought content normal.     Results Reviewed:  I have personally reviewed current lab and radiology data.    Results from last 7 days   Lab Units 07/03/21  2230   WBC 10*3/mm3 14.40*   HEMOGLOBIN g/dL 12.2   HEMATOCRIT % 37.2   PLATELETS 10*3/mm3 175     Results from last 7 days   Lab Units 07/03/21  2230   SODIUM mmol/L 139   POTASSIUM mmol/L 3.5   CHLORIDE mmol/L 100   CO2 mmol/L 28.0   BUN mg/dL 6   CREATININE mg/dL 0.72   GLUCOSE mg/dL 99   CALCIUM mg/dL 9.6   ALT (SGPT) U/L 9   AST (SGOT) U/L 13   LACTATE mmol/L 0.8   PROCALCITONIN ng/mL 0.30*     Estimated Creatinine Clearance: 134.1 mL/min (by C-G formula based on SCr of 0.72 mg/dL).  Brief Urine Lab Results     None        Imaging Results (Last 24 Hours)     Procedure Component Value Units Date/Time    CT Abdomen Pelvis With Contrast [[id number]] Collected: 07/03/21 2352     Updated: 07/03/21 2354    Narrative:      CT ABDOMEN AND PELVIS WITH CONTRAST, 7/3/2021    HISTORY:  31-year-old female one month postop hysterectomy presenting to the ED complaining of low abdomen pain and decreased bowel movements.    TECHNIQUE:  CT imaging of the abdomen and pelvis with IV contrast. Radiation dose reduction techniques included automated exposure control. Radiation audit for CT and nuclear cardiology exams in the last 12  months: 0.    PELVIS FINDINGS:  Postoperative changes of hysterectomy. Edema and soft tissue stranding is seen throughout the pelvic mesentery, and there are multiple small, scattered rim-enhancing interloop fluid collections within the low pelvis suspicious for multifocal abscess. The  largest collection in the low prerectal midline measures about 5.7 cm (image 73). There is soft tissue thickening of the vaginal cuff, but there is no abscess or air collection in this location. There is no air within the urinary bladder. Rectum is  unremarkable. 3.1 cm left ovary cyst.    ABDOMEN FINDINGS:  Small bowel and colon are normal in caliber with no evidence of bowel obstruction or significant adynamic ileus. No fluid collections are seen within the abdominal peritoneal or retroperitoneal cavities.    Both kidneys are normal in appearance with no evidence of upper urinary tract obstruction on the right or left. Normal renal parenchymal enhancement.    No gallbladder distention or bile duct dilatation. Liver, pancreas and spleen appear normal.    Lung base images show no active disease.      Impression:      1.  Postop hysterectomy with extensive peritoneal inflammation throughout the pelvis. There are multiple small peripherally enhancing interloop fluid collections scattered within the low pelvis concerning for interloop abscesses. Most of these measure  only 1 to 2 cm. Largest low midline prerectal collection measures up to 5.7 cm.  2.  Soft tissue thickening of the vaginal cuff but no obvious evidence of cuff dehiscence. There is no abscess or free air at the cuff. No air within the bladder.  3.  No evidence of upper urinary tract obstruction.    Signer Name: William Cox MD   Signed: 7/3/2021 11:52 PM   Workstation Name: [id number]    [facility]          WBC-14, pro-Armando of 0.3,        Assessment/Plan   Active Hospital Problems    Diagnosis  POA   • Intra-abdominal abscess post-procedure  [T81.43XA]  Yes       Assessment & Plan   Lower abdominal pain-with history of recent hysterectomy-CT abdomen pelvis with contrast-shows peritoneal inflammation with interloop abscesses-the biggest one 5 cm in perirectal area.  -Likely postop abscess, septic, started on Merrem, Zosyn will continue, IV opiates for pain management.  Case discussed with Dr. Robertson-OB/GYN on-call, formal consult in a.m.,  -To be discussed with IR in a.m. if any drainage possible.  -We will order blood culture.  -Send UA.      DVT prophylaxis:    -TEDs/SCDs  -Lovenox-none in case intervention is needed.    CODE STATUS:    Code Status and Medical Interventions:   Ordered at: 07/04/21 041     Level Of Support Discussed With:    Patient     Code Status:    CPR     Medical Interventions (Level of Support Prior to Arrest):    Full         Admission Status:  I believe this patient meets inpatient criteria secondary to intra-abdominal abscess.      Electronically signed by Tierney Martinez MD at 07/04/21 0646          Emergency Department Notes      Lito Machuca DO at 07/03/21 0485            [place]    EMERGENCY DEPARTMENT ENCOUNTER      Pt Name: Anisa Sosa  MRN: [id number]  YOB: 1990  Date of evaluation: 7/3/2021  Provider: Lito Machuca DO    CHIEF COMPLAINT       Chief Complaint   Patient presents with   • Abdominal Pain         HISTORY OF PRESENT ILLNESS  (Location/Symptom, Timing/Onset, Context/Setting, Quality, Duration, Modifying Factors, Severity.)   Anisa Sosa is a 31 y.o. female who presents to the emergency department for evaluation of increasing lower abdominal pain and she is status post a hysterectomy with cervical removal secondary to enlarged uterus.  This was removed on May 27 with laparoscopic and intravaginal procedure technique.  This was done by patient's OB/GYN in [facility], Dr. Fatmata Varma.  She had been doing well up until last week started having some increasing  abdominal pain, decreased bowel movements she was seen at an outside hospital in [place] with blood work and CT scan imaging which she notes revealed some inflammatory changes around the surgical site was started on pain control, nausea control and antibiotics but the patient symptoms continue to progress.  She denies any fevers or chills, but she notes that pain even with the medications is almost unbearable.  Decreased bowel movements over the last few days as well.  No falls, injury or trauma.  She does endorse any pain with trying to use the bathroom, no other significant abdominal surgeries.  She denies any chest pain, shortness of breath.  She has no other acute systemic complaints this time.      Nursing notes were reviewed.    REVIEW OF SYSTEMS    (2-9 systems for level 4, 10 or more for level 5)   ROS:  General:  No fevers, no chills, no weakness  Cardiovascular:  No chest pain, no palpitations  Respiratory:  No shortness of breath, no cough, no wheezing  Gastrointestinal: Positive lower abdominal pain, positive nausea, no vomiting, positive constipation, no diarrhea  Musculoskeletal:  No muscle pain, no joint pain  Skin:  No rash  Neurologic:  No headache  Psychiatric:  No anxiety  Genitourinary:  + dysuria, no hematuria    Except as noted above the remainder of the review of systems was reviewed and negative.       PAST MEDICAL HISTORY   No past medical history on file.      SURGICAL HISTORY     No past surgical history on file.      CURRENT MEDICATIONS       Current Facility-Administered Medications:   •  HYDROmorphone (DILAUDID) injection 0.5 mg, 0.5 mg, Intravenous, Q30 Min PRN, Lito Machuca DO, 0.5 mg at 07/03/21 2316  •  ondansetron (ZOFRAN) injection 4 mg, 4 mg, Intravenous, Q30 Min PRN, Ltio Machuca DO, 4 mg at 07/03/21 2308  •  Sodium Chloride (PF) 0.9 % 10 mL, 10 mL, Intravenous, PRN, Lito Machuca DO  •  vancomycin 1750 mg/500 mL 0.9% NS IVPB ([facility]), 20 mg/kg,  "Intravenous, Once, Lito Machuca, , 1,750 mg at 07/04/21 0050  No current outpatient medications on file.    ALLERGIES     Patient has no known allergies.    FAMILY HISTORY     No family history on file.       SOCIAL HISTORY       Social History     Socioeconomic History   • Marital status: Single     Spouse name: Not on file   • Number of children: Not on file   • Years of education: Not on file   • Highest education level: Not on file         PHYSICAL EXAM    (up to 7 for level 4, 8 or more for level 5)     Vitals:    07/03/21 2224 07/03/21 2315 07/04/21 0000 07/04/21 0030   BP: 130/91      BP Location: Left arm      Patient Position: Sitting      Pulse: 106      Resp: 20      Temp: 98.4 °F (36.9 °C)      TempSrc: Oral      SpO2: 99% 100% 98% 99%   Weight: 83.9 kg (185 lb)      Height: 177.8 cm (70\")          Physical Exam  General : Patient is awake, alert, oriented, in a moderate painful distress, tearful during examination  HEENT: Pupils are equally round and reactive to light, EOMI, conjunctivae clear, sclerae white, there is no injection no icterus.  Oral mucosa is moist, no exudate. Uvula is midline  Neck: Neck is supple, full range of motion, trachea midline  Cardiac: Heart r tachycardic rate regular rhythm, no murmurs, rubs, or gallops  Lungs: Lungs are clear to auscultation, there is no wheezing, rhonchi, or rales. There is no use of accessory muscles  Chest wall: There is no tenderness to palpation over the chest wall or over ribs  Abdomen: Abdomen is soft, nondistended.  There is discomfort diffusely throughout the abdomen periumbilical and lower abdominal region with subjective guarding.  Bowel sounds are present but hypoactive.  Musculoskeletal: 5 out of 5 strength in all 4 extremities.  No focal muscle deficits are appreciated  Neuro: Motor intact, sensory intact, level of consciousness is normal  Dermatology: Skin is warm and dry  Psych: Mentation is grossly normal, cognition is grossly " normal. Affect is tearful, anxious      DIAGNOSTIC RESULTS     EKG: All EKG's are interpreted by the Emergency Department Physician who either signs or Co-signs this chart in the absence of a cardiologist.    No orders to display       RADIOLOGY:   Non-plain film images such as CT, Ultrasound and MRI are read by the radiologist. Plain radiographic images are visualized and preliminarily interpreted by the emergency physician with the below findings:      [] Radiologist's Report Reviewed:  CT Abdomen Pelvis With Contrast   Final Result   1.  Postop hysterectomy with extensive peritoneal inflammation throughout the pelvis. There are multiple small peripherally enhancing interloop fluid collections scattered within the low pelvis concerning for interloop abscesses. Most of these measure   only 1 to 2 cm. Largest low midline prerectal collection measures up to 5.7 cm.   2.  Soft tissue thickening of the vaginal cuff but no obvious evidence of cuff dehiscence. There is no abscess or free air at the cuff. No air within the bladder.   3.  No evidence of upper urinary tract obstruction.      Signer Name: William Cox MD    Signed: 7/3/2021 11:52 PM    Workstation Name: [email or web address]     [facility]            ED BEDSIDE ULTRASOUND:   Performed by ED Physician - none    LABS:    I have reviewed and interpreted all of the currently available lab results from this visit (if applicable):  Results for orders placed or performed during the hospital encounter of 07/03/21   Comprehensive Metabolic Panel    Specimen: Blood   Result Value Ref Range    Glucose 99 65 - 99 mg/dL    BUN 6 6 - 20 mg/dL    Creatinine 0.72 0.57 - 1.00 mg/dL    Sodium 139 136 - 145 mmol/L    Potassium 3.5 3.5 - 5.2 mmol/L    Chloride 100 98 - 107 mmol/L    CO2 28.0 22.0 - 29.0 mmol/L    Calcium 9.6 8.6 - 10.5 mg/dL    Total Protein 7.2 6.0 - 8.5 g/dL    Albumin 3.60 3.50 - 5.20 g/dL    ALT (SGPT) 9 1 - 33 U/L    AST (SGOT) 13 1  - 32 U/L    Alkaline Phosphatase 77 39 - 117 U/L    Total Bilirubin 1.0 0.0 - 1.2 mg/dL    eGFR  African Amer 114 >60 mL/min/1.73    Globulin 3.6 gm/dL    A/G Ratio 1.0 g/dL    BUN/Creatinine Ratio 8.3 7.0 - 25.0    Anion Gap 11.0 5.0 - 15.0 mmol/L   Lipase    Specimen: Blood   Result Value Ref Range    Lipase 11 (L) 13 - 60 U/L   CBC Auto Differential    Specimen: Blood   Result Value Ref Range    WBC 14.40 (H) 3.40 - 10.80 10*3/mm3    RBC 3.98 3.77 - 5.28 10*6/mm3    Hemoglobin 12.2 12.0 - 15.9 g/dL    Hematocrit 37.2 34.0 - 46.6 %    MCV 93.5 79.0 - 97.0 fL    MCH 30.7 26.6 - 33.0 pg    MCHC 32.8 31.5 - 35.7 g/dL    RDW 12.4 12.3 - 15.4 %    RDW-SD 43.0 37.0 - 54.0 fl    MPV 11.2 6.0 - 12.0 fL    Platelets 175 140 - 450 10*3/mm3    Neutrophil % 89.7 (H) 42.7 - 76.0 %    Lymphocyte % 3.3 (L) 19.6 - 45.3 %    Monocyte % 4.9 (L) 5.0 - 12.0 %    Eosinophil % 1.3 0.3 - 6.2 %    Basophil % 0.2 0.0 - 1.5 %    Immature Grans % 0.6 (H) 0.0 - 0.5 %    Neutrophils, Absolute 12.91 (H) 1.70 - 7.00 10*3/mm3    Lymphocytes, Absolute 0.48 (L) 0.70 - 3.10 10*3/mm3    Monocytes, Absolute 0.71 0.10 - 0.90 10*3/mm3    Eosinophils, Absolute 0.18 0.00 - 0.40 10*3/mm3    Basophils, Absolute 0.03 0.00 - 0.20 10*3/mm3    Immature Grans, Absolute 0.09 (H) 0.00 - 0.05 10*3/mm3    nRBC 0.0 0.0 - 0.2 /100 WBC   Lactic Acid, Plasma    Specimen: Blood   Result Value Ref Range    Lactate 0.8 0.5 - 2.0 mmol/L   Procalcitonin    Specimen: Blood   Result Value Ref Range    Procalcitonin 0.30 (H) 0.00 - 0.25 ng/mL   Green Top (Gel)   Result Value Ref Range    Extra Tube Hold for add-ons.    Lavender Top   Result Value Ref Range    Extra Tube hold for add-on    Gold Top - SST   Result Value Ref Range    Extra Tube Hold for add-ons.         All other labs were within normal range or not returned as of this dictation.      EMERGENCY DEPARTMENT COURSE and DIFFERENTIAL DIAGNOSIS/MDM:   Vitals:    Vitals:    07/03/21 2224 07/03/21 2315 07/04/21 0000  "07/04/21 0030   BP: 130/91      BP Location: Left arm      Patient Position: Sitting      Pulse: 106      Resp: 20      Temp: 98.4 °F (36.9 °C)      TempSrc: Oral      SpO2: 99% 100% 98% 99%   Weight: 83.9 kg (185 lb)      Height: 177.8 cm (70\")               Patient with a recent hysterectomy who has had increasing abdominal pain for last 1 week.  She is deafly tender on palpation, she is anxious and tearful secondary to the continued pain.  She has been on IV antibiotics and pain control over the last few days but her symptoms have continued to progress.  With this continued progression and per significant amount of discomfort we did obtain IV, labs, CT scan imaging and repeat blood work.  Results reviewed as above.  She does have a leukocytosis, left shift, the patient was placed on broad-spectrum antibiotics.  CT scan imaging reveals multiple small inflammatory infectious areas with multiple abscesses around the area of her surgical intervention.  With these findings I did attempt to call the patient's OB/GYN surgeon Dr. Fatmata Varma.  Left few voicemail messages to update her on the current findings and the plan of care moving forward.  Her contact information and cell phone number is [phone].  Patient was updated on the current plan of care and findings, she would like to stay at our facility for further work-up and treatment, understanding that her surgeon is at a different facility.  Will likely need to be followed closely if there is need for other intervention with either our OB/GYN's or back with her surgical specialist at [facility].  Case was discussed with our hospitalist team for admission.          MEDICATIONS ADMINISTERED IN ED:  Medications   Sodium Chloride (PF) 0.9 % 10 mL (has no administration in time range)   ondansetron (ZOFRAN) injection 4 mg (4 mg Intravenous Given 7/3/21 2308)   HYDROmorphone (DILAUDID) injection 0.5 mg (0.5 mg Intravenous Given 7/3/21 2316)   vancomycin 1750 " mg/500 mL 0.9% NS IVPB ([facility]) (1,750 mg Intravenous New Bag 7/4/21 0050)   sodium chloride 0.9 % bolus 1,000 mL (1,000 mL Intravenous New Bag 7/3/21 2306)   iopamidol (ISOVUE-300) 61 % injection 100 mL (100 mL Intravenous Given 7/3/21 2333)   piperacillin-tazobactam (ZOSYN) 3.375 g in iso-osmotic dextrose 50 ml (premix) (3.375 g Intravenous New Bag 7/4/21 0027)       PROCEDURES:  Procedures    CRITICAL CARE TIME    Total Critical Care time was 0 minutes, excluding separately reportable procedures.   There was a high probability of clinically significant/life threatening deterioration in the patient's condition which required my urgent intervention.      FINAL IMPRESSION      1. Intra-abdominal abscess post-procedure    2. Lower abdominal pain    3. Status post hysterectomy          DISPOSITION/PLAN     ED Disposition     ED Disposition Condition Comment    Decision to Admit              Comment: Please note this report has been produced using speech recognition software.      Lito Machuca DO  Attending Emergency Physician               Lito Machuca DO  07/04/21 0133      Electronically signed by Lito Machuca DO at 07/04/21 0133         Current Facility-Administered Medications   Medication Dose Route Frequency Provider Last Rate Last Admin   • acetaminophen (TYLENOL) tablet 650 mg  650 mg Oral Q4H PRN Tierney Martinez MD   650 mg at 07/06/21 0443    Or   • acetaminophen (TYLENOL) 160 MG/5ML solution 650 mg  650 mg Oral Q4H PRN Tierney Martinez MD        Or   • acetaminophen (TYLENOL) suppository 650 mg  650 mg Rectal Q4H PRN Tierney Martinez MD       • bisacodyl (DULCOLAX) suppository 10 mg  10 mg Rectal Daily PRN Maribel Varela MD   10 mg at 07/07/21 1506   • DAPTOmycin (CUBICIN) 450 mg in sodium chloride 0.9 % 50 mL IVPB  6 mg/kg (Adjusted) Intravenous Q24H Girish Martin  mL/hr at 07/07/21 1820 450 mg at 07/07/21 1820   • docusate sodium (COLACE) capsule 100 mg  100 mg  Oral BID Walter Prieto MD   100 mg at 07/07/21 2243   • enoxaparin (LOVENOX) syringe 40 mg  40 mg Subcutaneous Daily Walter Prieto MD   40 mg at 07/07/21 0812   • HYDROcodone-acetaminophen (NORCO) 7.5-325 MG per tablet 1 tablet  1 tablet Oral Q4H PRN Maribel Varela MD   1 tablet at 07/08/21 1257   • HYDROmorphone (DILAUDID) injection 0.5 mg  0.5 mg Intravenous Q2H PRN Tierney Martinez MD   0.5 mg at 07/08/21 0522   • Magnesium Sulfate 2 gram Bolus, followed by 8 gram infusion (total Mg dose 10 grams)- Mg less than or equal to 1mg/dL  2 g Intravenous PRN Walter Prieto MD        Or   • Magnesium Sulfate 2 gram / 50mL Infusion (GIVE X 3 BAGS TO EQUAL 6GM TOTAL DOSE) - Mg 1.1 - 1.5 mg/dl  2 g Intravenous PRN Walter Prieto MD        Or   • Magnesium Sulfate 4 gram infusion- Mg 1.6-1.9 mg/dL  4 g Intravenous PRN Walter Prieto MD 25 mL/hr at 07/06/21 2127 4 g at 07/06/21 2127   • ondansetron (ZOFRAN) tablet 4 mg  4 mg Oral Q6H PRN Tierney Martinez MD   4 mg at 07/08/21 1257    Or   • ondansetron (ZOFRAN) injection 4 mg  4 mg Intravenous Q6H PRN Tierney Martinez MD   4 mg at 07/07/21 0828   • phenol (CHLORASEPTIC) 1.4 % liquid 2 spray  2 spray Mouth/Throat Q2H PRN Corin Brannon APRN       • piperacillin-tazobactam (ZOSYN) 4.5 g in iso-osmotic dextrose 100 mL IVPB (premix)  4.5 g Intravenous Q8H Titus Frias MD   4.5 g at 07/08/21 1410   • polyethylene glycol (MIRALAX) packet 17 g  17 g Oral Daily Walter Prieto MD   17 g at 07/07/21 0812   • potassium chloride (MICRO-K) CR capsule 40 mEq  40 mEq Oral PRN Walter Prieto MD   40 mEq at 07/07/21 0734    Or   • potassium chloride (KLOR-CON) packet 40 mEq  40 mEq Oral PRN Walter Prieto MD        Or   • potassium chloride 10 mEq in 100 mL IVPB  10 mEq Intravenous Q1H PRN Walter Prieto MD       • Sodium Chloride (PF) 0.9 % 10 mL  10 mL Intravenous PRN Tierney Martinez MD       • sodium chloride 0.9 % flush 1-10 mL  1-10 mL Intravenous  PRN Tierney Martinez MD   10 mL at 07/04/21 1638   • sodium chloride 0.9 % flush 10 mL  10 mL Intravenous Q12H Tierney Martinez MD   10 mL at 07/07/21 0812   • sodium chloride 0.9 % infusion  100 mL/hr Intravenous Continuous Walter Prieto  mL/hr at 07/07/21 2154 100 mL/hr at 07/07/21 2154     Lab Results (last 72 hours)     Procedure Component Value Units Date/Time    Basic Metabolic Panel [[id number]]  (Abnormal) Collected: 07/08/21 0704    Specimen: Blood Updated: 07/08/21 0825     Glucose 84 mg/dL      BUN 4 mg/dL      Creatinine 0.62 mg/dL      Sodium 143 mmol/L      Potassium 3.8 mmol/L      Comment: Slight hemolysis detected by analyzer. Results may be affected.        Chloride 106 mmol/L      CO2 27.0 mmol/L      Calcium 9.1 mg/dL      eGFR  African Amer 135 mL/min/1.73      BUN/Creatinine Ratio 6.5     Anion Gap 10.0 mmol/L     Narrative:      GFR Normal >60  Chronic Kidney Disease <60  Kidney Failure <15      CBC (No Diff) [[id number]]  (Abnormal) Collected: 07/08/21 0704    Specimen: Blood Updated: 07/08/21 0738     WBC 15.28 10*3/mm3      RBC 3.29 10*6/mm3      Hemoglobin 10.1 g/dL      Hematocrit 31.4 %      MCV 95.4 fL      MCH 30.7 pg      MCHC 32.2 g/dL      RDW 12.7 %      RDW-SD 44.1 fl      MPV 10.8 fL      Platelets 233 10*3/mm3     Blood Culture - Blood, Arm, Left [[id number]] Collected: 07/04/21 0017    Specimen: Blood from Arm, Left Updated: 07/08/21 0445     Blood Culture No growth at 4 days    Blood Culture - Blood, Arm, Right [[id number]] Collected: 07/04/21 0017    Specimen: Blood from Arm, Right Updated: 07/08/21 0445     Blood Culture No growth at 4 days    Potassium [[id number]]  (Normal) Collected: 07/07/21 2335    Specimen: Blood Updated: 07/08/21 0033     Potassium 3.7 mmol/L     Magnesium [[id number]]  (Normal) Collected: 07/07/21 0612    Specimen: Blood Updated: 07/07/21 0835     Magnesium 2.4 mg/dL     Comprehensive Metabolic Panel [[id number]]  (Abnormal) Collected: 07/07/21  0612    Specimen: Blood Updated: 07/07/21 0657     Glucose 121 mg/dL      BUN 3 mg/dL      Creatinine 0.63 mg/dL      Sodium 135 mmol/L      Potassium 3.3 mmol/L      Chloride 98 mmol/L      CO2 29.0 mmol/L      Calcium 8.6 mg/dL      Total Protein 5.6 g/dL      Albumin 2.60 g/dL      ALT (SGPT) 6 U/L      AST (SGOT) 11 U/L      Alkaline Phosphatase 69 U/L      Total Bilirubin 0.3 mg/dL      eGFR  African Amer 133 mL/min/1.73      Globulin 3.0 gm/dL      A/G Ratio 0.9 g/dL      BUN/Creatinine Ratio 4.8     Anion Gap 8.0 mmol/L     Narrative:      GFR Normal >60  Chronic Kidney Disease <60  Kidney Failure <15      CBC & Differential [[id number]]  (Abnormal) Collected: 07/07/21 0612    Specimen: Blood Updated: 07/07/21 0632    Narrative:      The following orders were created for panel order CBC & Differential.  Procedure                               Abnormality         Status                     ---------                               -----------         ------                     CBC Auto Differential[[id number]]        Abnormal            Final result                 Please view results for these tests on the individual orders.    CBC Auto Differential [[id number]]  (Abnormal) Collected: 07/07/21 0612    Specimen: Blood Updated: 07/07/21 0632     WBC 11.14 10*3/mm3      RBC 3.19 10*6/mm3      Hemoglobin 9.8 g/dL      Hematocrit 30.6 %      MCV 95.9 fL      MCH 30.7 pg      MCHC 32.0 g/dL      RDW 12.6 %      RDW-SD 44.4 fl      MPV 10.8 fL      Platelets 184 10*3/mm3      Neutrophil % 83.8 %      Lymphocyte % 9.1 %      Monocyte % 5.2 %      Eosinophil % 1.0 %      Basophil % 0.4 %      Immature Grans % 0.5 %      Neutrophils, Absolute 9.34 10*3/mm3      Lymphocytes, Absolute 1.01 10*3/mm3      Monocytes, Absolute 0.58 10*3/mm3      Eosinophils, Absolute 0.11 10*3/mm3      Basophils, Absolute 0.04 10*3/mm3      Immature Grans, Absolute 0.06 10*3/mm3      nRBC 0.0 /100 WBC     Potassium [[id number]]  (Abnormal)  Collected: 07/07/21 0035    Specimen: Blood Updated: 07/07/21 0202     Potassium 3.4 mmol/L     Magnesium [[id number]]  (Normal) Collected: 07/06/21 0713    Specimen: Blood Updated: 07/06/21 0916     Magnesium 1.6 mg/dL     Comprehensive Metabolic Panel [[id number]]  (Abnormal) Collected: 07/06/21 0713    Specimen: Blood Updated: 07/06/21 0841     Glucose 98 mg/dL      BUN 2 mg/dL      Creatinine 0.62 mg/dL      Sodium 137 mmol/L      Potassium 2.6 mmol/L      Comment: Slight hemolysis detected by analyzer. Results may be affected.        Chloride 98 mmol/L      CO2 31.0 mmol/L      Calcium 8.7 mg/dL      Total Protein 5.8 g/dL      Albumin 2.70 g/dL      ALT (SGPT) 6 U/L      AST (SGOT) 13 U/L      Alkaline Phosphatase 76 U/L      Total Bilirubin 0.4 mg/dL      eGFR  African Amer 135 mL/min/1.73      Globulin 3.1 gm/dL      A/G Ratio 0.9 g/dL      BUN/Creatinine Ratio 3.2     Anion Gap 8.0 mmol/L     Narrative:      GFR Normal >60  Chronic Kidney Disease <60  Kidney Failure <15      C-reactive Protein [[id number]]  (Abnormal) Collected: 07/06/21 0713    Specimen: Blood Updated: 07/06/21 0831     C-Reactive Protein 34.97 mg/dL     CBC (No Diff) [[id number]]  (Abnormal) Collected: 07/06/21 0713    Specimen: Blood Updated: 07/06/21 0800     WBC 10.84 10*3/mm3      RBC 3.28 10*6/mm3      Hemoglobin 10.1 g/dL      Hematocrit 31.1 %      MCV 94.8 fL      MCH 30.8 pg      MCHC 32.5 g/dL      RDW 12.6 %      RDW-SD 43.9 fl      MPV 10.8 fL      Platelets 158 10*3/mm3     MRSA Screen, PCR (Inpatient) - Swab, Nares [[id number]]  (Normal) Collected: 07/05/21 1828    Specimen: Swab from Nares Updated: 07/05/21 2036     MRSA PCR Negative    Narrative:      MRSA Negative          Imaging Results (Last 72 Hours)     Procedure Component Value Units Date/Time    CT Abdomen Pelvis With Contrast [[id number]] Collected: 07/08/21 1159     Updated: 07/08/21 1247    Narrative:      EXAMINATION: CT ABDOMEN AND PELVIS W CONTRAST-       INDICATION: Abdominal abscess/infection suspected; T81.43XA-Infection  following a procedure, organ and space surgical site, initial encounter;  R10.30-Lower abdominal pain, unspecified; Z90.710-Acquired absence of  both cervix and uterus.     TECHNIQUE: 5 mm post IV contrast portal venous phase and delayed venous  phase images through the abdomen and pelvis.     The radiation dose reduction device was turned on for each scan per the  ALARA (As Low as Reasonably Achievable) protocol.     COMPARISON: Abdomen and pelvis CT scan 07/03/2021.     FINDINGS: Patient history indicates prior hysterectomy, the 07/03/2021  exam report indicated extensive peritoneal inflammation, multiple small  peripherally enhancing interloop fluid collections concerning for  abscess, largest 1 to 2 cm, with a pre-rectal collection just under 6  cm, subsequent aspirated under CT guidance.     Today's study shows this collection to appear very small,  nonair-containing with no obvious enhancement, 3.6 x 1.5 cm in maximal  dimensions. Left pelvic sidewall collection appears increased, from 3.1  x 2.6 m on 07/03/2021 to approximately 4.9 x 2.7 cm today. At the same  axial level, a small central pelvic fluid and air collection, 16 mm in  diameter has increased to 3.8 cm in diameter. There is a suggestion of  several other fluid collections between bowel loops at approximately the  same level in the central pelvis, but these are difficult to reliably  distinguish from fluid-containing small bowel as is seen elsewhere. Of  these, a central low-density area in the pelvis, image 68 series 2 is  thought to represent a 26 mm collection previously 11 mm. Dorsally  adjacent collection approximately 2.7 cm appears to be increased from  1.5 cm. Neither collection contains air. Inflammatory fat stranding of  the pelvis is similar in extent to prior exam. No free fluid is seen.  Area of the vaginal cuff remains somewhat thickened but stable. No  hematoma  is seen. Bladder is normally distended and normal in  appearance.     Elsewhere, there is mild new right lower lobe discoid atelectasis and  trace effusion and minimal left lower lobe discoid atelectasis. There is  extensive fatty liver change. No hepatic lesions are identified. No  significant abnormalities are seen of the spleen, pancreas, gallbladder,  adrenal glands, or kidneys. No upper abdominal free air, ascites, or  inflammatory focus is seen. Upper and midabdominal small bowel loops  appear normal.     Delayed venous phase images show good contrast opacification of normal  caliber renal collecting systems, ureters and bladder with no evidence  of obstructive uropathy. Bony structures appear grossly intact.       Impression:      1. Very small residual pre-rectal fluid collection, markedly improved  from 2021.  2. Interval enlargement of multiple other intrapelvic fluid collections  as discussed above. No clearly new fluid collection or other new  inflammatory focus is appreciated.  3. Mild new right basilar atelectasis, minimal left basilar atelectasis  and minimal pleural effusions.     D:  2021  E:  2021           Operative/Procedure Notes (last 72 hours) (Notes from 21 1417 through 21 1417)    No notes of this type exist for this encounter.            Physician Progress Notes (last 72 hours) (Notes from 21 1417 through 21 1417)      Amee Robertson MD at 21 1401          [facility]  : 1990  MRN: [id number]  CSN: [id number]    Hospital Day # 5    POD#42 s/p LAVH/BS at outside hospital   CC: hospital follow up of post op pelvic abscesses  Subjective   Patient continues to report abdominal pain and is requiring IV and oral narcotics.  IR was unable to drain anything today on repeat CT imaging.  Patient is tolerating a small amount of diet.  No fevers overnight.    Objective     Min/max vitals past 24 hours:   Temp  Min: 97.5 °F  (36.4 °C)  Max: 98.9 °F (37.2 °C)  BP  Min: 108/70  Max: 111/90  Pulse  Min: 64  Max: 83  Resp  Min: 17  Max: 19        I/O last 3 completed shifts:  In: 2938 [P.O.:1838; I.V.:1000; IV Piggyback:100]  Out: 800 [Urine:800]    General: well developed; well nourished  no acute distress   Abdomen: no umbilical or inguinal hernias are present  no hepato-splenomegaly  soft, mildly TTP throughout lower abdomen    Pelvic: Not performed   Ext: Calves NT     BMP:   Lab Results   Component Value Date     07/08/2021    K 3.8 07/08/2021     07/08/2021    CO2 27.0 07/08/2021    BUN 4 (L) 07/08/2021    CREATININE 0.62 07/08/2021     CBC w/ diff:   Lab Results   Component Value Date     07/08/2021    HGB 10.1 (L) 07/08/2021    HCT 31.4 (L) 07/08/2021    MCV 95.4 07/08/2021    RDW 12.7 07/08/2021    WBC 15.28 (H) 07/08/2021    NEUTRORELPCT 83.8 (H) 07/07/2021    AUTOIGPER 0.5 07/07/2021    LYMPHORELPCT 9.1 (L) 07/07/2021    MONORELPCT 5.2 07/07/2021    EOSRELPCT 1.0 07/07/2021    BASORELPCT 0.4 07/07/2021     CMP:   Lab Results   Component Value Date     07/08/2021    K 3.8 07/08/2021     07/08/2021    CO2 27.0 07/08/2021    BUN 4 (L) 07/08/2021    CREATININE 0.62 07/08/2021    GLUCOSE 84 07/08/2021    ALBUMIN 2.60 (L) 07/07/2021    CALCIUM 9.1 07/08/2021    AST 11 07/07/2021    ALT 6 07/07/2021    BILITOT 0.3 07/07/2021         Assessment   1. POD#42 s/p LAVH/BS for AUB-L, pelvic pain per patient.   2. Post operative pelvic abscesses     Plan   1. Discussed case with Dr. Macedo with GYN oncology and we plan to take the patient to the operating room tomorrow for a diagnostic laparoscopy washout plus minus oophorectomy if indicated.  Plan of care discussed in detail with patient and her mother and they verbalized understanding. patient to be n.p.o. after midnight    Amee Robertson MD  7/8/2021  14:01 EDT              Electronically signed by Amee Robertson MD at 07/08/21 3073     Maribel Varela  MD Bubba at 21 0900              [facility] Medicine Services  PROGRESS NOTE    Patient Name: Anisa Sosa  : 1990  MRN: [id number]    Date of Admission: 7/3/2021  Primary Care Physician: Provider, No Known    Subjective   Subjective     CC:  Abdominal pain    HPI:   Pt complains of pressure especially when she is voiding or when she had a BM yesterday. No fevers.       ROS:  Gen- no fevers, chills  CV- No chest pain, palpitations  Resp- No cough, dyspnea  GI- + abd pain        Objective   Objective     Vital Signs:   Temp:  [97.5 °F (36.4 °C)-99 °F (37.2 °C)] 98.8 °F (37.1 °C)  Heart Rate:  [64-83] 64  Resp:  [17-19] 19  BP: (108-111)/(70-90) 108/70        Physical Exam:  Constitutional - no acute distress, nontoxic, in bed  HEENT-NCAT, mucous membranes moist  CV-RRR, S1 S2 normal, no m/r/g  Resp-CTAB, no wheezes, rhonchi or rales  Abd-soft, mild lower quadrant tenderness, nondistended, normoactive bowel sounds  Ext-No lower extremity cyanosis, clubbing or edema bilaterally  Neuro-alert and oriented x 3, speech clear, moves all extremities   Psych-normal affect   Skin- No rash on exposed UE or LE bilaterally    Results Reviewed:  Results from last 7 days   Lab Units 21  0704 21  0612 21  0713 21  0756 21  0518 21  2230   WBC 10*3/mm3 15.28* 11.14* 10.84* 10.08  --  14.40*   HEMOGLOBIN g/dL 10.1* 9.8* 10.1* 11.0*  --  12.2   HEMATOCRIT % 31.4* 30.6* 31.1* 33.5*  --  37.2   PLATELETS 10*3/mm3 233 184 158 170  --  175   INR   --   --   --   --  1.22*  --    PROCALCITONIN ng/mL  --   --   --  0.21  --  0.30*     Results from last 7 days   Lab Units 21  0704 21  2335 21  0612 21  0713 21  2230   SODIUM mmol/L 143  --  135* 137 139   POTASSIUM mmol/L 3.8 3.7 3.3* 2.6* 3.5   CHLORIDE mmol/L 106  --  98 98 100   CO2 mmol/L 27.0  --  29.0 31.0* 28.0   BUN mg/dL 4*  --  3* 2* 6   CREATININE mg/dL 0.62  --  0.63 0.62  0.72   GLUCOSE mg/dL 84  --  121* 98 99   CALCIUM mg/dL 9.1  --  8.6 8.7 9.6   ALT (SGPT) U/L  --   --  6 6 9   AST (SGOT) U/L  --   --  11 13 13     Estimated Creatinine Clearance: 158.8 mL/min (by C-G formula based on SCr of 0.62 mg/dL).    Microbiology Results Abnormal     Procedure Component Value - Date/Time    Blood Culture - Blood, Arm, Left [[id number]] Collected: 07/04/21 0017    Lab Status: Preliminary result Specimen: Blood from Arm, Left Updated: 07/08/21 0445     Blood Culture No growth at 4 days    Blood Culture - Blood, Arm, Right [[id number]] Collected: 07/04/21 0017    Lab Status: Preliminary result Specimen: Blood from Arm, Right Updated: 07/08/21 0445     Blood Culture No growth at 4 days    MRSA Screen, PCR (Inpatient) - Swab, Nares [[id number]]  (Normal) Collected: 07/05/21 1828    Lab Status: Final result Specimen: Swab from Nares Updated: 07/05/21 2036     MRSA PCR Negative    Narrative:      MRSA Negative    COVID PRE-OP / PRE-PROCEDURE SCREENING ORDER (NO ISOLATION) - Swab, Nasopharynx [[id number]]  (Normal) Collected: 07/04/21 0302    Lab Status: Final result Specimen: Swab from Nasopharynx Updated: 07/04/21 0408    Narrative:      The following orders were created for panel order COVID PRE-OP / PRE-PROCEDURE SCREENING ORDER (NO ISOLATION) - Swab, Nasopharynx.  Procedure                               Abnormality         Status                     ---------                               -----------         ------                     COVID-19,CEPHEID,JORGE IN-...[[id number]]  Normal              Final result                 Please view results for these tests on the individual orders.    COVID-19,CEPHEID,JORGE IN-HOUSE(OR EMERGENT/ADD-ON),NP SWAB IN TRANSPORT MEDIA 3-4 HR TAT - Swab, Nasopharynx [[id number]]  (Normal) Collected: 07/04/21 0302    Lab Status: Final result Specimen: Swab from Nasopharynx Updated: 07/04/21 0408     COVID19 Not Detected    Narrative:      Fact sheet for providers:  https://www.fda.gov/media/413400/download     Fact sheet for patients: https://www.fda.gov/media/649083/download  Fact sheet for providers: https://www.fda.gov/media/330203/download     Fact sheet for patients: https://www.fda.gov/media/445326/download          Imaging Results (Last 24 Hours)     ** No results found for the last 24 hours. **              I have reviewed the medications:  Scheduled Meds:DAPTOmycin, 6 mg/kg (Adjusted), Intravenous, Q24H  docusate sodium, 100 mg, Oral, BID  enoxaparin, 40 mg, Subcutaneous, Daily  piperacillin-tazobactam, 4.5 g, Intravenous, Q8H  polyethylene glycol, 17 g, Oral, Daily  sodium chloride, 10 mL, Intravenous, Q12H      Continuous Infusions:sodium chloride, 100 mL/hr, Last Rate: 100 mL/hr (07/07/21 2742)      PRN Meds:.•  acetaminophen **OR** acetaminophen **OR** acetaminophen  •  bisacodyl  •  HYDROcodone-acetaminophen  •  HYDROmorphone  •  magnesium sulfate **OR** magnesium sulfate **OR** magnesium sulfate  •  ondansetron **OR** ondansetron  •  phenol  •  potassium chloride **OR** potassium chloride **OR** potassium chloride  •  Sodium Chloride (PF)  •  sodium chloride    Assessment/Plan   Assessment & Plan     Active Hospital Problems    Diagnosis  POA   • Intra-abdominal abscess post-procedure [T81.43XA]  Yes      Resolved Hospital Problems   No resolved problems to display.        Brief Hospital Course to date:  Anisa Sosa is a 31 y.o. female with history of elective laparoscopic hysterectomy in [place] on May 27.  She recently reports increased abdominal pain and anorexia as well as no fevers.  CT abdomen pelvis here showed extensive peritoneal inflammation throughout the pelvis with multiple small peripherally enhancing interloop fluid collections scattered in the low pelvis with the largest measuring 5.7 cm in the perirectal area.    Fever  Leukocytosis  Intra-abdominal abscesses  - afebrile for last 24 hours  - Leukocytosis had resolved, but now back up  again,  CRP increased to 34 two days ago  -Continue daptomycin/zosyn; consider antifugals if clinical worsening  -no discrete walled abscess amenable for drainage on 7/5 -- may need to re-image if febrile again. D/w Dr. Frias, will repeat CT scan this am and see if there any collection of fluid amenable to drainage. Not enough fluid for drainage by IR.  D/w Dr. Robertson who is planning to talk with Gyn/Onc for assistance.   -Pain control with current regimen    DVT prophylaxis: lovenox    Disposition: I expect the patient to be discharged home tbd    CODE STATUS:   Code Status and Medical Interventions:   Ordered at: 07/04/21 0413     Level Of Support Discussed With:    Patient     Code Status:    CPR     Medical Interventions (Level of Support Prior to Arrest):    Full       Maribel Varela MD  07/08/21                Electronically signed by Maribel Varela MD at 07/08/21 4279     Titus Frias MD at 07/07/21 4559          INFECTIOUS DISEASE CONSULT/INITIAL HOSPITAL VISIT    Anisa Sosa  1990  [id number]    Date of Consult: 7/5/21  Admission Date: 7/3/2021      Requesting Provider: Walter Prieto MD  Evaluating Physician: Titus Frias MD    Reason for Consultation: intraabdominal abscesses after hysterectomy    History of present illness:    Patient is a 31 y.o. female with h/o STIs and abnormal uterine bleeding/uterine fibroids s/p lap vaginal-assisted hysterectomy in [place] on 5/27/21 (with ovaries retained) who followed up a week later and was placed on 5 days of Cipro for possible UTI. She did relatively well post-operatively with mild tenderness.  Last Sunday, she went to a tournament in [place] to watch her daughter and started feeling fatigued.  She slept for a couple of days, but on Tuesday, she began having severe abdominal pain in lower part of abdomen.  She has some dizziness and lightheadedness with the pain.  She went to [facility] ED on 6/30  with a CT scan at that time showing some inflammation of the pelvic area.  She was sent home on ibuprofen, pain meds, and Cipro.  She continued to have pain and pelvic pressure with no relief from medications or heating pad.  She had some difficulty breathing because of the bloating and pressure.  She had not had a BM since . She started having nausea and vomiting on .  On , she had worsening pain and came to [facility] ED on 7/3.  A CT scan here showed interloop abscesses 1 to 2 cm in sizes and a 5.7 cm abscess near the rectum.  A CT-guided aspiration of prerectal abscess was attempted today but aborted d/t the fluid collection being mobile.  Her Tmax since arrival has been 102.5.  Pertinent labs are PCT 0.3, lactic acid 0.8, WBC 14,400 with 90% neutrophils, creatinine 0.72, CRP 31.7, and UA WBC 3-5. Blood cultures are negative to date. A COVID-19 PCR was negative. She is currently on Vancomycin and Zosyn.  ID was asked to evaluate and manage her antibiotic therapy.       Subjective:    21: The patient did have some chills last night but fevers seem to be better today.  Tmax overnight was 100.9°F.  She did have some hypokalemia to a potassium of 2.6.  Pain is well controlled but still occasionally having some left lower quadrant and central abdominal pain. Only some mild nausea that is controlled with Zofran.  No bowel movement yet.       21: Still having some lower abdominal pain but no worse.  Having nausea that is well controlled with Zofran.  Fevers have improved today with Tmax 99.7°F.  She is tolerating the antibiotics well without any adverse side effects.      Past Medical History:   Diagnosis Date   • Anemia    • History of chlamydia    • History of trichomonal vaginitis        Past Surgical History:   Procedure Laterality Date   •  SECTION     •  SECTION     •  SECTION WITH TUBAL  2013   • LAPAROSCOPIC ASSISTED VAGINAL HYSTERECTOMY  2021    for AUB-L,  pelvic pain. Ovaries retained per patient. Dr. Avani Horta.        Family History   Problem Relation Age of Onset   • Breast cancer Maternal Grandmother    • Ovarian cancer Neg Hx    • Colon cancer Neg Hx        Social History     Socioeconomic History   • Marital status: Single     Spouse name: Not on file   • Number of children: Not on file   • Years of education: Not on file   • Highest education level: Not on file   Tobacco Use   • Smoking status: Former Smoker   • Smokeless tobacco: Never Used   • Tobacco comment: off and on for 3 years    Substance and Sexual Activity   • Alcohol use: Yes     Comment: occ   • Drug use: Never       No Known Allergies      Medication:    Current Facility-Administered Medications:   •  acetaminophen (TYLENOL) tablet 650 mg, 650 mg, Oral, Q4H PRN, 650 mg at 07/06/21 0443 **OR** acetaminophen (TYLENOL) 160 MG/5ML solution 650 mg, 650 mg, Oral, Q4H PRN **OR** acetaminophen (TYLENOL) suppository 650 mg, 650 mg, Rectal, Q4H PRN, Tierney Martinez MD  •  bisacodyl (DULCOLAX) suppository 10 mg, 10 mg, Rectal, Daily PRN, Maribel Varela MD, 10 mg at 07/07/21 1506  •  DAPTOmycin (CUBICIN) 450 mg in sodium chloride 0.9 % 50 mL IVPB, 6 mg/kg (Adjusted), Intravenous, Q24H, Girish Martin PA, Last Rate: 100 mL/hr at 07/06/21 1728, 450 mg at 07/06/21 1728  •  docusate sodium (COLACE) capsule 100 mg, 100 mg, Oral, BID, Walter Prieto MD, 100 mg at 07/07/21 0812  •  enoxaparin (LOVENOX) syringe 40 mg, 40 mg, Subcutaneous, Daily, Walter Prieto MD, 40 mg at 07/07/21 0812  •  HYDROcodone-acetaminophen (NORCO) 7.5-325 MG per tablet 1 tablet, 1 tablet, Oral, Q4H PRN, Maribel Varela MD, 1 tablet at 07/07/21 1506  •  HYDROmorphone (DILAUDID) injection 0.5 mg, 0.5 mg, Intravenous, Q2H PRN, Tierney Martinez MD, 0.5 mg at 07/07/21 1127  •  Magnesium Sulfate 2 gram Bolus, followed by 8 gram infusion (total Mg dose 10 grams)- Mg less than or equal to 1mg/dL, 2 g, Intravenous, PRN  **OR** Magnesium Sulfate 2 gram / 50mL Infusion (GIVE X 3 BAGS TO EQUAL 6GM TOTAL DOSE) - Mg 1.1 - 1.5 mg/dl, 2 g, Intravenous, PRN **OR** Magnesium Sulfate 4 gram infusion- Mg 1.6-1.9 mg/dL, 4 g, Intravenous, PRN, Walter Prieto MD, Last Rate: 25 mL/hr at 07/06/21 2127, 4 g at 07/06/21 2127  •  ondansetron (ZOFRAN) tablet 4 mg, 4 mg, Oral, Q6H PRN **OR** ondansetron (ZOFRAN) injection 4 mg, 4 mg, Intravenous, Q6H PRN, Tierney Martinez MD, 4 mg at 07/07/21 0828  •  phenol (CHLORASEPTIC) 1.4 % liquid 2 spray, 2 spray, Mouth/Throat, Q2H PRN, Clovis, Corin, APRN  •  piperacillin-tazobactam (ZOSYN) 4.5 g in iso-osmotic dextrose 100 mL IVPB (premix), 4.5 g, Intravenous, Q8H, Titus Frias MD, 4.5 g at 07/07/21 1123  •  polyethylene glycol (MIRALAX) packet 17 g, 17 g, Oral, Daily, Walter Prieto MD, 17 g at 07/07/21 0812  •  potassium chloride (MICRO-K) CR capsule 40 mEq, 40 mEq, Oral, PRN, 40 mEq at 07/07/21 0734 **OR** potassium chloride (KLOR-CON) packet 40 mEq, 40 mEq, Oral, PRN **OR** potassium chloride 10 mEq in 100 mL IVPB, 10 mEq, Intravenous, Q1H PRN, Walter Prieto MD  •  Sodium Chloride (PF) 0.9 % 10 mL, 10 mL, Intravenous, PRN, Tierney Martinez MD  •  sodium chloride 0.9 % flush 1-10 mL, 1-10 mL, Intravenous, PRN, Tierney Martinez MD, 10 mL at 07/04/21 1638  •  sodium chloride 0.9 % flush 10 mL, 10 mL, Intravenous, Q12H, Tierney Martinez MD, 10 mL at 07/07/21 0812  •  sodium chloride 0.9 % infusion, 100 mL/hr, Intravenous, Continuous, Walter Prieto MD, Last Rate: 100 mL/hr at 07/06/21 2140, 100 mL/hr at 07/06/21 2140    Antibiotics:  Anti-Infectives (From admission, onward)    Ordered     Dose/Rate Route Frequency Start Stop    07/05/21 1631  DAPTOmycin (CUBICIN) 450 mg in sodium chloride 0.9 % 50 mL IVPB     Neri Prater, [facility] [facility] the [facility] 07[facility]06/21 0722[facility]   Ordering Provider: Girish Martin PA    6 mg/kg × 75.6 kg (Adjusted)  100 mL/hr over 30 Minutes Intravenous Every 24 Hours  21 1800 21 1759    21 0423  piperacillin-tazobactam (ZOSYN) 4.5 g in iso-osmotic dextrose 100 mL IVPB (premix)     Titus Frias MD reviewed the order on 21 0851.   Ordering Provider: Titus Frias MD    4.5 g  over 4 Hours Intravenous Every 8 Hours 21 1200 21 1159    21 0423  piperacillin-tazobactam (ZOSYN) 4.5 g in iso-osmotic dextrose 100 mL IVPB (premix)     Ordering Provider: Tierney Martinez MD    4.5 g  over 30 Minutes Intravenous Once 21 0600 21 0528    21 2355  vancomycin 1750 mg/500 mL 0.9% NS IVPB ([facility])     Ordering Provider: Lito Machuca DO    20 mg/kg × 83.9 kg  over 120 Minutes Intravenous Once 21 2357 21 0250    21 2355  piperacillin-tazobactam (ZOSYN) 3.375 g in iso-osmotic dextrose 50 ml (premix)     Ordering Provider: Lito Machuca DO    3.375 g  over 30 Minutes Intravenous Once 21 2357 21 0140            Review of Systems:  As above    Physical Exam:   Vital Signs  Temp (24hrs), Av.7 °F (37.1 °C), Min:98.1 °F (36.7 °C), Max:99.2 °F (37.3 °C)    Temp  Min: 98.1 °F (36.7 °C)  Max: 99.2 °F (37.3 °C)  BP  Min: 107/63  Max: 114/79  Pulse  Min: 69  Max: 102  Resp  Min: 17  Max: 18  SpO2  Min: 90 %  Max: 94 %    GENERAL: Awake and alert, in no acute distress. Lying comfortably in bed.  HEENT: Normocephalic, atraumatic.  No labial ulcers noted  HEART: RRR; No murmur, rubs, gallops.   LUNGS: CTAB. nonlabored breathing on room air  ABDOMEN: Soft, Mild Left lower abdominal tenderness, Nondistended. Laparoscopic incision sites are healing well. No HSM  EXT:  No cyanosis, clubbing or edema. No cord.  :  Without Menon catheter.   MSK:  FROM, no joint effusions  SKIN: noted rashes noted of her exposed skin.    NEURO: Oriented to PPT. Normal speech and cognition.  PSYCHIATRIC: Normal insight and judgment. Cooperative with PE    Laboratory Data    Results from last 7 days   Lab Units  07/07/21  0612 07/06/21  0713 07/05/21  0756   WBC 10*3/mm3 11.14* 10.84* 10.08   HEMOGLOBIN g/dL 9.8* 10.1* 11.0*   HEMATOCRIT % 30.6* 31.1* 33.5*   PLATELETS 10*3/mm3 184 158 170     Results from last 7 days   Lab Units 07/07/21  0612   SODIUM mmol/L 135*   POTASSIUM mmol/L 3.3*   CHLORIDE mmol/L 98   CO2 mmol/L 29.0   BUN mg/dL 3*   CREATININE mg/dL 0.63   GLUCOSE mg/dL 121*   CALCIUM mg/dL 8.6     Results from last 7 days   Lab Units 07/07/21  0612   ALK PHOS U/L 69   BILIRUBIN mg/dL 0.3   ALT (SGPT) U/L 6   AST (SGOT) U/L 11         Results from last 7 days   Lab Units 07/06/21  0713   CRP mg/dL 34.97*     Results from last 7 days   Lab Units 07/03/21  2230   LACTATE mmol/L 0.8     Results from last 7 days   Lab Units 07/05/21  0756   CK TOTAL U/L 32     Results from last 7 days   Lab Units 07/05/21  1230   VANCOMYCIN TR mcg/mL <4.00*     Estimated Creatinine Clearance: 155 mL/min (by C-G formula based on SCr of 0.63 mg/dL).      Microbiology:  Microbiology Results (last 10 days)     Procedure Component Value - Date/Time    MRSA Screen, PCR (Inpatient) - Swab, Nares [[id number]]  (Normal) Collected: 07/05/21 1828    Lab Status: Final result Specimen: Swab from Nares Updated: 07/05/21 2036     MRSA PCR Negative    Narrative:      MRSA Negative    COVID PRE-OP / PRE-PROCEDURE SCREENING ORDER (NO ISOLATION) - Swab, Nasopharynx [[id number]]  (Normal) Collected: 07/04/21 0302    Lab Status: Final result Specimen: Swab from Nasopharynx Updated: 07/04/21 0408    Narrative:      The following orders were created for panel order COVID PRE-OP / PRE-PROCEDURE SCREENING ORDER (NO ISOLATION) - Swab, Nasopharynx.  Procedure                               Abnormality         Status                     ---------                               -----------         ------                     COVID-19,CEPHEID,JORGE IN-...[[id number]]  Normal              Final result                 Please view results for these tests on the individual  orders.    COVID-19,CEPHEID,JORGE IN-HOUSE(OR EMERGENT/ADD-ON),NP SWAB IN TRANSPORT MEDIA 3-4 HR TAT - Swab, Nasopharynx [[id number]]  (Normal) Collected: 07/04/21 0302    Lab Status: Final result Specimen: Swab from Nasopharynx Updated: 07/04/21 0408     COVID19 Not Detected    Narrative:      Fact sheet for providers: https://www.fda.gov/media/613259/download     Fact sheet for patients: https://www.fda.gov/media/621175/download  Fact sheet for providers: https://www.fda.gov/media/900254/download     Fact sheet for patients: https://www.fda.gov/media/910789/download    Blood Culture - Blood, Arm, Left [[id number]] Collected: 07/04/21 0017    Lab Status: Preliminary result Specimen: Blood from Arm, Left Updated: 07/07/21 0445     Blood Culture No growth at 3 days    Blood Culture - Blood, Arm, Right [[id number]] Collected: 07/04/21 0017    Lab Status: Preliminary result Specimen: Blood from Arm, Right Updated: 07/07/21 0445     Blood Culture No growth at 3 days              Radiology:  Imaging Results (Last 72 Hours)     Procedure Component Value Units Date/Time    CT Guided Biopsy Aspiration Injection [[id number]] Collected: 07/05/21 1220     Updated: 07/05/21 1230    Narrative:      EXAMINATION: CT GUIDED ASPIRATION-perirectal collection.     INDICATION: History of hysterectomy approximately one month prior to  presentation with recent fevers and abdominal pain found to have  multifocal fluid collections within the lower abdomen and pelvis  suspicious for abscesses. Presents to interventional radiology for  CT-guided transgluteal perirectal drain placement.     TECHNIQUE: After informed written consent, the patient was placed prone  on the CT table.  CT revealed an approximately 5.5 x 2.8 cm  collection in the pouch of Drew. This was amenable to drainage. The  patient was prepped and draped in the normal sterile fashion. 1%  lidocaine was infiltrated for local anesthesia. Under CT fluoroscopy  scopic guidance,  an 18-gauge Chiba needle was advanced into the  collection. Discussion was found to be quite mobile and the needle was  unable to be advanced into the lesion proper. After 2 attempts, I  decided to abort the procedure. The patient tolerated the procedure  well.     The radiation dose reduction device was turned on for each scan per the  ALARA (As Low as Reasonably Achievable) protocol.     COMPARISON: CT abdomen and pelvis from 7/3/2021     FINDINGS: Mobile perirectal collection possibly representing an early  abscess. Unable to be punctured due to mobility. Procedure was aborted.          Impression:      Mobile perirectal collection similar to that seen on prior  imaging. Due to mobility, procedure was aborted. Recommend follow-up  imaging in 3 days to evaluate for possible reattempt.        This report was finalized on 7/5/2021 12:27 PM by Catalina Bowman.               Impression:   - Sepsis with fever, tachycardia, leukocytosis, and post-operative pelvic abscesses  - Multiple pelvic abscess after prior hysterectomy 5/27/21.  CT-guided aspiration of 5.7 cm abscess was aborted as it was not yet loculated and drain placement was not possible.  Can reassess for drainage in 3 days.   - Leukocytosis/neutrophilia, slightly worse  - Pelvic pain  - Fever, improving  - Abnormal uterine bleeding/fibroid tumors s/p hysterectomy (vaginal-assisted) with ovaries retained  -hypokalemia- ongoing but improved    PLAN/RECOMMENDATIONS:   Thank you for asking us to see Anisa Sosa, I recommend the following:  - Follow blood cultures. -no growth to date  - Continue Zosyn for empiric IA coverage  - Continue Daptomycin 6 mg/kg IV daily to empirically cover MRSA and VRE.   - Gynecology following.   - consider repeating CT abdomen and pelvis on Thursday    I discussed with Dr. Varela today    Titus Frias MD  7/7/2021  17:39 EDT                    Electronically signed by Titus Frias MD at 07/07/21 7768     Amee Robertson  MD Deena at 21 0843           Quiana Sosa  : 1990  MRN: [id number]  CSN: [id number]    Hospital Day # 4   POD#41 s/p ISAMAR/BS at outside hospital   CC: hospital follow up of post op pelvic abscesses  Subjective   Patient still receiving IV and oral narcotics for pain. She had episode of emesis this morning after taking potassium replacement pills.  She reports that she tolerated some apple juice water and chicken broth yesterday.  She reports she still has abdominal pain and rectal pressure but it is improved compared to when she was admitted but she still reports requiring the pain medication to get relief and to be able to ambulate.  No fevers overnight.    Objective     Min/max vitals past 24 hours:   Temp  Min: 98.1 °F (36.7 °C)  Max: 99.2 °F (37.3 °C)  BP  Min: 107/63  Max: 119/80  Pulse  Min: 64  Max: 102  Resp  Min: 18  Max: 18        I/O last 3 completed shifts:  In: 1200 [I.V.:1000; IV Piggyback:200]  Out: 1900 [Urine:1900]    General: well developed; well nourished  no acute distress   Abdomen: soft, mildly TTP throughout lower quadrants   No rebound or guarding   no umbilical or inguinal hernias are present  no hepato-splenomegaly  incision is clean, dry, intact and without drainage   Pelvic: Not performed   Ext: Calves NT     CBC w/ diff:   Lab Results   Component Value Date     2021    HGB 9.8 (L) 2021    HCT 30.6 (L) 2021    MCV 95.9 2021    RDW 12.6 2021    WBC 11.14 (H) 2021    NEUTRORELPCT 83.8 (H) 2021    AUTOIGPER 0.5 2021    LYMPHORELPCT 9.1 (L) 2021    MONORELPCT 5.2 2021    EOSRELPCT 1.0 2021    BASORELPCT 0.4 2021     CMP:   Lab Results   Component Value Date     (L) 2021    K 3.3 (L) 2021    CL 98 2021    CO2 29.0 2021    BUN 3 (L) 2021    CREATININE 0.63 2021    GLUCOSE 121 (H) 2021    ALBUMIN 2.60 (L) 2021    CALCIUM 8.6  2021    AST 11 2021    ALT 6 2021    BILITOT 0.3 2021     UA:    Lab Results   Component Value Date    SQUAMEPIUA 3-6 (A) 2021    SPECGRAVUR 1.026 2021    KETONESU Negative 2021    BLOODU Negative 2021    LEUKOCYTESUR Negative 2021    NITRITEU Negative 2021    RBCUA 3-6 (A) 2021    WBCUA 3-5 (A) 2021    BACTERIA None Seen 2021     Urine Culture: No results found for: URINECX      Assessment   1. POD#41 s/p LAVH/BS for AUB-L, pelvic pain per patient.   2. Post operative pelvic abscesses     Plan   2. Continue IV antibiotics per ID  3. Recommend repeat imaging with attempt by IR either today or tomorrow. If unable to be drained and any worsening clinical status will consult gyn oncology to assist with drainage if needed   4. Recommend rectal suppository for constipation if patient amenable given no BM in over a week   5. Discussed plan of care with patient and all questions were answered to the best of my ability    Amee Robertson MD  2021  08:43 EDT              Electronically signed by Amee Robertson MD at 21 0846     Maribel Varela MD at 21 0814              [facility] Medicine Services  PROGRESS NOTE    Patient Name: Anisa Sosa  : 1990  MRN: [id number]    Date of Admission: 7/3/2021  Primary Care Physician: Provider, No Known    Subjective   Subjective     CC:  Abdominal pain    HPI:   Pt had emesis x 2 this am and complaining of constipation resistant to oral bowel regimen. Willing to try suppository and, if that is unsuccessful, then willing to do enema. Also, c/o breakthrough pain with PO pain regimen- states that pain meds last the full four hours, however, she needs another dose right at four hours and there is some lag in request for it and actually getting pain meds. Otherwise, she has been afebrile for the last 24 hours.       ROS:  Gen- no fevers, chills  CV- No  chest pain, palpitations  Resp- No cough, dyspnea  GI- + abd pain        Objective   Objective     Vital Signs:   Temp:  [98.1 °F (36.7 °C)-99.2 °F (37.3 °C)] 98.1 °F (36.7 °C)  Heart Rate:  [] 75  Resp:  [18] 18  BP: (107-119)/(63-80) 108/69        Physical Exam:  Constitutional - no acute distress, nontoxic, in bed  HEENT-NCAT, mucous membranes moist  CV-RRR, S1 S2 normal, no m/r/g  Resp-CTAB, no wheezes, rhonchi or rales  Abd-soft, mild lower quadrant tenderness, nondistended, normoactive bowel sounds  Ext-No lower extremity cyanosis, clubbing or edema bilaterally  Neuro-alert and oriented x 3, speech clear, moves all extremities   Psych-normal affect   Skin- No rash on exposed UE or LE bilaterally    Results Reviewed:  Results from last 7 days   Lab Units 07/07/21  0612 07/06/21  0713 07/05/21  0756 07/04/21  0518 07/03/21  2230   WBC 10*3/mm3 11.14* 10.84* 10.08  --  14.40*   HEMOGLOBIN g/dL 9.8* 10.1* 11.0*  --  12.2   HEMATOCRIT % 30.6* 31.1* 33.5*  --  37.2   PLATELETS 10*3/mm3 184 158 170  --  175   INR   --   --   --  1.22*  --    PROCALCITONIN ng/mL  --   --  0.21  --  0.30*     Results from last 7 days   Lab Units 07/07/21  0612 07/07/21  0035 07/06/21  0713 07/03/21  2230   SODIUM mmol/L 135*  --  137 139   POTASSIUM mmol/L 3.3* 3.4* 2.6* 3.5   CHLORIDE mmol/L 98  --  98 100   CO2 mmol/L 29.0  --  31.0* 28.0   BUN mg/dL 3*  --  2* 6   CREATININE mg/dL 0.63  --  0.62 0.72   GLUCOSE mg/dL 121*  --  98 99   CALCIUM mg/dL 8.6  --  8.7 9.6   ALT (SGPT) U/L 6  --  6 9   AST (SGOT) U/L 11  --  13 13     Estimated Creatinine Clearance: 155 mL/min (by C-G formula based on SCr of 0.63 mg/dL).    Microbiology Results Abnormal     Procedure Component Value - Date/Time    Blood Culture - Blood, Arm, Left [[id number]] Collected: 07/04/21 0017    Lab Status: Preliminary result Specimen: Blood from Arm, Left Updated: 07/07/21 0445     Blood Culture No growth at 3 days    Blood Culture - Blood, Arm, Right  [[id number]] Collected: 07/04/21 0017    Lab Status: Preliminary result Specimen: Blood from Arm, Right Updated: 07/07/21 0445     Blood Culture No growth at 3 days    MRSA Screen, PCR (Inpatient) - Swab, Nares [[id number]]  (Normal) Collected: 07/05/21 1828    Lab Status: Final result Specimen: Swab from Nares Updated: 07/05/21 2036     MRSA PCR Negative    Narrative:      MRSA Negative    COVID PRE-OP / PRE-PROCEDURE SCREENING ORDER (NO ISOLATION) - Swab, Nasopharynx [[id number]]  (Normal) Collected: 07/04/21 0302    Lab Status: Final result Specimen: Swab from Nasopharynx Updated: 07/04/21 0408    Narrative:      The following orders were created for panel order COVID PRE-OP / PRE-PROCEDURE SCREENING ORDER (NO ISOLATION) - Swab, Nasopharynx.  Procedure                               Abnormality         Status                     ---------                               -----------         ------                     COVID-19,CEPHEID,JORGE IN-...[[id number]]  Normal              Final result                 Please view results for these tests on the individual orders.    COVID-19,CEPHEID,JORGE IN-HOUSE(OR EMERGENT/ADD-ON),NP SWAB IN TRANSPORT MEDIA 3-4 HR TAT - Swab, Nasopharynx [[id number]]  (Normal) Collected: 07/04/21 0302    Lab Status: Final result Specimen: Swab from Nasopharynx Updated: 07/04/21 0408     COVID19 Not Detected    Narrative:      Fact sheet for providers: https://www.fda.gov/media/780609/download     Fact sheet for patients: https://www.fda.gov/media/681269/download  Fact sheet for providers: https://www.fda.gov/media/169874/download     Fact sheet for patients: https://www.fda.gov/media/599175/download          Imaging Results (Last 24 Hours)     ** No results found for the last 24 hours. **              I have reviewed the medications:  Scheduled Meds:DAPTOmycin, 6 mg/kg (Adjusted), Intravenous, Q24H  docusate sodium, 100 mg, Oral, BID  enoxaparin, 40 mg, Subcutaneous, Daily  piperacillin-tazobactam,  4.5 g, Intravenous, Q8H  polyethylene glycol, 17 g, Oral, Daily  sodium chloride, 10 mL, Intravenous, Q12H      Continuous Infusions:sodium chloride, 100 mL/hr, Last Rate: 100 mL/hr (07/06/21 2140)      PRN Meds:.•  acetaminophen **OR** acetaminophen **OR** acetaminophen  •  HYDROcodone-acetaminophen  •  HYDROmorphone  •  magnesium sulfate **OR** magnesium sulfate **OR** magnesium sulfate  •  ondansetron **OR** ondansetron  •  phenol  •  potassium chloride **OR** potassium chloride **OR** potassium chloride  •  Sodium Chloride (PF)  •  sodium chloride    Assessment/Plan   Assessment & Plan     Active Hospital Problems    Diagnosis  POA   • Intra-abdominal abscess post-procedure [T81.43XA]  Yes      Resolved Hospital Problems   No resolved problems to display.        Brief Hospital Course to date:  Anisa Sosa is a 31 y.o. female with history of elective laparoscopic hysterectomy in [place] on May 27.  She recently reports increased abdominal pain and anorexia as well as no fevers.  CT abdomen pelvis here showed extensive peritoneal inflammation throughout the pelvis with multiple small peripherally enhancing interloop fluid collections scattered in the low pelvis with the largest measuring 5.7 cm in the perirectal area.    Fever  Leukocytosis  Intra-abdominal abscesses  - afebrile for last 24 hours  - Leukocytosis had resolved, but now back up again,  but CRP increased to 34 as of yesterday.  -Continue daptomycin/zosyn; consider antifugals if clinical worsening  -no discrete walled abscess amenable for drainage on 7/5 -- may need to re-image if febrile again. D/w Dr. Frias today, will repeat CT scan in am and see if there any collection of fluid amenable to drainage. If so, will ask IR to do drainage.    -- will schedule pain meds as pt is having breakthrough pain due to lag in request and getting next dose  -Pain control    DVT prophylaxis: lovenox    Disposition: I expect the patient to be discharged  home tbd    CODE STATUS:   Code Status and Medical Interventions:   Ordered at: 21 0418     Level Of Support Discussed With:    Patient     Code Status:    CPR     Medical Interventions (Level of Support Prior to Arrest):    Full       Maribel Varela MD  21                Electronically signed by Maribel Varela MD at 21 1429     Amee Robertson MD at 21 0840           Quiana Sosa  : 1990  MRN: [id number]  CSN: [id number]    Hospital Day # 3   POD#40 s/p LAVH/BS at outside hospital   CC: hospital follow up of post op pelvic abscesses  Subjective   She reports pain is a little improved but still taking IV dilaudid q2 hours. Passing flatus but no BM. No emesis. Tolerating small amount of diet. Reports rectal pressure has decreased some. No fevers overnight.     Objective     Min/max vitals past 24 hours:   Temp  Min: 97.9 °F (36.6 °C)  Max: 100.9 °F (38.3 °C)  BP  Min: 96/62  Max: 110/71  Pulse  Min: 78  Max: 88  Resp  Min: 18  Max: 18        I/O last 3 completed shifts:  In: 3247 [P.O.:1161; I.V.:1536; IV Piggyback:550]  Out: 3700 [Urine:3700]    General: well developed; well nourished  no acute distress   Abdomen: Soft, mildly TTP in lower quadrants  +BS throughout   no umbilical or inguinal hernias are present  no hepato-splenomegaly  incision is clean, dry, intact and without drainage   Pelvic: Not performed   Ext: Calves NT     CBC:   Lab Results   Component Value Date     2021    HGB 10.1 (L) 2021    HCT 31.1 (L) 2021    WBC 10.84 (H) 2021     CBC w/ diff:   Lab Results   Component Value Date     2021    HGB 10.1 (L) 2021    HCT 31.1 (L) 2021    MCV 94.8 2021    RDW 12.6 2021    WBC 10.84 (H) 2021    NEUTRORELPCT 89.7 (H) 2021    AUTOIGPER 0.6 (H) 2021    LYMPHORELPCT 3.3 (L) 2021    MONORELPCT 4.9 (L) 2021    EOSRELPCT 1.3 2021    BASORELPCT  0.2 2021     CMP:   Lab Results   Component Value Date     2021    K 2.6 (C) 2021    CL 98 2021    CO2 31.0 (H) 2021    BUN 2 (L) 2021    CREATININE 0.62 2021    GLUCOSE 98 2021    ALBUMIN 2.70 (L) 2021    CALCIUM 8.7 2021    AST 13 2021    ALT 6 2021    BILITOT 0.4 2021         Assessment   3. POD#40 s/p LAVH/BS for AUB-L, pelvic pain per patient.   4. Post operative pelvic abscesses - unable to be drained by IR yesterday     Plan   6. Continue IV antibiotics. If patient were to fever again would consult gyn oncology especially if IR still unable to drain 5cm abscess  7. Attempt to transition to PO narcotics.   8. Agree with bowel regimen   9. Discussed plan of care with patient and all questions were answered to the best of my ability.     Amee Robertson MD  2021  08:40 EDT              Electronically signed by Amee Robertson MD at 21 0843     Walter Prieto MD at 21 0751              [facility] Medicine Services  PROGRESS NOTE    Patient Name: Anisa Sosa  : 1990  MRN: [id number]    Date of Admission: 7/3/2021  Primary Care Physician: Provider, No Known    Subjective   Subjective     CC:  Abdominal pain    HPI: low abdominal pain continues. No bm. Low grade fevers continue. Not very hungry, hasnt eaten much for a week      ROS:  Gen- + fevers  CV- No chest pain, palpitations  Resp- No cough, dyspnea  GI- + abd pain        Objective   Objective     Vital Signs:   Temp:  [97.9 °F (36.6 °C)-100.9 °F (38.3 °C)] 97.9 °F (36.6 °C)  Heart Rate:  [78-88] 78  Resp:  [18] 18  BP: ()/(60-71) 106/60        Physical Exam:  Constitutional - no acute distress, nontoxic, in bed  HEENT-NCAT, mucous membranes moist  CV-RRR, S1 S2 normal, no m/r/g  Resp-CTAB, no wheezes, rhonchi or rales  Abd-soft, mild lower quadrant tenderness, nondistended, normoactive bowel sounds  Ext-No lower  extremity cyanosis, clubbing or edema bilaterally  Neuro-alert and oriented x 3, speech clear, moves all extremities   Psych-normal affect   Skin- No rash on exposed UE or LE bilaterally    Results Reviewed:  Results from last 7 days   Lab Units 07/06/21  0713 07/05/21  0756 07/04/21  0518 07/03/21  2230   WBC 10*3/mm3 10.84* 10.08  --  14.40*   HEMOGLOBIN g/dL 10.1* 11.0*  --  12.2   HEMATOCRIT % 31.1* 33.5*  --  37.2   PLATELETS 10*3/mm3 158 170  --  175   INR   --   --  1.22*  --    PROCALCITONIN ng/mL  --  0.21  --  0.30*     Results from last 7 days   Lab Units 07/03/21  2230   SODIUM mmol/L 139   POTASSIUM mmol/L 3.5   CHLORIDE mmol/L 100   CO2 mmol/L 28.0   BUN mg/dL 6   CREATININE mg/dL 0.72   GLUCOSE mg/dL 99   CALCIUM mg/dL 9.6   ALT (SGPT) U/L 9   AST (SGOT) U/L 13     Estimated Creatinine Clearance: 133.5 mL/min (by C-G formula based on SCr of 0.72 mg/dL).    Microbiology Results Abnormal     Procedure Component Value - Date/Time    Blood Culture - Blood, Arm, Left [[id number]] Collected: 07/04/21 0017    Lab Status: Preliminary result Specimen: Blood from Arm, Left Updated: 07/06/21 0445     Blood Culture No growth at 2 days    Blood Culture - Blood, Arm, Right [[id number]] Collected: 07/04/21 0017    Lab Status: Preliminary result Specimen: Blood from Arm, Right Updated: 07/06/21 0445     Blood Culture No growth at 2 days    MRSA Screen, PCR (Inpatient) - Swab, Nares [[id number]]  (Normal) Collected: 07/05/21 1828    Lab Status: Final result Specimen: Swab from Nares Updated: 07/05/21 2036     MRSA PCR Negative    Narrative:      MRSA Negative    COVID PRE-OP / PRE-PROCEDURE SCREENING ORDER (NO ISOLATION) - Swab, Nasopharynx [[id number]]  (Normal) Collected: 07/04/21 0302    Lab Status: Final result Specimen: Swab from Nasopharynx Updated: 07/04/21 0408    Narrative:      The following orders were created for panel order COVID PRE-OP / PRE-PROCEDURE SCREENING ORDER (NO ISOLATION) - Swab,  Nasopharynx.  Procedure                               Abnormality         Status                     ---------                               -----------         ------                     COVID-19,CEPHEID,JORGE IN-...[[id number]]  Normal              Final result                 Please view results for these tests on the individual orders.    COVID-19,CEPHEID,JORGE IN-HOUSE(OR EMERGENT/ADD-ON),NP SWAB IN TRANSPORT MEDIA 3-4 HR TAT - Swab, Nasopharynx [[id number]]  (Normal) Collected: 07/04/21 0302    Lab Status: Final result Specimen: Swab from Nasopharynx Updated: 07/04/21 0408     COVID19 Not Detected    Narrative:      Fact sheet for providers: https://www.fda.gov/media/070814/download     Fact sheet for patients: https://www.fda.gov/media/727093/download  Fact sheet for providers: https://www.fda.gov/media/680991/download     Fact sheet for patients: https://www.fda.gov/media/906321/download          Imaging Results (Last 24 Hours)     Procedure Component Value Units Date/Time    CT Guided Biopsy Aspiration Injection [[id number]] Collected: 07/05/21 1220     Updated: 07/05/21 1230    Narrative:      EXAMINATION: CT GUIDED ASPIRATION-perirectal collection.     INDICATION: History of hysterectomy approximately one month prior to  presentation with recent fevers and abdominal pain found to have  multifocal fluid collections within the lower abdomen and pelvis  suspicious for abscesses. Presents to interventional radiology for  CT-guided transgluteal perirectal drain placement.     TECHNIQUE: After informed written consent, the patient was placed prone  on the CT table.  CT revealed an approximately 5.5 x 2.8 cm  collection in the pouch of Drew. This was amenable to drainage. The  patient was prepped and draped in the normal sterile fashion. 1%  lidocaine was infiltrated for local anesthesia. Under CT fluoroscopy  scopic guidance, an 18-gauge Chiba needle was advanced into the  collection. Discussion was found to  be quite mobile and the needle was  unable to be advanced into the lesion proper. After 2 attempts, I  decided to abort the procedure. The patient tolerated the procedure  well.     The radiation dose reduction device was turned on for each scan per the  ALARA (As Low as Reasonably Achievable) protocol.     COMPARISON: CT abdomen and pelvis from 7/3/2021     FINDINGS: Mobile perirectal collection possibly representing an early  abscess. Unable to be punctured due to mobility. Procedure was aborted.          Impression:      Mobile perirectal collection similar to that seen on prior  imaging. Due to mobility, procedure was aborted. Recommend follow-up  imaging in 3 days to evaluate for possible reattempt.        This report was finalized on 7/5/2021 12:27 PM by Catalina Bowman.                 I have reviewed the medications:  Scheduled Meds:DAPTOmycin, 6 mg/kg (Adjusted), Intravenous, Q24H  piperacillin-tazobactam, 4.5 g, Intravenous, Q8H  polyethylene glycol, 17 g, Oral, Daily  sodium chloride, 10 mL, Intravenous, Q12H      Continuous Infusions:sodium chloride, 100 mL/hr, Last Rate: 100 mL/hr (07/06/21 0614)      PRN Meds:.•  acetaminophen **OR** acetaminophen **OR** acetaminophen  •  HYDROmorphone  •  ondansetron **OR** ondansetron  •  phenol  •  Sodium Chloride (PF)  •  sodium chloride    Assessment/Plan   Assessment & Plan     Active Hospital Problems    Diagnosis  POA   • Intra-abdominal abscess post-procedure [T81.43XA]  Yes      Resolved Hospital Problems   No resolved problems to display.        Brief Hospital Course to date:  Anisa Sosa is a 31 y.o. female with history of elective laparoscopic hysterectomy in [place] on May 27.  She recently reports increased abdominal pain and anorexia as well as no fevers.  CT abdomen pelvis here shows extensive peritoneal inflammation throughout the pelvis with multiple small peripherally enhancing interloop fluid collections scattered in the low pelvis with the  largest measuring 5.7 cm in the perirectal area.    Fever  Leukocytosis  Intra-abdominal abscesses  - ongoing fevers with Tmax 100.9  - Leukocytosis resolved, but CRP increased to 34 today.  -Continue daptomycin/zosyn; consider antifugals if clinical worsening  -no discrete walled abscess amenable for drainage on 7/5 -- can consider re-imaging in 2-3 days as needed  -Pain control    DVT prophylaxis: lovenox    Disposition: I expect the patient to be discharged home tbd    CODE STATUS:   Code Status and Medical Interventions:   Ordered at: 07/04/21 0418     Level Of Support Discussed With:    Patient     Code Status:    CPR     Medical Interventions (Level of Support Prior to Arrest):    Full       Walter Prieto MD  07/06/21                Electronically signed by Walter Prieto MD at 07/06/21 1404          Consult Notes (last 72 hours) (Notes from 07/05/21 1417 through 07/08/21 1417)      Titus Frias MD at 07/06/21 1547          INFECTIOUS DISEASE CONSULT/INITIAL HOSPITAL VISIT    Anisa Sosa  1990  [id number]    Date of Consult: 7/5/21  Admission Date: 7/3/2021      Requesting Provider: Walter Prieto MD  Evaluating Physician: Titus rFias MD    Reason for Consultation: intraabdominal abscesses after hysterectomy    History of present illness:    Patient is a 31 y.o. female with h/o STIs and abnormal uterine bleeding/uterine fibroids s/p lap vaginal-assisted hysterectomy in [place] on 5/27/21 (with ovaries retained) who followed up a week later and was placed on 5 days of Cipro for possible UTI. She did relatively well post-operatively with mild tenderness.  Last Sunday, she went to a tournament in [place] to watch her daughter and started feeling fatigued.  She slept for a couple of days, but on Tuesday, she began having severe abdominal pain in lower part of abdomen.  She has some dizziness and lightheadedness with the pain.  She went to [facility] ED on 6/30  with a CT scan at that time showing some inflammation of the pelvic area.  She was sent home on ibuprofen, pain meds, and Cipro.  She continued to have pain and pelvic pressure with no relief from medications or heating pad.  She had some difficulty breathing because of the bloating and pressure.  She had not had a BM since . She started having nausea and vomiting on .  On , she had worsening pain and came to [facility] ED on 7/3.  A CT scan here showed interloop abscesses 1 to 2 cm in sizes and a 5.7 cm abscess near the rectum.  A CT-guided aspiration of prerectal abscess was attempted today but aborted d/t the fluid collection being mobile.  Her Tmax since arrival has been 102.5.  Pertinent labs are PCT 0.3, lactic acid 0.8, WBC 14,400 with 90% neutrophils, creatinine 0.72, CRP 31.7, and UA WBC 3-5. Blood cultures are negative to date. A COVID-19 PCR was negative. She is currently on Vancomycin and Zosyn.  ID was asked to evaluate and manage her antibiotic therapy.       Subjective:    21: The patient did have some chills last night but fevers seem to be better today.  Tmax overnight was 100.9°F.  She did have some hypokalemia to a potassium of 2.6.  Pain is well controlled but still occasionally having some left lower quadrant and central abdominal pain. Only some mild nausea that is controlled with Zofran.  No bowel movement yet.            Past Medical History:   Diagnosis Date   • Anemia    • History of chlamydia    • History of trichomonal vaginitis        Past Surgical History:   Procedure Laterality Date   •  SECTION     •  SECTION     •  SECTION WITH TUBAL  2013   • LAPAROSCOPIC ASSISTED VAGINAL HYSTERECTOMY  2021    for AUB-L, pelvic pain. Ovaries retained per patient. Dr. Avani Horta.        Family History   Problem Relation Age of Onset   • Breast cancer Maternal Grandmother    • Ovarian cancer Neg Hx    • Colon cancer Neg Hx        Social History      Socioeconomic History   • Marital status: Single     Spouse name: Not on file   • Number of children: Not on file   • Years of education: Not on file   • Highest education level: Not on file   Tobacco Use   • Smoking status: Former Smoker   • Smokeless tobacco: Never Used   • Tobacco comment: off and on for 3 years    Substance and Sexual Activity   • Alcohol use: Yes     Comment: occ   • Drug use: Never       No Known Allergies      Medication:    Current Facility-Administered Medications:   •  acetaminophen (TYLENOL) tablet 650 mg, 650 mg, Oral, Q4H PRN, 650 mg at 07/06/21 0443 **OR** acetaminophen (TYLENOL) 160 MG/5ML solution 650 mg, 650 mg, Oral, Q4H PRN **OR** acetaminophen (TYLENOL) suppository 650 mg, 650 mg, Rectal, Q4H PRN, Tierney Martinez MD  •  DAPTOmycin (CUBICIN) 450 mg in sodium chloride 0.9 % 50 mL IVPB, 6 mg/kg (Adjusted), Intravenous, Q24H, Girish Martin PA, Last Rate: 100 mL/hr at 07/05/21 1844, 450 mg at 07/05/21 1844  •  docusate sodium (COLACE) capsule 100 mg, 100 mg, Oral, BID, Walter Prieto MD  •  enoxaparin (LOVENOX) syringe 40 mg, 40 mg, Subcutaneous, Daily, Walter Prieto MD  •  HYDROcodone-acetaminophen (NORCO) 7.5-325 MG per tablet 1 tablet, 1 tablet, Oral, Q6H PRN, Walter Prieto MD, 1 tablet at 07/06/21 1535  •  HYDROmorphone (DILAUDID) injection 0.5 mg, 0.5 mg, Intravenous, Q2H PRN, Tierney Martinze MD, 0.5 mg at 07/06/21 0830  •  Magnesium Sulfate 2 gram Bolus, followed by 8 gram infusion (total Mg dose 10 grams)- Mg less than or equal to 1mg/dL, 2 g, Intravenous, PRN **OR** Magnesium Sulfate 2 gram / 50mL Infusion (GIVE X 3 BAGS TO EQUAL 6GM TOTAL DOSE) - Mg 1.1 - 1.5 mg/dl, 2 g, Intravenous, PRN **OR** Magnesium Sulfate 4 gram infusion- Mg 1.6-1.9 mg/dL, 4 g, Intravenous, PRN, Walter Prieto MD  •  ondansetron (ZOFRAN) tablet 4 mg, 4 mg, Oral, Q6H PRN **OR** ondansetron (ZOFRAN) injection 4 mg, 4 mg, Intravenous, Q6H PRN, Tierney Martinez MD, 4 mg at 07/06/21 0943  •   phenol (CHLORASEPTIC) 1.4 % liquid 2 spray, 2 spray, Mouth/Throat, Q2H PRN, Corin Brannon APRN  •  piperacillin-tazobactam (ZOSYN) 4.5 g in iso-osmotic dextrose 100 mL IVPB (premix), 4.5 g, Intravenous, Q8H, Tierney Martinez MD, 4.5 g at 07/06/21 1149  •  polyethylene glycol (MIRALAX) packet 17 g, 17 g, Oral, Daily, Walter Prieto MD, 17 g at 07/06/21 0830  •  potassium chloride (MICRO-K) CR capsule 40 mEq, 40 mEq, Oral, PRN, 40 mEq at 07/06/21 0943 **OR** potassium chloride (KLOR-CON) packet 40 mEq, 40 mEq, Oral, PRN **OR** potassium chloride 10 mEq in 100 mL IVPB, 10 mEq, Intravenous, Q1H PRN, Walter Prieto MD  •  Sodium Chloride (PF) 0.9 % 10 mL, 10 mL, Intravenous, PRN, Tierney Martinez MD  •  sodium chloride 0.9 % flush 1-10 mL, 1-10 mL, Intravenous, PRN, Tierney Martinez MD, 10 mL at 07/04/21 1638  •  sodium chloride 0.9 % flush 10 mL, 10 mL, Intravenous, Q12H, Tierney Martinez MD, 10 mL at 07/06/21 0830  •  sodium chloride 0.9 % infusion, 100 mL/hr, Intravenous, Continuous, Walter Prieto MD, Last Rate: 100 mL/hr at 07/06/21 0614, 100 mL/hr at 07/06/21 0614    Antibiotics:  Anti-Infectives (From admission, onward)    Ordered     Dose/Rate Route Frequency Start Stop    07/05/21 1631  DAPTOmycin (CUBICIN) 450 mg in sodium chloride 0.9 % 50 mL IVPB     Neri Prater, [facility] reviewed the order on 07/06/21 0722.   Ordering Provider: Girish Martin PA    6 mg/kg × 75.6 kg (Adjusted)  100 mL/hr over 30 Minutes Intravenous Every 24 Hours 07/05/21 1800 07/19/21 1759    07/04/21 0423  piperacillin-tazobactam (ZOSYN) 4.5 g in iso-osmotic dextrose 100 mL IVPB (premix)     Neri Prater, [facility] reviewed the order on 07/06/21 0722.   Ordering Provider: Tierney Martinez MD    4.5 g  over 4 Hours Intravenous Every 8 Hours 07/04/21 1200 07/09/21 1159    07/04/21 0423  piperacillin-tazobactam (ZOSYN) 4.5 g in iso-osmotic dextrose 100 mL IVPB (premix)     Ordering Provider: Tierney Martinez MD    4.5 g  over 30  Minutes Intravenous Once 21 0600 21 0528    21 2355  vancomycin 1750 mg/500 mL 0.9% NS IVPB ([facility])     Ordering Provider: Lito Machuca DO    20 mg/kg × 83.9 kg  over 120 Minutes Intravenous Once 21 2357 21 0250    21 2355  piperacillin-tazobactam (ZOSYN) 3.375 g in iso-osmotic dextrose 50 ml (premix)     Ordering Provider: Lito Machuca DO    3.375 g  over 30 Minutes Intravenous Once 21 2357 21 0140            Review of Systems:  As above    Physical Exam:   Vital Signs  Temp (24hrs), Av.9 °F (37.2 °C), Min:97.9 °F (36.6 °C), Max:100.1 °F (37.8 °C)    Temp  Min: 97.9 °F (36.6 °C)  Max: 100.1 °F (37.8 °C)  BP  Min: 99/61  Max: 119/80  Pulse  Min: 64  Max: 88  Resp  Min: 18  Max: 18  SpO2  Min: 93 %  Max: 97 %    GENERAL: Awake and alert, in no acute distress. Lying comfortably in bed.  HEENT: Normocephalic, atraumatic.  No labial ulcers noted  NECK: Supple without nuchal rigidity. No mass.  HEART: RRR; No murmur, rubs, gallops.   LUNGS: Clear to auscultation bilaterally without wheezing, rales, rhonchi. Normal respiratory effort. Nonlabored.  ABDOMEN: Soft, Mild Left lower abdominal tenderness, Nondistended. Laparoscopic incision sites are healing well. Quiet bowel sounds. No rebound or guarding. NO mass or HSM.  EXT:  No cyanosis, clubbing or edema. No cord.  :  Without Menon catheter.   MSK:  FROM, no joint effusions  SKIN: Warm and dry without cutaneous eruptions on Inspection/palpation.    NEURO: Oriented to PPT. Normal speech and cognition.  PSYCHIATRIC: Normal insight and judgment. Cooperative with PE    Laboratory Data    Results from last 7 days   Lab Units 21  0713 21  0756 21  2230   WBC 10*3/mm3 10.84* 10.08 14.40*   HEMOGLOBIN g/dL 10.1* 11.0* 12.2   HEMATOCRIT % 31.1* 33.5* 37.2   PLATELETS 10*3/mm3 158 170 175     Results from last 7 days   Lab Units 21  0713   SODIUM mmol/L 137   POTASSIUM mmol/L 2.6*    CHLORIDE mmol/L 98   CO2 mmol/L 31.0*   BUN mg/dL 2*   CREATININE mg/dL 0.62   GLUCOSE mg/dL 98   CALCIUM mg/dL 8.7     Results from last 7 days   Lab Units 07/06/21  0713   ALK PHOS U/L 76   BILIRUBIN mg/dL 0.4   ALT (SGPT) U/L 6   AST (SGOT) U/L 13         Results from last 7 days   Lab Units 07/06/21  0713   CRP mg/dL 34.97*     Results from last 7 days   Lab Units 07/03/21  2230   LACTATE mmol/L 0.8     Results from last 7 days   Lab Units 07/05/21  0756   CK TOTAL U/L 32     Results from last 7 days   Lab Units 07/05/21  1230   VANCOMYCIN TR mcg/mL <4.00*     Estimated Creatinine Clearance: 155.1 mL/min (by C-G formula based on SCr of 0.62 mg/dL).      Microbiology:  Microbiology Results (last 10 days)     Procedure Component Value - Date/Time    MRSA Screen, PCR (Inpatient) - Swab, Nares [[id number]]  (Normal) Collected: 07/05/21 1828    Lab Status: Final result Specimen: Swab from Nares Updated: 07/05/21 2036     MRSA PCR Negative    Narrative:      MRSA Negative    COVID PRE-OP / PRE-PROCEDURE SCREENING ORDER (NO ISOLATION) - Swab, Nasopharynx [[id number]]  (Normal) Collected: 07/04/21 0302    Lab Status: Final result Specimen: Swab from Nasopharynx Updated: 07/04/21 0408    Narrative:      The following orders were created for panel order COVID PRE-OP / PRE-PROCEDURE SCREENING ORDER (NO ISOLATION) - Swab, Nasopharynx.  Procedure                               Abnormality         Status                     ---------                               -----------         ------                     COVID-19,CEPHEID,JORGE IN-...[[id number]]  Normal              Final result                 Please view results for these tests on the individual orders.    COVID-19,CEPHEID,JORGE IN-HOUSE(OR EMERGENT/ADD-ON),NP SWAB IN TRANSPORT MEDIA 3-4 HR TAT - Swab, Nasopharynx [[id number]]  (Normal) Collected: 07/04/21 0302    Lab Status: Final result Specimen: Swab from Nasopharynx Updated: 07/04/21 0408     COVID19 Not Detected     Narrative:      Fact sheet for providers: https://www.fda.gov/media/432469/download     Fact sheet for patients: https://www.fda.gov/media/991991/download  Fact sheet for providers: https://www.fda.gov/media/574979/download     Fact sheet for patients: https://www.fda.gov/media/104861/download    Blood Culture - Blood, Arm, Left [[id number]] Collected: 07/04/21 0017    Lab Status: Preliminary result Specimen: Blood from Arm, Left Updated: 07/06/21 0445     Blood Culture No growth at 2 days    Blood Culture - Blood, Arm, Right [[id number]] Collected: 07/04/21 0017    Lab Status: Preliminary result Specimen: Blood from Arm, Right Updated: 07/06/21 0445     Blood Culture No growth at 2 days              Radiology:  Imaging Results (Last 72 Hours)     Procedure Component Value Units Date/Time    CT Guided Biopsy Aspiration Injection [[id number]] Collected: 07/05/21 1220     Updated: 07/05/21 1230    Narrative:      EXAMINATION: CT GUIDED ASPIRATION-perirectal collection.     INDICATION: History of hysterectomy approximately one month prior to  presentation with recent fevers and abdominal pain found to have  multifocal fluid collections within the lower abdomen and pelvis  suspicious for abscesses. Presents to interventional radiology for  CT-guided transgluteal perirectal drain placement.     TECHNIQUE: After informed written consent, the patient was placed prone  on the CT table.  CT revealed an approximately 5.5 x 2.8 cm  collection in the pouch of Drew. This was amenable to drainage. The  patient was prepped and draped in the normal sterile fashion. 1%  lidocaine was infiltrated for local anesthesia. Under CT fluoroscopy  scopic guidance, an 18-gauge Chiba needle was advanced into the  collection. Discussion was found to be quite mobile and the needle was  unable to be advanced into the lesion proper. After 2 attempts, I  decided to abort the procedure. The patient tolerated the procedure  well.     The  radiation dose reduction device was turned on for each scan per the  ALARA (As Low as Reasonably Achievable) protocol.     COMPARISON: CT abdomen and pelvis from 7/3/2021     FINDINGS: Mobile perirectal collection possibly representing an early  abscess. Unable to be punctured due to mobility. Procedure was aborted.          Impression:      Mobile perirectal collection similar to that seen on prior  imaging. Due to mobility, procedure was aborted. Recommend follow-up  imaging in 3 days to evaluate for possible reattempt.        This report was finalized on 7/5/2021 12:27 PM by Catalina Bowman.       CT Abdomen Pelvis With Contrast [[id number]] Collected: 07/03/21 2352     Updated: 07/03/21 2354    Narrative:      CT ABDOMEN AND PELVIS WITH CONTRAST, 7/3/2021    HISTORY:  31-year-old female one month postop hysterectomy presenting to the ED complaining of low abdomen pain and decreased bowel movements.    TECHNIQUE:  CT imaging of the abdomen and pelvis with IV contrast. Radiation dose reduction techniques included automated exposure control. Radiation audit for CT and nuclear cardiology exams in the last 12 months: 0.    PELVIS FINDINGS:  Postoperative changes of hysterectomy. Edema and soft tissue stranding is seen throughout the pelvic mesentery, and there are multiple small, scattered rim-enhancing interloop fluid collections within the low pelvis suspicious for multifocal abscess. The  largest collection in the low prerectal midline measures about 5.7 cm (image 73). There is soft tissue thickening of the vaginal cuff, but there is no abscess or air collection in this location. There is no air within the urinary bladder. Rectum is  unremarkable. 3.1 cm left ovary cyst.    ABDOMEN FINDINGS:  Small bowel and colon are normal in caliber with no evidence of bowel obstruction or significant adynamic ileus. No fluid collections are seen within the abdominal peritoneal or retroperitoneal cavities.    Both kidneys are  normal in appearance with no evidence of upper urinary tract obstruction on the right or left. Normal renal parenchymal enhancement.    No gallbladder distention or bile duct dilatation. Liver, pancreas and spleen appear normal.    Lung base images show no active disease.      Impression:      1.  Postop hysterectomy with extensive peritoneal inflammation throughout the pelvis. There are multiple small peripherally enhancing interloop fluid collections scattered within the low pelvis concerning for interloop abscesses. Most of these measure  only 1 to 2 cm. Largest low midline prerectal collection measures up to 5.7 cm.  2.  Soft tissue thickening of the vaginal cuff but no obvious evidence of cuff dehiscence. There is no abscess or free air at the cuff. No air within the bladder.  3.  No evidence of upper urinary tract obstruction.    Signer Name: William Cox MD   Signed: 7/3/2021 11:52 PM   Workstation Name: [id number]    [facility] (Dr Frias) read her CT A/P and agree with the above report    Impression:   - Sepsis with fever, tachycardia, leukocytosis, and post-operative pelvic abscesses  - Multiple pelvic abscess after prior hysterectomy 5/27/21.  CT-guided aspiration of 5.7 cm abscess was aborted as it was not yet loculated and drain placement was not possible.  Can reassess for drainage in 3 days.   - Leukocytosis/neutrophilia, improved  - Pelvic pain  - Fever, improving  - Abnormal uterine bleeding/fibroid tumors s/p hysterectomy (vaginal-assisted) with ovaries retained  -hypokalemia- New. Need to replete when necessary    PLAN/RECOMMENDATIONS:   Thank you for asking us to see Anisa Sosa, I recommend the following:  - Follow blood cultures.  If I and D or drain place, send for routine culture, fungal culture and follow.  Check MRSA PCR  - Continue Zosyn for empiric IA coverage  - Continue Daptomycin 6 mg/kg IV daily to empirically cover MRSA and VRE.   Check CPK.  - If worsens, may consider antifungal coverage.  - Gynecology following.   - Could consider repeating CT abdomen and pelvis on Thursday        Titus Frias MD  7/6/2021  15:48 EDT                    Electronically signed by Titus Frias MD at 07/06/21 4940

## 2021-07-08 NOTE — PLAN OF CARE
Goal Outcome Evaluation:   Patient A&O, VSS on RA. Prn norco and dilaudid given for abdominal pain. Ate well after returning to floor from CT, to be NPO after midnight.

## 2021-07-08 NOTE — CONSULTS
Gynecologic Oncology Admission H&P     Patient Name: Anisa Sosa  : 1990   MRN: [id number]     Reason for Consultation: post op abscess from [facility], unable to be drained by IR    History of Present Illness: Anisa Sosa is a 31 y.o. female who presents with intra-abdominal abscess following an uncomplicated laparoscopic-assisted vaginal hysterectomy.  Patient is currently hospital day 5 and postop day 42 from her surgery.  When she presented here she complained of abdominal pain and imaging revealed a perirectal abscess as well as multiple areas of fluid within the bowel loops.  IR drainage was attempted x2 was unsuccessful.  She has been on IV antibiotics with some improvement in her abdominal pain.  However today preliminary read of CT abdomen/pelvis demonstrates improvement in perirectal abscess but now the patient has multiple other increasing fluid collections including pelvic sidewall fluid collections and intraloop fluid. I was consulted for assistance in management.     Past Medical History:   Past Medical History:   Diagnosis Date   • Anemia    • History of chlamydia    • History of trichomonal vaginitis        Past Surgical History:   Past Surgical History:   Procedure Laterality Date   •  SECTION     •  SECTION     •  SECTION WITH TUBAL  2013   • LAPAROSCOPIC ASSISTED VAGINAL HYSTERECTOMY  2021    for AUB-L, pelvic pain. Ovaries retained per patient. Dr. Avani Horta.        Family History:   Family History   Problem Relation Age of Onset   • Breast cancer Maternal Grandmother    • Ovarian cancer Neg Hx    • Colon cancer Neg Hx        Social History:   Social History     Socioeconomic History   • Marital status: Single     Spouse name: Not on file   • Number of children: Not on file   • Years of education: Not on file   • Highest education level: Not on file   Tobacco Use   • Smoking status: Former Smoker   • Smokeless tobacco: Never Used    • Tobacco comment: off and on for 3 years    Substance and Sexual Activity   • Alcohol use: Yes     Comment: occ   • Drug use: Never       OB History:   OB History    Para Term  AB Living   3 3 3     3   SAB TAB Ectopic Molar Multiple Live Births             3      # Outcome Date GA Lbr Adam/2nd Weight Sex Delivery Anes PTL Lv   3 Term    3402 g (7 lb 8 oz) F CS-Unspec   SRIKANTH   2 Term    3572 g (7 lb 14 oz) F CS-Unspec   SRIKANTH   1 Term    3629 g (8 lb) F CS-Unspec   SRIKANTH      Obstetric Comments   History tubal with 3rd c/s   History of chlamydia and trichomonas in early 20s      Medications:     Current Facility-Administered Medications:   •  acetaminophen (TYLENOL) tablet 650 mg, 650 mg, Oral, Q4H PRN, 650 mg at 21 0443 **OR** acetaminophen (TYLENOL) 160 MG/5ML solution 650 mg, 650 mg, Oral, Q4H PRN **OR** acetaminophen (TYLENOL) suppository 650 mg, 650 mg, Rectal, Q4H PRN, iTerney Martinez MD  •  bisacodyl (DULCOLAX) suppository 10 mg, 10 mg, Rectal, Daily PRN, Maribel Varela MD, 10 mg at 21 1506  •  DAPTOmycin (CUBICIN) 450 mg in sodium chloride 0.9 % 50 mL IVPB, 6 mg/kg (Adjusted), Intravenous, Q24H, Girish Martin PA, Last Rate: 100 mL/hr at 21 1820, 450 mg at 21 1820  •  docusate sodium (COLACE) capsule 100 mg, 100 mg, Oral, BID, Walter Prieto MD, 100 mg at 21 2243  •  enoxaparin (LOVENOX) syringe 40 mg, 40 mg, Subcutaneous, Daily, Walter Prieto MD, Stopped at 21 1422  •  HYDROcodone-acetaminophen (NORCO) 7.5-325 MG per tablet 1 tablet, 1 tablet, Oral, Q4H PRN, Maribel Varela MD, 1 tablet at 21 1257  •  HYDROmorphone (DILAUDID) injection 0.5 mg, 0.5 mg, Intravenous, Q2H PRN, Tierney Martinez MD, 0.5 mg at 21 1533  •  Magnesium Sulfate 2 gram Bolus, followed by 8 gram infusion (total Mg dose 10 grams)- Mg less than or equal to 1mg/dL, 2 g, Intravenous, PRN **OR** Magnesium Sulfate 2 gram / 50mL Infusion (GIVE X 3 BAGS  TO EQUAL 6GM TOTAL DOSE) - Mg 1.1 - 1.5 mg/dl, 2 g, Intravenous, PRN **OR** Magnesium Sulfate 4 gram infusion- Mg 1.6-1.9 mg/dL, 4 g, Intravenous, PRN, Walter Prieto MD, Last Rate: 25 mL/hr at 07/06/21 2127, 4 g at 07/06/21 2127  •  ondansetron (ZOFRAN) tablet 4 mg, 4 mg, Oral, Q6H PRN, 4 mg at 07/08/21 1257 **OR** ondansetron (ZOFRAN) injection 4 mg, 4 mg, Intravenous, Q6H PRN, Tierney Martinez MD, 4 mg at 07/07/21 0828  •  phenol (CHLORASEPTIC) 1.4 % liquid 2 spray, 2 spray, Mouth/Throat, Q2H PRN, Corin Brannon, APRN  •  piperacillin-tazobactam (ZOSYN) 4.5 g in iso-osmotic dextrose 100 mL IVPB (premix), 4.5 g, Intravenous, Q8H, Titus Frias MD, 4.5 g at 07/08/21 1410  •  polyethylene glycol (MIRALAX) packet 17 g, 17 g, Oral, Daily, Walter Prieto MD, 17 g at 07/07/21 0812  •  potassium chloride (MICRO-K) CR capsule 40 mEq, 40 mEq, Oral, PRN, 40 mEq at 07/07/21 0734 **OR** potassium chloride (KLOR-CON) packet 40 mEq, 40 mEq, Oral, PRN **OR** potassium chloride 10 mEq in 100 mL IVPB, 10 mEq, Intravenous, Q1H PRN, Walter Prieto MD  •  Sodium Chloride (PF) 0.9 % 10 mL, 10 mL, Intravenous, PRN, Tierney Martinez MD  •  sodium chloride 0.9 % flush 1-10 mL, 1-10 mL, Intravenous, PRN, Tierney Matrinez MD, 10 mL at 07/04/21 1638  •  sodium chloride 0.9 % flush 10 mL, 10 mL, Intravenous, Q12H, Tierney Martinez MD, 10 mL at 07/08/21 1422  •  sodium chloride 0.9 % infusion, 100 mL/hr, Intravenous, Continuous, Walter Prieto MD, Last Rate: 100 mL/hr at 07/07/21 2154, 100 mL/hr at 07/07/21 2154    Allergies:   No Known Allergies    Subjective      Review of Systems:   Review of Systems   Constitutional: Negative for chills, fatigue and fever.   Respiratory: Negative for cough, shortness of breath and wheezing.    Cardiovascular: Negative for chest pain, palpitations and leg swelling.   Gastrointestinal: Positive for abdominal distention and abdominal pain. Negative for constipation, diarrhea, nausea and  vomiting.   Genitourinary: Negative for dysuria, pelvic pain, urgency, vaginal bleeding, vaginal discharge and vaginal pain.   Neurological: Negative for dizziness and light-headedness.        Objective     Physical Exam:  Vital Signs:   Vitals:    07/08/21 0300 07/08/21 0500 07/08/21 0704 07/08/21 1448   BP:       BP Location:       Patient Position:       Pulse: 74 64     Resp: 18  19 18   Temp: 97.5 °F (36.4 °C)  98.8 °F (37.1 °C) 98.4 °F (36.9 °C)   TempSrc: Oral  Oral Oral   SpO2: 92% 96%     Weight:  90.3 kg (199 lb)     Height:         BMI: Body mass index is 28.55 kg/m².   Weight:   Wt Readings from Last 3 Encounters:   07/08/21 90.3 kg (199 lb)       ECOG PS: 0              Physical Exam  Vitals and nursing note reviewed.   Constitutional:       General: She is not in acute distress.     Appearance: Normal appearance. She is well-developed. She is not diaphoretic.   HENT:      Head: Normocephalic and atraumatic.      Right Ear: External ear normal.      Left Ear: External ear normal.      Nose: Nose normal.   Eyes:      General: No scleral icterus.        Right eye: No discharge.         Left eye: No discharge.      Conjunctiva/sclera: Conjunctivae normal.   Cardiovascular:      Rate and Rhythm: Normal rate and regular rhythm.      Heart sounds: Normal heart sounds. No murmur heard.     Pulmonary:      Effort: Pulmonary effort is normal. No respiratory distress.      Breath sounds: Normal breath sounds. No wheezing or rales.   Abdominal:      Palpations: Abdomen is soft.      Tenderness: There is no guarding.      Comments: Softly distended. Mildly TTP. Normoactive bowel sounds.    Musculoskeletal:         General: No swelling, tenderness, deformity or signs of injury.      Right lower leg: No edema.      Left lower leg: No edema.   Skin:     General: Skin is warm and dry.      Coloration: Skin is not jaundiced.      Findings: No lesion or rash.   Neurological:      General: No focal deficit present.       Mental Status: She is alert and oriented to person, place, and time. Mental status is at baseline.   Psychiatric:         Thought Content: Thought content normal.         Judgment: Judgment normal.         Results:   Lab Results   Component Value Date    WBC 15.28 (H) 07/08/2021    HGB 10.1 (L) 07/08/2021    HCT 31.4 (L) 07/08/2021    MCV 95.4 07/08/2021     07/08/2021       Lab Results   Component Value Date    GLUCOSE 84 07/08/2021    BUN 4 (L) 07/08/2021    CREATININE 0.62 07/08/2021    EGFRIFAFRI 135 07/08/2021    BCR 6.5 (L) 07/08/2021    K 3.8 07/08/2021    CO2 27.0 07/08/2021    CALCIUM 9.1 07/08/2021    ALBUMIN 2.60 (L) 07/07/2021    AST 11 07/07/2021    ALT 6 07/07/2021        Assessment / Plan    This is a 31 y.o. woman POD#42 s/p LAVH/BS for AUB-L, pelvic pain per patient not postoperative intra-abdominal abscesses    1.  Postoperative intra-abdominal abscesses:   -Patient initially clinically improved with IV antibiotics.  However she now continues to have abdominal pain with a increased white count today.   -IR unable to drain perirectal abscess.   -CT scan today demonstrates progression of her pelvic and interloop fluid collections.   -On review of imaging, I do not see any abscess that is accessible per vagina. Additionally, there are multiple fluid collections higher in the pelvis and abdomen.  Given the patient's continued pain and worsening leukocytosis I feel the best course of action will be to take the patient for a laparoscopic washout.  We discussed that we would ideally like to maintain the patient's ovaries.  I do not recommend removal of these unless significantly involved with infection.  We did also review the small chance of conversion to laparotomy in the event that significant adhesive disease or infection was identified.  However I am hopeful that we will be able to simply perform a washout which should help improve her clinical status.  Patient verbalizes understanding.   Patient has been added on tomorrow at midday.  She will need to be n.p.o. after midnight with anticoagulation held.    MUSTAPHA Macedo MD  Gynecologic Oncology   Date: 07/08/21  Time: 17:03 EDT    Please note that portions of this note may have been completed with a voice recognition program. Efforts were made to edit the dictations, but occasionally words are mistranscribed.

## 2021-07-09 ENCOUNTER — ANESTHESIA EVENT (OUTPATIENT)
Dept: PERIOP | Facility: HOSPITAL | Age: 31
End: 2021-07-09

## 2021-07-09 ENCOUNTER — ANESTHESIA (OUTPATIENT)
Dept: PERIOP | Facility: HOSPITAL | Age: 31
End: 2021-07-09

## 2021-07-09 LAB
ABO GROUP BLD: NORMAL
ANION GAP SERPL CALCULATED.3IONS-SCNC: 12 MMOL/L (ref 5–15)
BACTERIA SPEC AEROBE CULT: NORMAL
BACTERIA SPEC AEROBE CULT: NORMAL
BASOPHILS # BLD AUTO: 0.03 10*3/MM3 (ref 0–0.2)
BASOPHILS NFR BLD AUTO: 0.2 % (ref 0–1.5)
BLD GP AB SCN SERPL QL: NEGATIVE
BUN SERPL-MCNC: 4 MG/DL (ref 6–20)
BUN/CREAT SERPL: 6.9 (ref 7–25)
CALCIUM SPEC-SCNC: 8.5 MG/DL (ref 8.6–10.5)
CHLORIDE SERPL-SCNC: 103 MMOL/L (ref 98–107)
CO2 SERPL-SCNC: 24 MMOL/L (ref 22–29)
CREAT SERPL-MCNC: 0.58 MG/DL (ref 0.57–1)
DEPRECATED RDW RBC AUTO: 43.9 FL (ref 37–54)
EOSINOPHIL # BLD AUTO: 0.09 10*3/MM3 (ref 0–0.4)
EOSINOPHIL NFR BLD AUTO: 0.7 % (ref 0.3–6.2)
ERYTHROCYTE [DISTWIDTH] IN BLOOD BY AUTOMATED COUNT: 12.8 % (ref 12.3–15.4)
GFR SERPL CREATININE-BSD FRML MDRD: 146 ML/MIN/1.73
GLUCOSE SERPL-MCNC: 89 MG/DL (ref 65–99)
HCT VFR BLD AUTO: 29.9 % (ref 34–46.6)
HGB BLD-MCNC: 9.7 G/DL (ref 12–15.9)
IMM GRANULOCYTES # BLD AUTO: 0.11 10*3/MM3 (ref 0–0.05)
IMM GRANULOCYTES NFR BLD AUTO: 0.9 % (ref 0–0.5)
LYMPHOCYTES # BLD AUTO: 1.35 10*3/MM3 (ref 0.7–3.1)
LYMPHOCYTES NFR BLD AUTO: 10.6 % (ref 19.6–45.3)
MCH RBC QN AUTO: 30.9 PG (ref 26.6–33)
MCHC RBC AUTO-ENTMCNC: 32.4 G/DL (ref 31.5–35.7)
MCV RBC AUTO: 95.2 FL (ref 79–97)
MONOCYTES # BLD AUTO: 0.7 10*3/MM3 (ref 0.1–0.9)
MONOCYTES NFR BLD AUTO: 5.5 % (ref 5–12)
NEUTROPHILS NFR BLD AUTO: 10.41 10*3/MM3 (ref 1.7–7)
NEUTROPHILS NFR BLD AUTO: 82.1 % (ref 42.7–76)
NRBC BLD AUTO-RTO: 0 /100 WBC (ref 0–0.2)
PLATELET # BLD AUTO: 256 10*3/MM3 (ref 140–450)
PMV BLD AUTO: 10.5 FL (ref 6–12)
POTASSIUM SERPL-SCNC: 3.5 MMOL/L (ref 3.5–5.2)
RBC # BLD AUTO: 3.14 10*6/MM3 (ref 3.77–5.28)
RH BLD: POSITIVE
SODIUM SERPL-SCNC: 139 MMOL/L (ref 136–145)
T&S EXPIRATION DATE: NORMAL
WBC # BLD AUTO: 12.69 10*3/MM3 (ref 3.4–10.8)

## 2021-07-09 PROCEDURE — 25010000002 DEXAMETHASONE PER 1 MG: Performed by: NURSE ANESTHETIST, CERTIFIED REGISTERED

## 2021-07-09 PROCEDURE — 87075 CULTR BACTERIA EXCEPT BLOOD: CPT | Performed by: OBSTETRICS & GYNECOLOGY

## 2021-07-09 PROCEDURE — 25010000002 DAPTOMYCIN PER 1 MG: Performed by: PHYSICIAN ASSISTANT

## 2021-07-09 PROCEDURE — 86900 BLOOD TYPING SEROLOGIC ABO: CPT | Performed by: OBSTETRICS & GYNECOLOGY

## 2021-07-09 PROCEDURE — 99231 SBSQ HOSP IP/OBS SF/LOW 25: CPT | Performed by: OBSTETRICS & GYNECOLOGY

## 2021-07-09 PROCEDURE — 3E1M38Z IRRIGATION OF PERITONEAL CAVITY USING IRRIGATING SUBSTANCE, PERCUTANEOUS APPROACH: ICD-10-PCS | Performed by: OBSTETRICS & GYNECOLOGY

## 2021-07-09 PROCEDURE — 25010000002 NEOSTIGMINE 10 MG/10ML SOLUTION: Performed by: NURSE ANESTHETIST, CERTIFIED REGISTERED

## 2021-07-09 PROCEDURE — 87070 CULTURE OTHR SPECIMN AEROBIC: CPT | Performed by: OBSTETRICS & GYNECOLOGY

## 2021-07-09 PROCEDURE — S0260 H&P FOR SURGERY: HCPCS | Performed by: OBSTETRICS & GYNECOLOGY

## 2021-07-09 PROCEDURE — 85025 COMPLETE CBC W/AUTO DIFF WBC: CPT | Performed by: INTERNAL MEDICINE

## 2021-07-09 PROCEDURE — 25010000002 HYDROMORPHONE PER 4 MG: Performed by: INTERNAL MEDICINE

## 2021-07-09 PROCEDURE — 25010000002 PIPERACILLIN SOD-TAZOBACTAM PER 1 G: Performed by: INTERNAL MEDICINE

## 2021-07-09 PROCEDURE — 25010000003 LIDOCAINE 1 % SOLUTION: Performed by: NURSE ANESTHETIST, CERTIFIED REGISTERED

## 2021-07-09 PROCEDURE — 25010000002 ONDANSETRON PER 1 MG: Performed by: NURSE ANESTHETIST, CERTIFIED REGISTERED

## 2021-07-09 PROCEDURE — 86901 BLOOD TYPING SEROLOGIC RH(D): CPT | Performed by: OBSTETRICS & GYNECOLOGY

## 2021-07-09 PROCEDURE — 87205 SMEAR GRAM STAIN: CPT | Performed by: OBSTETRICS & GYNECOLOGY

## 2021-07-09 PROCEDURE — 49329 UNLSTD LAPS PX ABD PERTM&OMN: CPT | Performed by: OBSTETRICS & GYNECOLOGY

## 2021-07-09 PROCEDURE — 49084 PERITONEAL LAVAGE: CPT | Performed by: OBSTETRICS & GYNECOLOGY

## 2021-07-09 PROCEDURE — 87077 CULTURE AEROBIC IDENTIFY: CPT | Performed by: OBSTETRICS & GYNECOLOGY

## 2021-07-09 PROCEDURE — 80048 BASIC METABOLIC PNL TOTAL CA: CPT | Performed by: INTERNAL MEDICINE

## 2021-07-09 PROCEDURE — 25010000002 PROPOFOL 10 MG/ML EMULSION: Performed by: NURSE ANESTHETIST, CERTIFIED REGISTERED

## 2021-07-09 PROCEDURE — 25010000002 HYDROMORPHONE PER 4 MG: Performed by: NURSE ANESTHETIST, CERTIFIED REGISTERED

## 2021-07-09 PROCEDURE — 86850 RBC ANTIBODY SCREEN: CPT | Performed by: OBSTETRICS & GYNECOLOGY

## 2021-07-09 PROCEDURE — 99232 SBSQ HOSP IP/OBS MODERATE 35: CPT | Performed by: INTERNAL MEDICINE

## 2021-07-09 PROCEDURE — 25010000002 MIDAZOLAM PER 1 MG: Performed by: NURSE ANESTHETIST, CERTIFIED REGISTERED

## 2021-07-09 PROCEDURE — 25010000002 FENTANYL CITRATE (PF) 50 MCG/ML SOLUTION: Performed by: NURSE ANESTHETIST, CERTIFIED REGISTERED

## 2021-07-09 RX ORDER — DEXAMETHASONE SODIUM PHOSPHATE 4 MG/ML
INJECTION, SOLUTION INTRA-ARTICULAR; INTRALESIONAL; INTRAMUSCULAR; INTRAVENOUS; SOFT TISSUE AS NEEDED
Status: DISCONTINUED | OUTPATIENT
Start: 2021-07-09 | End: 2021-07-09 | Stop reason: SURG

## 2021-07-09 RX ORDER — MAGNESIUM SULFATE HEPTAHYDRATE 40 MG/ML
4 INJECTION, SOLUTION INTRAVENOUS AS NEEDED
Status: DISCONTINUED | OUTPATIENT
Start: 2021-07-09 | End: 2021-07-12 | Stop reason: HOSPADM

## 2021-07-09 RX ORDER — ONDANSETRON 2 MG/ML
INJECTION INTRAMUSCULAR; INTRAVENOUS AS NEEDED
Status: DISCONTINUED | OUTPATIENT
Start: 2021-07-09 | End: 2021-07-09 | Stop reason: SURG

## 2021-07-09 RX ORDER — LIDOCAINE HYDROCHLORIDE 10 MG/ML
INJECTION, SOLUTION INFILTRATION; PERINEURAL AS NEEDED
Status: DISCONTINUED | OUTPATIENT
Start: 2021-07-09 | End: 2021-07-09 | Stop reason: SURG

## 2021-07-09 RX ORDER — PROPOFOL 10 MG/ML
VIAL (ML) INTRAVENOUS AS NEEDED
Status: DISCONTINUED | OUTPATIENT
Start: 2021-07-09 | End: 2021-07-09 | Stop reason: SURG

## 2021-07-09 RX ORDER — SODIUM CHLORIDE 9 MG/ML
INJECTION, SOLUTION INTRAVENOUS AS NEEDED
Status: DISCONTINUED | OUTPATIENT
Start: 2021-07-09 | End: 2021-07-09 | Stop reason: HOSPADM

## 2021-07-09 RX ORDER — ONDANSETRON 2 MG/ML
4 INJECTION INTRAMUSCULAR; INTRAVENOUS EVERY 6 HOURS PRN
Status: DISCONTINUED | OUTPATIENT
Start: 2021-07-09 | End: 2021-07-12 | Stop reason: HOSPADM

## 2021-07-09 RX ORDER — FAMOTIDINE 20 MG/1
20 TABLET, FILM COATED ORAL ONCE
Status: COMPLETED | OUTPATIENT
Start: 2021-07-09 | End: 2021-07-09

## 2021-07-09 RX ORDER — ONDANSETRON 4 MG/1
4 TABLET, FILM COATED ORAL EVERY 6 HOURS PRN
Status: DISCONTINUED | OUTPATIENT
Start: 2021-07-09 | End: 2021-07-12 | Stop reason: HOSPADM

## 2021-07-09 RX ORDER — GLYCOPYRROLATE 0.2 MG/ML
INJECTION INTRAMUSCULAR; INTRAVENOUS AS NEEDED
Status: DISCONTINUED | OUTPATIENT
Start: 2021-07-09 | End: 2021-07-09 | Stop reason: SURG

## 2021-07-09 RX ORDER — DROPERIDOL 2.5 MG/ML
0.62 INJECTION, SOLUTION INTRAMUSCULAR; INTRAVENOUS ONCE AS NEEDED
Status: DISCONTINUED | OUTPATIENT
Start: 2021-07-09 | End: 2021-07-09 | Stop reason: HOSPADM

## 2021-07-09 RX ORDER — FENTANYL CITRATE 50 UG/ML
50 INJECTION, SOLUTION INTRAMUSCULAR; INTRAVENOUS
Status: DISCONTINUED | OUTPATIENT
Start: 2021-07-09 | End: 2021-07-09 | Stop reason: HOSPADM

## 2021-07-09 RX ORDER — ACETAMINOPHEN 325 MG/1
650 TABLET ORAL EVERY 6 HOURS PRN
Status: DISCONTINUED | OUTPATIENT
Start: 2021-07-09 | End: 2021-07-12 | Stop reason: HOSPADM

## 2021-07-09 RX ORDER — FENTANYL CITRATE 50 UG/ML
INJECTION, SOLUTION INTRAMUSCULAR; INTRAVENOUS AS NEEDED
Status: DISCONTINUED | OUTPATIENT
Start: 2021-07-09 | End: 2021-07-09 | Stop reason: SDUPTHER

## 2021-07-09 RX ORDER — DOCUSATE SODIUM 100 MG/1
100 CAPSULE, LIQUID FILLED ORAL 2 TIMES DAILY
Status: DISCONTINUED | OUTPATIENT
Start: 2021-07-09 | End: 2021-07-12 | Stop reason: HOSPADM

## 2021-07-09 RX ORDER — HYDROMORPHONE HYDROCHLORIDE 1 MG/ML
0.5 INJECTION, SOLUTION INTRAMUSCULAR; INTRAVENOUS; SUBCUTANEOUS
Status: DISCONTINUED | OUTPATIENT
Start: 2021-07-09 | End: 2021-07-09 | Stop reason: HOSPADM

## 2021-07-09 RX ORDER — ROCURONIUM BROMIDE 10 MG/ML
INJECTION, SOLUTION INTRAVENOUS AS NEEDED
Status: DISCONTINUED | OUTPATIENT
Start: 2021-07-09 | End: 2021-07-09 | Stop reason: SURG

## 2021-07-09 RX ORDER — POLYETHYLENE GLYCOL 3350 17 G/17G
17 POWDER, FOR SOLUTION ORAL DAILY
Status: DISCONTINUED | OUTPATIENT
Start: 2021-07-10 | End: 2021-07-12 | Stop reason: HOSPADM

## 2021-07-09 RX ORDER — MAGNESIUM SULFATE HEPTAHYDRATE 40 MG/ML
2 INJECTION, SOLUTION INTRAVENOUS AS NEEDED
Status: DISCONTINUED | OUTPATIENT
Start: 2021-07-09 | End: 2021-07-12 | Stop reason: HOSPADM

## 2021-07-09 RX ORDER — NEOSTIGMINE METHYLSULFATE 1 MG/ML
INJECTION, SOLUTION INTRAVENOUS AS NEEDED
Status: DISCONTINUED | OUTPATIENT
Start: 2021-07-09 | End: 2021-07-09 | Stop reason: SURG

## 2021-07-09 RX ORDER — MAGNESIUM HYDROXIDE 1200 MG/15ML
LIQUID ORAL AS NEEDED
Status: DISCONTINUED | OUTPATIENT
Start: 2021-07-09 | End: 2021-07-09 | Stop reason: HOSPADM

## 2021-07-09 RX ORDER — BUPIVACAINE HYDROCHLORIDE AND EPINEPHRINE 5; 5 MG/ML; UG/ML
INJECTION, SOLUTION PERINEURAL AS NEEDED
Status: DISCONTINUED | OUTPATIENT
Start: 2021-07-09 | End: 2021-07-09 | Stop reason: HOSPADM

## 2021-07-09 RX ORDER — POLYETHYLENE GLYCOL 3350 17 G/17G
17 POWDER, FOR SOLUTION ORAL DAILY
Status: DISCONTINUED | OUTPATIENT
Start: 2021-07-09 | End: 2021-07-09

## 2021-07-09 RX ORDER — MIDAZOLAM HYDROCHLORIDE 1 MG/ML
INJECTION INTRAMUSCULAR; INTRAVENOUS AS NEEDED
Status: DISCONTINUED | OUTPATIENT
Start: 2021-07-09 | End: 2021-07-09 | Stop reason: SURG

## 2021-07-09 RX ORDER — SODIUM CHLORIDE, SODIUM LACTATE, POTASSIUM CHLORIDE, CALCIUM CHLORIDE 600; 310; 30; 20 MG/100ML; MG/100ML; MG/100ML; MG/100ML
9 INJECTION, SOLUTION INTRAVENOUS CONTINUOUS
Status: DISCONTINUED | OUTPATIENT
Start: 2021-07-09 | End: 2021-07-11

## 2021-07-09 RX ORDER — SODIUM CHLORIDE 9 MG/ML
10 INJECTION INTRAVENOUS EVERY 12 HOURS SCHEDULED
Status: DISCONTINUED | OUTPATIENT
Start: 2021-07-09 | End: 2021-07-09

## 2021-07-09 RX ORDER — HYDROCODONE BITARTRATE AND ACETAMINOPHEN 7.5; 325 MG/1; MG/1
1 TABLET ORAL EVERY 4 HOURS PRN
Status: DISCONTINUED | OUTPATIENT
Start: 2021-07-09 | End: 2021-07-12

## 2021-07-09 RX ORDER — LABETALOL HYDROCHLORIDE 5 MG/ML
5 INJECTION, SOLUTION INTRAVENOUS
Status: DISCONTINUED | OUTPATIENT
Start: 2021-07-09 | End: 2021-07-09 | Stop reason: HOSPADM

## 2021-07-09 RX ORDER — SODIUM CHLORIDE 9 MG/ML
10 INJECTION INTRAVENOUS EVERY 12 HOURS SCHEDULED
Status: DISCONTINUED | OUTPATIENT
Start: 2021-07-09 | End: 2021-07-12 | Stop reason: HOSPADM

## 2021-07-09 RX ADMIN — NEOSTIGMINE 3 MG: 1 INJECTION INTRAVENOUS at 15:25

## 2021-07-09 RX ADMIN — SODIUM CHLORIDE, PRESERVATIVE FREE 10 ML: 5 INJECTION INTRAVENOUS at 12:25

## 2021-07-09 RX ADMIN — SODIUM CHLORIDE, POTASSIUM CHLORIDE, SODIUM LACTATE AND CALCIUM CHLORIDE 9 ML/HR: 600; 310; 30; 20 INJECTION, SOLUTION INTRAVENOUS at 12:25

## 2021-07-09 RX ADMIN — DOCUSATE SODIUM 100 MG: 100 CAPSULE, LIQUID FILLED ORAL at 20:02

## 2021-07-09 RX ADMIN — TAZOBACTAM SODIUM AND PIPERACILLIN SODIUM 4.5 G: 500; 4 INJECTION, SOLUTION INTRAVENOUS at 20:03

## 2021-07-09 RX ADMIN — DEXAMETHASONE SODIUM PHOSPHATE 8 MG: 4 INJECTION, SOLUTION INTRA-ARTICULAR; INTRALESIONAL; INTRAMUSCULAR; INTRAVENOUS; SOFT TISSUE at 14:51

## 2021-07-09 RX ADMIN — HYDROCODONE BITARTRATE AND ACETAMINOPHEN 1 TABLET: 7.5; 325 TABLET ORAL at 21:43

## 2021-07-09 RX ADMIN — HYDROCODONE BITARTRATE AND ACETAMINOPHEN 1 TABLET: 7.5; 325 TABLET ORAL at 17:38

## 2021-07-09 RX ADMIN — GLYCOPYRROLATE 0.4 MG: 0.4 INJECTION INTRAMUSCULAR; INTRAVENOUS at 15:25

## 2021-07-09 RX ADMIN — SODIUM CHLORIDE, POTASSIUM CHLORIDE, SODIUM LACTATE AND CALCIUM CHLORIDE: 600; 310; 30; 20 INJECTION, SOLUTION INTRAVENOUS at 15:47

## 2021-07-09 RX ADMIN — FAMOTIDINE 20 MG: 20 TABLET ORAL at 12:40

## 2021-07-09 RX ADMIN — TAZOBACTAM SODIUM AND PIPERACILLIN SODIUM 4.5 G: 500; 4 INJECTION, SOLUTION INTRAVENOUS at 12:41

## 2021-07-09 RX ADMIN — DAPTOMYCIN 450 MG: 500 INJECTION, POWDER, LYOPHILIZED, FOR SOLUTION INTRAVENOUS at 17:59

## 2021-07-09 RX ADMIN — MIDAZOLAM HYDROCHLORIDE 2 MG: 1 INJECTION, SOLUTION INTRAMUSCULAR; INTRAVENOUS at 14:35

## 2021-07-09 RX ADMIN — SODIUM CHLORIDE 100 ML/HR: 9 INJECTION, SOLUTION INTRAVENOUS at 04:17

## 2021-07-09 RX ADMIN — HYDROCODONE BITARTRATE AND ACETAMINOPHEN 1 TABLET: 7.5; 325 TABLET ORAL at 04:17

## 2021-07-09 RX ADMIN — ONDANSETRON 4 MG: 2 INJECTION INTRAMUSCULAR; INTRAVENOUS at 15:24

## 2021-07-09 RX ADMIN — POTASSIUM CHLORIDE 40 MEQ: 750 CAPSULE, EXTENDED RELEASE ORAL at 21:44

## 2021-07-09 RX ADMIN — HYDROMORPHONE HYDROCHLORIDE 0.5 MG: 1 INJECTION, SOLUTION INTRAMUSCULAR; INTRAVENOUS; SUBCUTANEOUS at 16:05

## 2021-07-09 RX ADMIN — LIDOCAINE HYDROCHLORIDE 50 MG: 10 INJECTION, SOLUTION INFILTRATION; PERINEURAL at 14:40

## 2021-07-09 RX ADMIN — SODIUM CHLORIDE 100 ML/HR: 9 INJECTION, SOLUTION INTRAVENOUS at 17:59

## 2021-07-09 RX ADMIN — HYDROCODONE BITARTRATE AND ACETAMINOPHEN 1 TABLET: 7.5; 325 TABLET ORAL at 11:28

## 2021-07-09 RX ADMIN — HYDROMORPHONE HYDROCHLORIDE 0.5 MG: 1 INJECTION, SOLUTION INTRAMUSCULAR; INTRAVENOUS; SUBCUTANEOUS at 00:55

## 2021-07-09 RX ADMIN — TAZOBACTAM SODIUM AND PIPERACILLIN SODIUM 4.5 G: 500; 4 INJECTION, SOLUTION INTRAVENOUS at 05:12

## 2021-07-09 RX ADMIN — HYDROMORPHONE HYDROCHLORIDE 0.5 MG: 1 INJECTION, SOLUTION INTRAMUSCULAR; INTRAVENOUS; SUBCUTANEOUS at 11:28

## 2021-07-09 RX ADMIN — HYDROMORPHONE HYDROCHLORIDE 0.5 MG: 1 INJECTION, SOLUTION INTRAMUSCULAR; INTRAVENOUS; SUBCUTANEOUS at 15:55

## 2021-07-09 RX ADMIN — ROCURONIUM BROMIDE 50 MG: 10 INJECTION, SOLUTION INTRAVENOUS at 14:40

## 2021-07-09 RX ADMIN — PROPOFOL 200 MG: 10 INJECTION, EMULSION INTRAVENOUS at 14:40

## 2021-07-09 RX ADMIN — FENTANYL CITRATE 100 MCG: 50 INJECTION, SOLUTION INTRAMUSCULAR; INTRAVENOUS at 14:40

## 2021-07-09 NOTE — ANESTHESIA PROCEDURE NOTES
Airway  Urgency: elective    Date/Time: 7/9/2021 2:42 PM  Airway not difficult    General Information and Staff    Patient location during procedure: OR  CRNA: Emili Arceo CRNA    Indications and Patient Condition  Indications for airway management: airway protection    Preoxygenated: yes  MILS maintained throughout  Mask difficulty assessment: 2 - vent by mask + OA or adjuvant +/- NMBA    Final Airway Details  Final airway type: endotracheal airway      Successful airway: ETT  Cuffed: yes   Successful intubation technique: direct laryngoscopy  Endotracheal tube insertion site: oral  Blade: Rosa  Blade size: 2  ETT size (mm): 7.0  Cormack-Lehane Classification: grade I - full view of glottis  Placement verified by: chest auscultation and capnometry   Measured from: lips  ETT/EBT  to lips (cm): 21  Number of attempts at approach: 1  Assessment: lips, teeth, and gum same as pre-op and atraumatic intubation    Additional Comments  Pt to OR 4. Pt moved self to OR table. ASA monitors placed. Pre-O2 with 100% Oxygen. SIVI. Atraumatic intubation. +ETCO2, +BBS.

## 2021-07-09 NOTE — ANESTHESIA PREPROCEDURE EVALUATION
Anesthesia Evaluation     Patient summary reviewed and Nursing notes reviewed   no history of anesthetic complications:  NPO Solid Status: > 8 hours  NPO Liquid Status: > 2 hours           Airway   Mallampati: I  TM distance: >3 FB  Neck ROM: full  No difficulty expected  Dental          Pulmonary     breath sounds clear to auscultation  (+) a smoker Former,   Cardiovascular     Rhythm: regular  Rate: normal        Neuro/Psych  GI/Hepatic/Renal/Endo      Musculoskeletal     Abdominal    Substance History      OB/GYN          Other                      Anesthesia Plan    ASA 2     general     intravenous induction     Anesthetic plan, all risks, benefits, and alternatives have been provided, discussed and informed consent has been obtained with: patient.    Plan discussed with CRNA.

## 2021-07-09 NOTE — OP NOTE
Quiana Sosa  : 1990  MRN: [id number]  CSN: [id number]  Date: 2021    Operative Note    DIAGNOSTIC LAPAROSCOPY     Pre-op Diagnosis:  Intra-abdominal abscess post-procedure [T81.43XA]  Lower abdominal pain [R10.30]  Status post hysterectomy [Z90.710]   Post-op Diagnosis:  Same   Procedure: 1. Diagnostic laparoscopy with peritoneal lavage with 1L of saline with polymycin   Surgeon: Amee Robertson MD     Assist: Angie Macedo MD     was responsible for performing the following activities: Retraction, Suction, Irrigation, Suturing and Closing and their skilled assistance was necessary for the success of this case.     Anesthesia: General   Estimated Blood Loss: Minimal    Fluids: 1000 mls   UOP: 475 mls   ABx: Zosyn and Daptomycin    Specimens:  None   Findings:  Colon and small bowel adhesions to anterior abdominal wall with inability to visualize the lower abdomen and pelvis.  Vaginal cuff felt intact but indurated with small amount of spontaneous drainage of fluid that was sent for culture.   Complications:  None   Indications for procedure -patient is a 31-year-old who initially presented to the emergency room here at [facility] about a month out from an [facility]/ with diagnosis of multiple pelvic abscesses.  She was on IV antibiotics for 6 days and IR was unable to drain these abscesses.  She continued to have abdominal pain so was consented for the above procedure.       Description of Procedure   Patient was taken to the operating room where she is placed in the dorsal supine position and general endotracheal anesthesia was administered without difficulty.  She was then placed in the dorsal lithotomy position and her arms were tucked at the side.  She was then prepped and draped in the normal sterile fashion.  A proper timeout was performed.  A Menon catheter was placed.  Attention was then turned to the abdomen.  Half percent Marcaine with epinephrine was instilled just below the  umbilicus.  A 5 mm vertical incision was made over her previous incision just below the umbilicus.  The abdomen was then entered with a 5 mm Optiview scope.  The abdomen was then insufflated.  There appeared to be multiple colon and small bowel adhesions to anterior abdominal wall that obliterated any view of the lower abdomen and pelvis.  Attention was turned to the left lower quadrant and half percent Marcaine with epinephrine was instilled over a previous port site.  A 5 mm incision was made and a 5 mm trocar was inserted under direct visualization.  The same was done in the right lower quadrant.  A [place] was used to try to take down some of the adhesions but it appeared that the bowel itself was involved in some of these adhesions.  At this time the decision was made to proceed with a peritoneal lavage with 1L of saline with polymycin.  The pelvis lower and upper abdomen were copiously irrigated with antibiotic solution.  This was then suctioned back out.  At this time the procedure was aborted.  Gas was turned off and the trochars were removed without difficulty.  The port site incisions were closed with 2-0 Monocryl suture and covered with Mastisol Steri's and Tegaderm.  Attention was then turned to the perineum and the cuff was difficult to visualize but appeared and felt intact but was very indurated.  There was some spontaneous cream drainage from the vagina that was sent for culture.  Menon catheter was removed without difficulty.  Patient was placed back in the dorsal supine position and she was extubated without difficulty.  She was taken to the PACU in stable condition.  Plan will be to continue IV daptomycin and Zosyn and tailor antibiotics by culture and ID recommendations.    Amee Robertson MD   7/9/2021  15:55 EDT

## 2021-07-09 NOTE — ANESTHESIA POSTPROCEDURE EVALUATION
Patient: Anisa Sosa    Procedure Summary     Date: 07/09/21 Room / Location:  [facility] /  [facility]    Anesthesia Start: 1434 Anesthesia Stop:     Procedure: DIAGNOSTIC LAPAROSCOPY WITH WASHOUT (N/A Abdomen) Diagnosis:       Intra-abdominal abscess post-procedure      Lower abdominal pain      Status post hysterectomy      (Intra-abdominal abscess post-procedure [T81.43XA])      (Lower abdominal pain [R10.30])      (Status post hysterectomy [Z90.710])    Surgeons: Amee Robertson MD Provider: Kevin Mcfadden MD    Anesthesia Type: general ASA Status: 2          Anesthesia Type: general    Vitals  Vitals Value Taken Time   /95 07/09/21 1551   Temp 97.2 °F (36.2 °C) 07/09/21 1551   Pulse 62 07/09/21 1551   Resp 28 07/09/21 1551   SpO2 94 % 07/09/21 1551           Post Anesthesia Care and Evaluation    Patient location during evaluation: PACU  Patient participation: complete - patient cannot participate  Level of consciousness: awake and responsive to verbal stimuli  Pain score: 3  Pain management: adequate  Airway patency: patent  Anesthetic complications: No anesthetic complications  PONV Status: none  Cardiovascular status: stable  Respiratory status: acceptable, nasal cannula and spontaneous ventilation  Hydration status: stable    Comments: Pt transferred to PACU with O2. Vital signs stable. Report to PACU RN and care accepted.

## 2021-07-09 NOTE — PROGRESS NOTES
BERNABE Araya  Anisa Sosa  : 1990  MRN: [id number]  CSN: [id number]  Hospital Day # 6     POD#43 s/p LAVH/BS at outside hospital   CC: hospital follow up of post op pelvic abscesses     Subjective   She is ready for procedure today. Still having pain and requiring oral and IV pain meds. Tolerated regular diet yesterday evening. Passing flatus. One BM this week after suppository. Afebrile overnight      Objective     Min/max vitals past 24 hours:   Temp  Min: 98 °F (36.7 °C)  Max: 98.5 °F (36.9 °C)  BP  Min: 116/80  Max: 131/86  Pulse  Min: 66  Max: 80  Resp  Min: 15  Max: 18        I/O last 3 completed shifts:  In: 2305 [P.O.:1005; I.V.:1000; IV Piggyback:300]  Out: -     General: well developed; well nourished  no acute distress   Abdomen: soft, mildly TTP in lower quadrants   no umbilical or inguinal hernias are present  no hepato-splenomegaly  incision is clean, dry, intact and without drainage   Pelvic: Not performed   Ext: Calves NT       CBC w/ diff:   Lab Results   Component Value Date     2021    HGB 9.7 (L) 2021    HCT 29.9 (L) 2021    MCV 95.2 2021    RDW 12.8 2021    WBC 12.69 (H) 2021    NEUTRORELPCT 82.1 (H) 2021    AUTOIGPER 0.9 (H) 2021    LYMPHORELPCT 10.6 (L) 2021    MONORELPCT 5.5 2021    EOSRELPCT 0.7 2021    BASORELPCT 0.2 2021     CMP:   Lab Results   Component Value Date     2021    K 3.5 2021     2021    CO2 24.0 2021    BUN 4 (L) 2021    CREATININE 0.58 2021    GLUCOSE 89 2021    ALBUMIN 2.60 (L) 2021    CALCIUM 8.5 (L) 2021    AST 11 2021    ALT 6 2021    BILITOT 0.3 2021     UA:    Lab Results   Component Value Date    SQURAISAUA 3-6 (A) 2021    SPECGRAVUR 1.026 2021    KETONESU Negative 2021    BLOODU Negative 2021    LEUKOCYTESUR Negative 2021    NITRITEU Negative 2021    RBCUA  3-6 (A) 07/04/2021    WBCUA 3-5 (A) 07/04/2021    BACTERIA None Seen 07/04/2021     Urine Culture: No results found for: URINECX       Assessment   1. POD#43 s/p LAVH/BS for AUB-L, pelvic pain per patient.   2. Post operative pelvic abscesses      Plan   1. OR today for laparoscopic washout with Dr. Macdeo with gyn onc. All questions answered.     Amee Robertson MD  7/9/2021  08:37 EDT

## 2021-07-09 NOTE — PROGRESS NOTES
Assisted Dr. Robertson with diagnostic laparoscopy and washout. No purulent noted in abdomen but pelvis completely obscured by dense adhesions of the small bowel to the anterior abdominal wall and colon. Unable to visualize ovaries. Given her extensive adhesions, we performed peritoneal lavage with 1L of saline with polymycin. Of note, I did examine the patient's vaginal cuff. The cuff was indurated and there was thick white drainage in vault. This was sent for culture. There was no myra defect noted. Due to the extensive induration and the bowel adhesions at the top of the cuff, I did not disrupt the cuff at this time,.    Recommend antibiosis per ID recommendations. If the patient needs further pelvic surgery in the future, she will likely need an exploratory laparotomy due to the adhesive disease seen today. At this time I will sign off but am available by phone if questions or concerns arise.    Electronically signed by Angie Macedo MD, 07/09/21, 4:01 PM EDT.

## 2021-07-09 NOTE — PROGRESS NOTES
[facility] Medicine Services  PROGRESS NOTE    Patient Name: Anisa Sosa  : 1990  MRN: [id number]    Date of Admission: 7/3/2021  Primary Care Physician: Provider, No Known    Subjective   Subjective     CC:  Abdominal pain    HPI:   Doing well this am, but had some uncontrolled pain overnight. Nervous about going to the OR today.     ROS:  Gen- no fevers, chills  CV- No chest pain, palpitations  Resp- No cough, dyspnea  GI- + abd pain        Objective   Objective     Vital Signs:   Temp:  [98 °F (36.7 °C)-98.5 °F (36.9 °C)] 98.5 °F (36.9 °C)  Heart Rate:  [66-80] 66  Resp:  [15-18] 16  BP: (116-131)/(80-91) 131/86        Physical Exam:  Constitutional - no acute distress, nontoxic, in bed  HEENT-NCAT, mucous membranes moist  CV-RRR, S1 S2 normal, no m/r/g  Resp-CTAB, no wheezes, rhonchi or rales  Abd-soft, mild lower quadrant tenderness, nondistended, normoactive bowel sounds  Ext-No lower extremity cyanosis, clubbing or edema bilaterally  Neuro-alert and oriented x 3, speech clear, moves all extremities   Psych-normal affect   Skin- No rash on exposed UE or LE bilaterally    Results Reviewed:  Results from last 7 days   Lab Units 21  0552 21  0704 21  0612 21  0756 21  0518 21  2230   WBC 10*3/mm3 12.69* 15.28* 11.14* 10.08  --  14.40*   HEMOGLOBIN g/dL 9.7* 10.1* 9.8* 11.0*  --  12.2   HEMATOCRIT % 29.9* 31.4* 30.6* 33.5*  --  37.2   PLATELETS 10*3/mm3 256 233 184 170  --  175   INR   --   --   --   --  1.22*  --    PROCALCITONIN ng/mL  --   --   --  0.21  --  0.30*     Results from last 7 days   Lab Units 21  0552 21  0704 21  2335 21  0612 21  0713 21  2230   SODIUM mmol/L 139 143  --  135* 137 139   POTASSIUM mmol/L 3.5 3.8 3.7 3.3* 2.6* 3.5   CHLORIDE mmol/L 103 106  --  98 98 100   CO2 mmol/L 24.0 27.0  --  29.0 31.0* 28.0   BUN mg/dL 4* 4*  --  3* 2* 6   CREATININE mg/dL 0.58 0.62  --  0.63 0.62  0.72   GLUCOSE mg/dL 89 84  --  121* 98 99   CALCIUM mg/dL 8.5* 9.1  --  8.6 8.7 9.6   ALT (SGPT) U/L  --   --   --  6 6 9   AST (SGOT) U/L  --   --   --  11 13 13     Estimated Creatinine Clearance: 170.1 mL/min (by C-G formula based on SCr of 0.58 mg/dL).    Microbiology Results Abnormal     Procedure Component Value - Date/Time    Blood Culture - Blood, Arm, Right [[id number]] Collected: 07/04/21 0017    Lab Status: Final result Specimen: Blood from Arm, Right Updated: 07/09/21 0445     Blood Culture No growth at 5 days    Blood Culture - Blood, Arm, Left [[id number]] Collected: 07/04/21 0017    Lab Status: Final result Specimen: Blood from Arm, Left Updated: 07/09/21 0445     Blood Culture No growth at 5 days    MRSA Screen, PCR (Inpatient) - Swab, Nares [[id number]]  (Normal) Collected: 07/05/21 1828    Lab Status: Final result Specimen: Swab from Nares Updated: 07/05/21 2036     MRSA PCR Negative    Narrative:      MRSA Negative    COVID PRE-OP / PRE-PROCEDURE SCREENING ORDER (NO ISOLATION) - Swab, Nasopharynx [[id number]]  (Normal) Collected: 07/04/21 0302    Lab Status: Final result Specimen: Swab from Nasopharynx Updated: 07/04/21 0408    Narrative:      The following orders were created for panel order COVID PRE-OP / PRE-PROCEDURE SCREENING ORDER (NO ISOLATION) - Swab, Nasopharynx.  Procedure                               Abnormality         Status                     ---------                               -----------         ------                     COVID-19,CEPHEID,JORGE IN-...[[id number]]  Normal              Final result                 Please view results for these tests on the individual orders.    COVID-19,CEPHEID,JORGE IN-HOUSE(OR EMERGENT/ADD-ON),NP SWAB IN TRANSPORT MEDIA 3-4 HR TAT - Swab, Nasopharynx [[id number]]  (Normal) Collected: 07/04/21 0302    Lab Status: Final result Specimen: Swab from Nasopharynx Updated: 07/04/21 0408     COVID19 Not Detected    Narrative:      Fact sheet for providers:  https://www.fda.gov/media/437687/download     Fact sheet for patients: https://www.fda.gov/media/177849/download  Fact sheet for providers: https://www.fda.gov/media/673639/download     Fact sheet for patients: https://www.fda.gov/media/524542/download          Imaging Results (Last 24 Hours)     Procedure Component Value Units Date/Time    CT Abdomen Pelvis With Contrast [[id number]] Collected: 07/08/21 1159     Updated: 07/08/21 2151    Narrative:      EXAMINATION: CT ABDOMEN AND PELVIS W CONTRAST-      INDICATION: Abdominal abscess/infection suspected; T81.43XA-Infection  following a procedure, organ and space surgical site, initial encounter;  R10.30-Lower abdominal pain, unspecified; Z90.710-Acquired absence of  both cervix and uterus.     TECHNIQUE: 5 mm post IV contrast portal venous phase and delayed venous  phase images through the abdomen and pelvis.     The radiation dose reduction device was turned on for each scan per the  ALARA (As Low as Reasonably Achievable) protocol.     COMPARISON: Abdomen and pelvis CT scan 07/03/2021.     FINDINGS: Patient history indicates prior hysterectomy, the 07/03/2021  exam report indicated extensive peritoneal inflammation, multiple small  peripherally enhancing interloop fluid collections concerning for  abscess, largest 1 to 2 cm, with a pre-rectal collection just under 6  cm, subsequent aspirated under CT guidance.     Today's study shows this collection to appear very small,  nonair-containing with no obvious enhancement, 3.6 x 1.5 cm in maximal  dimensions. Left pelvic sidewall collection appears increased, from 3.1  x 2.6 m on 07/03/2021 to approximately 4.9 x 2.7 cm today. At the same  axial level, a small central pelvic fluid and air collection, 16 mm in  diameter has increased to 3.8 cm in diameter. There is a suggestion of  several other fluid collections between bowel loops at approximately the  same level in the central pelvis, but these are difficult to  reliably  distinguish from fluid-containing small bowel as is seen elsewhere. Of  these, a central low-density area in the pelvis, image 68 series 2 is  thought to represent a 26 mm collection previously 11 mm. Dorsally  adjacent collection approximately 2.7 cm appears to be increased from  1.5 cm. Neither collection contains air. Inflammatory fat stranding of  the pelvis is similar in extent to prior exam. No free fluid is seen.  Area of the vaginal cuff remains somewhat thickened but stable. No  hematoma is seen. Bladder is normally distended and normal in  appearance.     Elsewhere, there is mild new right lower lobe discoid atelectasis and  trace effusion and minimal left lower lobe discoid atelectasis. There is  extensive fatty liver change. No hepatic lesions are identified. No  significant abnormalities are seen of the spleen, pancreas, gallbladder,  adrenal glands, or kidneys. No upper abdominal free air, ascites, or  inflammatory focus is seen. Upper and midabdominal small bowel loops  appear normal.     Delayed venous phase images show good contrast opacification of normal  caliber renal collecting systems, ureters and bladder with no evidence  of obstructive uropathy. Bony structures appear grossly intact.       Impression:      1. Very small residual pre-rectal fluid collection, markedly improved  from 07/03/2021.  2. Interval enlargement of multiple other intrapelvic fluid collections  as discussed above. No clearly new fluid collection or other new  inflammatory focus is appreciated.  3. Mild new right basilar atelectasis, minimal left basilar atelectasis  and minimal pleural effusions.     D:  07/08/2021  E:  07/08/2021     This report was finalized on 7/8/2021 9:48 PM by Dr. Froy Xavier MD.                 I have reviewed the medications:  Scheduled Meds:DAPTOmycin, 6 mg/kg (Adjusted), Intravenous, Q24H  docusate sodium, 100 mg, Oral, BID  enoxaparin, 40 mg, Subcutaneous,  Daily  piperacillin-tazobactam, 4.5 g, Intravenous, Q8H  polyethylene glycol, 17 g, Oral, Daily  sodium chloride, 10 mL, Intravenous, Q12H      Continuous Infusions:sodium chloride, 100 mL/hr, Last Rate: 100 mL/hr (07/09/21 0417)      PRN Meds:.•  acetaminophen **OR** acetaminophen **OR** acetaminophen  •  bisacodyl  •  HYDROcodone-acetaminophen  •  HYDROmorphone  •  magnesium sulfate **OR** magnesium sulfate **OR** magnesium sulfate  •  ondansetron **OR** ondansetron  •  phenol  •  potassium chloride **OR** potassium chloride **OR** potassium chloride  •  Sodium Chloride (PF)  •  sodium chloride    Assessment/Plan   Assessment & Plan     Active Hospital Problems    Diagnosis  POA   • Intra-abdominal abscess post-procedure [T81.43XA]  Yes   • Lower abdominal pain [R10.30]  Unknown   • Status post hysterectomy [Z90.710]  Unknown      Resolved Hospital Problems   No resolved problems to display.        Brief Hospital Course to date:  Anisa Sosa is a 31 y.o. female with history of elective laparoscopic hysterectomy in [place] on May 27.  She recently reports increased abdominal pain and anorexia as well as no fevers.  CT abdomen pelvis here showed extensive peritoneal inflammation throughout the pelvis with multiple small peripherally enhancing interloop fluid collections scattered in the low pelvis with the largest measuring 5.7 cm in the perirectal area.    Fever  Leukocytosis  Intra-abdominal abscesses  - afebrile for last 24 hours  - Leukocytosis had resolved, but now back up again,  CRP increased to 34 two days ago  -Continue daptomycin/zosyn; consider antifugals if clinical worsening  -no discrete walled abscess amenable for drainage on 7/5. D/w Dr. Frias and Dr. Robertson, repeated CT scan 7/8 to see if there any collection of fluid amenable to drainage. Not enough fluid for drainage by IR.  D/w Dr. Robertson who is planning to take pt to OR today for washout with Gyn/Onc assistance.   -Pain control with  current regimen    DVT prophylaxis: lovenox    Disposition: I expect the patient to be discharged home tbd    CODE STATUS:   Code Status and Medical Interventions:   Ordered at: 07/04/21 0418     Level Of Support Discussed With:    Patient     Code Status:    CPR     Medical Interventions (Level of Support Prior to Arrest):    Full       Maribel Varela MD  07/09/21

## 2021-07-09 NOTE — PROGRESS NOTES
Gynecologic Oncology Inpatient Consult Progress Note      Patient Name: Anisa Sosa  : 1990   MRN: [id number]       Subjective   No acute events overnight. Patient reports she feels improved overall but that her pain was a bit worse last night. She states that she feels warm but not like she has fever or chills. She has been NPO since midnight and does not have any questions or concerns re: dx lap with washout and possible oophorectomy today.     Objective     Physical Exam:  Vital Signs:   Vitals:    21 1448 21 1911 21 0353 21 0644   BP: 127/91 116/80 131/86    BP Location: Left arm Left arm Left arm    Patient Position: Sitting Lying Lying    Pulse: 80 74 66    Resp: 18 16 15    Temp: 98.4 °F (36.9 °C) 98 °F (36.7 °C) 98.3 °F (36.8 °C)    TempSrc: Oral Oral Oral    SpO2: 90% 97% 96%    Weight:   90.7 kg (200 lb) 90.7 kg (200 lb)   Height:         BMI: Body mass index is 28.7 kg/m².   Weight:   Wt Readings from Last 3 Encounters:   21 90.7 kg (200 lb)     I&O: I/O last 3 completed shifts:  In: 2305 [P.O.:1005; I.V.:1000; IV Piggyback:300]  Out: -       Vitals and nursing note reviewed.   Constitutional:       General: She is not in acute distress.     Appearance: Normal appearance. She is well-developed. She is not diaphoretic.   HENT:      Head: Normocephalic and atraumatic.      Right Ear: External ear normal.      Left Ear: External ear normal.      Nose: Nose normal.   Eyes:      General: No scleral icterus or discharge.      Cardiovascular:      Rate and Rhythm: Normal rate and regular rhythm.      Heart sounds: Normal heart sounds. No murmur heard.   Pulmonary:      Effort: Pulmonary effort is normal. No respiratory distress.      Breath sounds: Normal breath sounds. No wheezing or rales.   Abdominal:      Palpations: Abdomen is soft. No guarding or rebound.Softly distended. Mildly TTP. Normoactive bowel sounds.    Musculoskeletal:         General: No swelling,  tenderness, deformity or signs of injury.      Right lower leg: No edema.      Left lower leg: No edema.   Skin:     General: Skin is warm and dry.      Coloration: Skin is not jaundiced.      Findings: No lesion or rash.   Neurological:      General: No focal deficit present.      Mental Status: She is alert and oriented to person, place, and time.   Psychiatric:         Thought Content: Thought content normal.         Judgment: Judgment normal.     Inpatient Medications:     Current Facility-Administered Medications:   •  acetaminophen (TYLENOL) tablet 650 mg, 650 mg, Oral, Q4H PRN, 650 mg at 07/06/21 0443 **OR** acetaminophen (TYLENOL) 160 MG/5ML solution 650 mg, 650 mg, Oral, Q4H PRN **OR** acetaminophen (TYLENOL) suppository 650 mg, 650 mg, Rectal, Q4H PRN, Tierney Martinez MD  •  bisacodyl (DULCOLAX) suppository 10 mg, 10 mg, Rectal, Daily PRN, Marbiel Varela MD, 10 mg at 07/07/21 1506  •  DAPTOmycin (CUBICIN) 450 mg in sodium chloride 0.9 % 50 mL IVPB, 6 mg/kg (Adjusted), Intravenous, Q24H, Girish Martin PA, Last Rate: 100 mL/hr at 07/08/21 1719, 450 mg at 07/08/21 1719  •  docusate sodium (COLACE) capsule 100 mg, 100 mg, Oral, BID, Walter Prieto MD, 100 mg at 07/08/21 2031  •  enoxaparin (LOVENOX) syringe 40 mg, 40 mg, Subcutaneous, Daily, Walter Prieto MD, Stopped at 07/08/21 1422  •  HYDROcodone-acetaminophen (NORCO) 7.5-325 MG per tablet 1 tablet, 1 tablet, Oral, Q4H PRN, Maribel Varela MD, 1 tablet at 07/09/21 0417  •  HYDROmorphone (DILAUDID) injection 0.5 mg, 0.5 mg, Intravenous, Q2H PRN, Tierney Martinez MD, 0.5 mg at 07/09/21 0055  •  Magnesium Sulfate 2 gram Bolus, followed by 8 gram infusion (total Mg dose 10 grams)- Mg less than or equal to 1mg/dL, 2 g, Intravenous, PRN **OR** Magnesium Sulfate 2 gram / 50mL Infusion (GIVE X 3 BAGS TO EQUAL 6GM TOTAL DOSE) - Mg 1.1 - 1.5 mg/dl, 2 g, Intravenous, PRN **OR** Magnesium Sulfate 4 gram infusion- Mg 1.6-1.9 mg/dL, 4 g,  Intravenous, PRN, Walter Prieto MD, Last Rate: 25 mL/hr at 07/06/21 2127, 4 g at 07/06/21 2127  •  ondansetron (ZOFRAN) tablet 4 mg, 4 mg, Oral, Q6H PRN, 4 mg at 07/08/21 1257 **OR** ondansetron (ZOFRAN) injection 4 mg, 4 mg, Intravenous, Q6H PRN, Tierney Martinez MD, 4 mg at 07/07/21 0828  •  phenol (CHLORASEPTIC) 1.4 % liquid 2 spray, 2 spray, Mouth/Throat, Q2H PRN, Corin Brannon, APRN  •  piperacillin-tazobactam (ZOSYN) 4.5 g in iso-osmotic dextrose 100 mL IVPB (premix), 4.5 g, Intravenous, Q8H, Titus Frias MD, 4.5 g at 07/09/21 0512  •  polyethylene glycol (MIRALAX) packet 17 g, 17 g, Oral, Daily, Walter Prieto MD, 17 g at 07/07/21 0812  •  potassium chloride (MICRO-K) CR capsule 40 mEq, 40 mEq, Oral, PRN, 40 mEq at 07/07/21 0734 **OR** potassium chloride (KLOR-CON) packet 40 mEq, 40 mEq, Oral, PRN **OR** potassium chloride 10 mEq in 100 mL IVPB, 10 mEq, Intravenous, Q1H PRN, Walter Prieto MD  •  Sodium Chloride (PF) 0.9 % 10 mL, 10 mL, Intravenous, PRN, Tierney Martinez MD  •  sodium chloride 0.9 % flush 1-10 mL, 1-10 mL, Intravenous, PRN, Tierney Martinez MD, 10 mL at 07/04/21 1638  •  sodium chloride 0.9 % flush 10 mL, 10 mL, Intravenous, Q12H, Tierney Martinez MD, 10 mL at 07/08/21 2032  •  sodium chloride 0.9 % infusion, 100 mL/hr, Intravenous, Continuous, Walter Prieto MD, Last Rate: 100 mL/hr at 07/09/21 0417, 100 mL/hr at 07/09/21 0417     Results:   Lab Results   Component Value Date    WBC 12.69 (H) 07/09/2021    HGB 9.7 (L) 07/09/2021    HCT 29.9 (L) 07/09/2021    MCV 95.2 07/09/2021     07/09/2021       Lab Results   Component Value Date    GLUCOSE 84 07/08/2021    CALCIUM 9.1 07/08/2021     07/08/2021    K 3.8 07/08/2021    CO2 27.0 07/08/2021     07/08/2021    BUN 4 (L) 07/08/2021    CREATININE 0.62 07/08/2021    EGFRIFAFRI 135 07/08/2021    BCR 6.5 (L) 07/08/2021    ANIONGAP 10.0 07/08/2021        Assessment / Plan    This is a 31 y.o. female POD#43 s/p  LAVH/BS for AUB-L, pelvic pain found to have postoperative intra-abdominal abscesses     1.  Postoperative intra-abdominal abscesses:              -Patient initially clinically improved with IV antibiotics.  However she now continues to have abdominal pain with an increasing white count today on Zosyn.              -IR unable to drain perirectal abscess.              -CT scan 7/8 demonstrates progression of her pelvic and interloop fluid collections.              -On review of imaging, I do not see any abscess that is accessible per vagina. Additionally, there are multiple fluid collections higher in the pelvis and abdomen.  Given the patient's continued pain and worsening leukocytosis I feel the best course of action will be to take the patient for a laparoscopic washout.  We discussed that we would ideally like to maintain the patient's ovaries.  I do not recommend removal of these unless significantly involved with infection.  We did also review the small chance of conversion to laparotomy in the event that significant adhesive disease or infection was identified.  However I am hopeful that we will be able to simply perform a washout which should help improve her clinical status.  Patient verbalizes understanding.    -Continue NPO with IVF and to OR this afternoon    Quiana Schrader MD  07/09/21  07:18 EDT

## 2021-07-09 NOTE — BRIEF OP NOTE
DIAGNOSTIC LAPAROSCOPY  Progress Note    Anisa KAITY Sosa  7/9/2021    Pre-op Diagnosis:   Intra-abdominal abscess post-procedure [T81.43XA]  Lower abdominal pain [R10.30]  Status post hysterectomy [Z90.710]       Post-Op Diagnosis Codes:     * Intra-abdominal abscess post-procedure [T81.43XA]     * Lower abdominal pain [R10.30]     * Status post hysterectomy [Z90.710]    Procedure/CPT® Codes:        Procedure(s):  DIAGNOSTIC LAPAROSCOPY WITH WASHOUT    Surgeon(s):  Amee Robertson MD Vetter, Monica H, MD    Anesthesia: General    Staff:   Circulator: Tamra Sung RN  Scrub Person: Shannon You         Estimated Blood Loss: none    Urine Voided: 475 mL    Specimens:                Specimens     ID Source Type Tests Collected By Collected At Frozen?    1 Vagina Vaginal Fluid · ANAEROBIC CULTURE  · GRAM STAIN  · GENITAL CULTURE   Amee Robertson MD 7/9/21 1523     Description: vaginal fluid                Drains: * No LDAs found *    Findings: Colon and small bowel adhesions to anterior abdominal wall with inability to visualize the lower abdomen and pelvis.  Vaginal cuff felt intact but indurated with small amount of spontaneous drainage of fluid that was sent for culture as stated above.     Complications: None          Amee Robertson MD     Date: 7/9/2021  Time: 15:54 EDT

## 2021-07-09 NOTE — PLAN OF CARE
Goal Outcome Evaluation:  Plan of Care Reviewed With: patient        Progress: no change  Outcome Summary: Vitals stable through the nigh. C/O pain throughout the night. Two doses prn dilaudid and two doses norco given.

## 2021-07-10 LAB
ANION GAP SERPL CALCULATED.3IONS-SCNC: 7 MMOL/L (ref 5–15)
BUN SERPL-MCNC: 6 MG/DL (ref 6–20)
BUN/CREAT SERPL: 9.5 (ref 7–25)
CALCIUM SPEC-SCNC: 9.3 MG/DL (ref 8.6–10.5)
CHLORIDE SERPL-SCNC: 107 MMOL/L (ref 98–107)
CO2 SERPL-SCNC: 25 MMOL/L (ref 22–29)
CREAT SERPL-MCNC: 0.63 MG/DL (ref 0.57–1)
DEPRECATED RDW RBC AUTO: 44 FL (ref 37–54)
ERYTHROCYTE [DISTWIDTH] IN BLOOD BY AUTOMATED COUNT: 12.6 % (ref 12.3–15.4)
GFR SERPL CREATININE-BSD FRML MDRD: 133 ML/MIN/1.73
GLUCOSE SERPL-MCNC: 122 MG/DL (ref 65–99)
HCT VFR BLD AUTO: 32.9 % (ref 34–46.6)
HGB BLD-MCNC: 10.6 G/DL (ref 12–15.9)
MCH RBC QN AUTO: 30.6 PG (ref 26.6–33)
MCHC RBC AUTO-ENTMCNC: 32.2 G/DL (ref 31.5–35.7)
MCV RBC AUTO: 95.1 FL (ref 79–97)
PLATELET # BLD AUTO: 341 10*3/MM3 (ref 140–450)
PMV BLD AUTO: 10.5 FL (ref 6–12)
POTASSIUM SERPL-SCNC: 4.3 MMOL/L (ref 3.5–5.2)
RBC # BLD AUTO: 3.46 10*6/MM3 (ref 3.77–5.28)
SODIUM SERPL-SCNC: 139 MMOL/L (ref 136–145)
WBC # BLD AUTO: 15.93 10*3/MM3 (ref 3.4–10.8)

## 2021-07-10 PROCEDURE — 25010000002 DAPTOMYCIN PER 1 MG: Performed by: PHYSICIAN ASSISTANT

## 2021-07-10 PROCEDURE — 25010000002 ENOXAPARIN PER 10 MG: Performed by: INTERNAL MEDICINE

## 2021-07-10 PROCEDURE — 85027 COMPLETE CBC AUTOMATED: CPT | Performed by: INTERNAL MEDICINE

## 2021-07-10 PROCEDURE — 80048 BASIC METABOLIC PNL TOTAL CA: CPT | Performed by: INTERNAL MEDICINE

## 2021-07-10 PROCEDURE — 99024 POSTOP FOLLOW-UP VISIT: CPT | Performed by: OBSTETRICS & GYNECOLOGY

## 2021-07-10 PROCEDURE — 25010000002 PIPERACILLIN SOD-TAZOBACTAM PER 1 G: Performed by: INTERNAL MEDICINE

## 2021-07-10 PROCEDURE — 25010000002 HYDROMORPHONE PER 4 MG: Performed by: PHYSICIAN ASSISTANT

## 2021-07-10 PROCEDURE — 99232 SBSQ HOSP IP/OBS MODERATE 35: CPT | Performed by: INTERNAL MEDICINE

## 2021-07-10 RX ORDER — HYDROMORPHONE HYDROCHLORIDE 1 MG/ML
0.5 INJECTION, SOLUTION INTRAMUSCULAR; INTRAVENOUS; SUBCUTANEOUS
Status: COMPLETED | OUTPATIENT
Start: 2021-07-10 | End: 2021-07-11

## 2021-07-10 RX ADMIN — HYDROCODONE BITARTRATE AND ACETAMINOPHEN 1 TABLET: 7.5; 325 TABLET ORAL at 10:03

## 2021-07-10 RX ADMIN — POTASSIUM CHLORIDE 40 MEQ: 750 CAPSULE, EXTENDED RELEASE ORAL at 01:43

## 2021-07-10 RX ADMIN — HYDROMORPHONE HYDROCHLORIDE 0.5 MG: 1 INJECTION, SOLUTION INTRAMUSCULAR; INTRAVENOUS; SUBCUTANEOUS at 02:11

## 2021-07-10 RX ADMIN — HYDROCODONE BITARTRATE AND ACETAMINOPHEN 1 TABLET: 7.5; 325 TABLET ORAL at 20:14

## 2021-07-10 RX ADMIN — SODIUM CHLORIDE 100 ML/HR: 9 INJECTION, SOLUTION INTRAVENOUS at 05:46

## 2021-07-10 RX ADMIN — TAZOBACTAM SODIUM AND PIPERACILLIN SODIUM 4.5 G: 500; 4 INJECTION, SOLUTION INTRAVENOUS at 04:03

## 2021-07-10 RX ADMIN — DOCUSATE SODIUM 100 MG: 100 CAPSULE, LIQUID FILLED ORAL at 10:03

## 2021-07-10 RX ADMIN — HYDROMORPHONE HYDROCHLORIDE 0.5 MG: 1 INJECTION, SOLUTION INTRAMUSCULAR; INTRAVENOUS; SUBCUTANEOUS at 07:31

## 2021-07-10 RX ADMIN — ENOXAPARIN SODIUM 40 MG: 40 INJECTION SUBCUTANEOUS at 10:03

## 2021-07-10 RX ADMIN — ACETAMINOPHEN 650 MG: 325 TABLET ORAL at 14:09

## 2021-07-10 RX ADMIN — DOCUSATE SODIUM 100 MG: 100 CAPSULE, LIQUID FILLED ORAL at 20:10

## 2021-07-10 RX ADMIN — TAZOBACTAM SODIUM AND PIPERACILLIN SODIUM 4.5 G: 500; 4 INJECTION, SOLUTION INTRAVENOUS at 21:14

## 2021-07-10 RX ADMIN — HYDROCODONE BITARTRATE AND ACETAMINOPHEN 1 TABLET: 7.5; 325 TABLET ORAL at 01:43

## 2021-07-10 RX ADMIN — HYDROCODONE BITARTRATE AND ACETAMINOPHEN 1 TABLET: 7.5; 325 TABLET ORAL at 05:50

## 2021-07-10 RX ADMIN — TAZOBACTAM SODIUM AND PIPERACILLIN SODIUM 4.5 G: 500; 4 INJECTION, SOLUTION INTRAVENOUS at 14:04

## 2021-07-10 RX ADMIN — POLYETHYLENE GLYCOL 3350 17 G: 17 POWDER, FOR SOLUTION ORAL at 10:03

## 2021-07-10 RX ADMIN — DAPTOMYCIN 450 MG: 500 INJECTION, POWDER, LYOPHILIZED, FOR SOLUTION INTRAVENOUS at 19:27

## 2021-07-10 NOTE — PLAN OF CARE
Goal Outcome Evaluation:   Patient did well today. Ambulated in truong several times. Passed flatus after ambulating. VSS on RA. Pain addressed with prn norco and tylenol.

## 2021-07-10 NOTE — PROGRESS NOTES
INFECTIOUS DISEASE CONSULT/INITIAL HOSPITAL VISIT    Anisa Sosa  1990  [id number]    Date of Consult: 7/5/21  Admission Date: 7/3/2021      Requesting Provider: Walter Prieto MD  Evaluating Physician: Titus Frias MD    Reason for Consultation: intraabdominal abscesses after hysterectomy    History of present illness:    Patient is a 31 y.o. female with h/o STIs and abnormal uterine bleeding/uterine fibroids s/p lap vaginal-assisted hysterectomy in [place] on 5/27/21 (with ovaries retained) who followed up a week later and was placed on 5 days of Cipro for possible UTI. She did relatively well post-operatively with mild tenderness.  Last Sunday, she went to a tournament in [place] to watch her daughter and started feeling fatigued.  She slept for a couple of days, but on Tuesday, she began having severe abdominal pain in lower part of abdomen.  She has some dizziness and lightheadedness with the pain.  She went to [facility] ED on 6/30 with a CT scan at that time showing some inflammation of the pelvic area.  She was sent home on ibuprofen, pain meds, and Cipro.  She continued to have pain and pelvic pressure with no relief from medications or heating pad.  She had some difficulty breathing because of the bloating and pressure.  She had not had a BM since 6/29. She started having nausea and vomiting on 7/1.  On 7/2, she had worsening pain and came to [facility] ED on 7/3.  A CT scan here showed interloop abscesses 1 to 2 cm in sizes and a 5.7 cm abscess near the rectum.  A CT-guided aspiration of prerectal abscess was attempted today but aborted d/t the fluid collection being mobile.  Her Tmax since arrival has been 102.5.  Pertinent labs are PCT 0.3, lactic acid 0.8, WBC 14,400 with 90% neutrophils, creatinine 0.72, CRP 31.7, and UA WBC 3-5. Blood cultures are negative to date. A COVID-19 PCR was negative. She is currently on Vancomycin and Zosyn.  ID was asked to evaluate and  "manage her antibiotic therapy.       Subjective:    21: The patient did have some chills last night but fevers seem to be better today.  Tmax overnight was 100.9°F.  She did have some hypokalemia to a potassium of 2.6.  Pain is well controlled but still occasionally having some left lower quadrant and central abdominal pain. Only some mild nausea that is controlled with Zofran.  No bowel movement yet.       21: Still having some lower abdominal pain but no worse.  Having nausea that is well controlled with Zofran.  Fevers have improved today with Tmax 99.7°F.  She is tolerating the antibiotics well without any adverse side effects.    21: the patient is doing ok today. Abdominal pain is no worse. Tolerating diet. No n/v or diarrhea. No fevers. CT A/P with some improvement in perirectal fluid collection but some enlargement of other fluid collections. Plan is for surgery tomorrow.     7/10/21: the patient was taken to OR yesterday and intraoperative findings of \"colon and small bowel adhesions to anterior abdominal wall with inability to visualize the lower abdomen and pelvis. Vaginal cuff was intact but indurated with a small amount of spontaneous drainage that was sent for culture.\" No fevers. Abdominal pain is better today per the patient. No n/v.     Past Medical History:   Diagnosis Date   • Anemia    • History of chlamydia    • History of trichomonal vaginitis        Past Surgical History:   Procedure Laterality Date   •  SECTION     •  SECTION     •  SECTION WITH TUBAL  2013   • LAPAROSCOPIC ASSISTED VAGINAL HYSTERECTOMY  2021    for AUB-L, pelvic pain. Ovaries retained per patient. Dr. Avani Soto [place].        Family History   Problem Relation Age of Onset   • Breast cancer Maternal Grandmother    • Ovarian cancer Neg Hx    • Colon cancer Neg Hx        Social History     Socioeconomic History   • Marital status: Single     Spouse name: Not on file   • " Number of children: Not on file   • Years of education: Not on file   • Highest education level: Not on file   Tobacco Use   • Smoking status: Former Smoker   • Smokeless tobacco: Never Used   • Tobacco comment: off and on for 3 years    Vaping Use   • Vaping Use: Never used   Substance and Sexual Activity   • Alcohol use: Yes     Comment: occ   • Drug use: Never   • Sexual activity: Defer       No Known Allergies      Medication:    Current Facility-Administered Medications:   •  acetaminophen (TYLENOL) tablet 650 mg, 650 mg, Oral, Q6H PRN, Maribel Varela MD  •  [MAR Hold] bisacodyl (DULCOLAX) suppository 10 mg, 10 mg, Rectal, Daily PRN, Maribel Varela MD, 10 mg at 07/07/21 1506  •  DAPTOmycin (CUBICIN) 450 mg in sodium chloride 0.9 % 50 mL IVPB, 6 mg/kg (Adjusted), Intravenous, Q24H, Girish Martin PA, Last Rate: 100 mL/hr at 07/09/21 1759, 450 mg at 07/09/21 1759  •  docusate sodium (COLACE) capsule 100 mg, 100 mg, Oral, BID, Maribel Varela MD, 100 mg at 07/10/21 1003  •  enoxaparin (LOVENOX) syringe 40 mg, 40 mg, Subcutaneous, Q24H, Maribel Varela MD, 40 mg at 07/10/21 1003  •  HYDROcodone-acetaminophen (NORCO) 7.5-325 MG per tablet 1 tablet, 1 tablet, Oral, Q4H PRN, Maribel Varela MD, 1 tablet at 07/10/21 1003  •  [MAR Hold] HYDROmorphone (DILAUDID) injection 0.5 mg, 0.5 mg, Intravenous, Q2H PRN, Tierney Martinez MD, 0.5 mg at 07/09/21 1128  •  HYDROmorphone (DILAUDID) injection 0.5 mg, 0.5 mg, Intravenous, Q3H PRN, Judie Andrade PA-C, 0.5 mg at 07/10/21 0731  •  lactated ringers infusion, 9 mL/hr, Intravenous, Continuous, Hebert Roca MD, Last Rate: 9 mL/hr at 07/09/21 1225, New Bag at 07/09/21 1547  •  Magnesium Sulfate 2 gram Bolus, followed by 8 gram infusion (total Mg dose 10 grams)- Mg less than or equal to 1mg/dL, 2 g, Intravenous, PRN **OR** Magnesium Sulfate 2 gram / 50mL Infusion (GIVE X 3 BAGS TO EQUAL 6GM TOTAL DOSE) - Mg 1.1 - 1.5 mg/dl, 2 g,  Intravenous, PRN **OR** Magnesium Sulfate 4 gram infusion- Mg 1.6-1.9 mg/dL, 4 g, Intravenous, PRN, Maribel Varela MD  •  ondansetron (ZOFRAN) injection 4 mg, 4 mg, Intravenous, Q6H PRN, Maribel Varela MD  •  ondansetron (ZOFRAN) tablet 4 mg, 4 mg, Oral, Q6H PRN, Maribel Varela MD  •  [MAR Hold] phenol (CHLORASEPTIC) 1.4 % liquid 2 spray, 2 spray, Mouth/Throat, Q2H PRN, Corin Brannon, APRN  •  piperacillin-tazobactam (ZOSYN) 4.5 g in iso-osmotic dextrose 100 mL IVPB (premix), 4.5 g, Intravenous, Q8H, Titus Frias MD, 4.5 g at 07/10/21 0403  •  polyethylene glycol (MIRALAX) packet 17 g, 17 g, Oral, Daily, Maribel Varela MD, 17 g at 07/10/21 1003  •  potassium chloride (MICRO-K) CR capsule 40 mEq, 40 mEq, Oral, PRN, 40 mEq at 07/10/21 0143 **OR** potassium chloride (KLOR-CON) packet 40 mEq, 40 mEq, Oral, PRN **OR** potassium chloride 10 mEq in 100 mL IVPB, 10 mEq, Intravenous, Q1H PRN, Waletr Prieto MD  •  Sodium Chloride (PF) 0.9 % 10 mL, 10 mL, Intravenous, Q12H, Maribel Varela MD  •  sodium chloride 0.9 % infusion, 100 mL/hr, Intravenous, Continuous, Walter Prieto MD, Last Rate: 100 mL/hr at 07/10/21 0546, 100 mL/hr at 07/10/21 0546    Antibiotics:  Anti-Infectives (From admission, onward)    Ordered     Dose/Rate Route Frequency Start Stop    07/05/21 1631  DAPTOmycin (CUBICIN) 450 mg in sodium chloride 0.9 % 50 mL IVPB     Neri Prater, [facility] reviewed the order on 07/06/21 0722.   Ordering Provider: Girish Martin PA    6 mg/kg × 75.6 kg (Adjusted)  100 mL/hr over 30 Minutes Intravenous Every 24 Hours 07/05/21 1800 07/19/21 1759    07/04/21 0423  piperacillin-tazobactam (ZOSYN) 4.5 g in iso-osmotic dextrose 100 mL IVPB (premix)     Titus Frias MD reviewed the order on 07/07/21 0851.   Ordering Provider: Titus Frias MD    4.5 g  over 4 Hours Intravenous Every 8 Hours 07/04/21 1200 07/18/21 1159    07/04/21 0423  piperacillin-tazobactam  (ZOSYN) 4.5 g in iso-osmotic dextrose 100 mL IVPB (premix)     Ordering Provider: Tierney Martinez MD    4.5 g  over 30 Minutes Intravenous Once 21 0600 21 0528    21 2355  vancomycin 1750 mg/500 mL 0.9% NS IVPB ([facility])     Ordering Provider: Lito Machuca DO    20 mg/kg × 83.9 kg  over 120 Minutes Intravenous Once 21 2357 21 0250    21 2355  piperacillin-tazobactam (ZOSYN) 3.375 g in iso-osmotic dextrose 50 ml (premix)     Ordering Provider: Lito Machuca DO    3.375 g  over 30 Minutes Intravenous Once 21 23521 0140            Review of Systems:  As above    Physical Exam:   Vital Signs  Temp (24hrs), Av.7 °F (36.5 °C), Min:96.8 °F (36 °C), Max:98.6 °F (37 °C)    Temp  Min: 96.8 °F (36 °C)  Max: 98.6 °F (37 °C)  BP  Min: 112/75  Max: 140/95  Pulse  Min: 48  Max: 85  Resp  Min: 16  Max: 28  SpO2  Min: 92 %  Max: 97 %    GENERAL: Awake and alert, in no acute distress. Sitting up in bed  HEENT: Normocephalic, atraumatic.  No labial ulcers noted  HEART: RRR; No murmur, rubs, gallops.   LUNGS: CTAB. nonlabored breathing on room air  ABDOMEN: Soft, +diffuse tenderness to palpation, Nondistended. No HSM  EXT:  No cyanosis, clubbing or edema. No cord.  :  Without Menon catheter.   MSK:  FROM, no joint effusions  SKIN: no new rashes noted  NEURO: Oriented to PPT. Normal speech and cognition.  PSYCHIATRIC: Normal insight and judgment. Cooperative with PE    Laboratory Data    Results from last 7 days   Lab Units 07/10/21  0842 21  0552 21  0704   WBC 10*3/mm3 15.93* 12.69* 15.28*   HEMOGLOBIN g/dL 10.6* 9.7* 10.1*   HEMATOCRIT % 32.9* 29.9* 31.4*   PLATELETS 10*3/mm3 341 256 233     Results from last 7 days   Lab Units 07/10/21  0842   SODIUM mmol/L 139   POTASSIUM mmol/L 4.3   CHLORIDE mmol/L 107   CO2 mmol/L 25.0   BUN mg/dL 6   CREATININE mg/dL 0.63   GLUCOSE mg/dL 122*   CALCIUM mg/dL 9.3     Results from last 7 days   Lab Units  07/07/21  0612   ALK PHOS U/L 69   BILIRUBIN mg/dL 0.3   ALT (SGPT) U/L 6   AST (SGOT) U/L 11         Results from last 7 days   Lab Units 07/06/21  0713   CRP mg/dL 34.97*     Results from last 7 days   Lab Units 07/03/21  2230   LACTATE mmol/L 0.8     Results from last 7 days   Lab Units 07/05/21  0756   CK TOTAL U/L 32     Results from last 7 days   Lab Units 07/05/21  1230   VANCOMYCIN TR mcg/mL <4.00*     Estimated Creatinine Clearance: 161.5 mL/min (by C-G formula based on SCr of 0.63 mg/dL).      Microbiology:  Microbiology Results (last 10 days)     Procedure Component Value - Date/Time    Genital Culture - Vaginal Fluid, Vagina [[id number]] Collected: 07/09/21 1523    Lab Status: Preliminary result Specimen: Vaginal Fluid Updated: 07/10/21 0817     Genital Culture Culture in progress     Gram Stain Rare (1+) WBCs seen      Rare (1+) Gram negative bacilli    MRSA Screen, PCR (Inpatient) - Swab, Nares [[id number]]  (Normal) Collected: 07/05/21 1828    Lab Status: Final result Specimen: Swab from Nares Updated: 07/05/21 2036     MRSA PCR Negative    Narrative:      MRSA Negative    COVID PRE-OP / PRE-PROCEDURE SCREENING ORDER (NO ISOLATION) - Swab, Nasopharynx [[id number]]  (Normal) Collected: 07/04/21 0302    Lab Status: Final result Specimen: Swab from Nasopharynx Updated: 07/04/21 0408    Narrative:      The following orders were created for panel order COVID PRE-OP / PRE-PROCEDURE SCREENING ORDER (NO ISOLATION) - Swab, Nasopharynx.  Procedure                               Abnormality         Status                     ---------                               -----------         ------                     COVID-19,CEPHEID,JORGE IN-...[[id number]]  Normal              Final result                 Please view results for these tests on the individual orders.    COVID-19,CEPHEID,JORGE IN-HOUSE(OR EMERGENT/ADD-ON),NP SWAB IN TRANSPORT MEDIA 3-4 HR TAT - Swab, Nasopharynx [[id number]]  (Normal) Collected: 07/04/21 0302     Lab Status: Final result Specimen: Swab from Nasopharynx Updated: 07/04/21 0408     COVID19 Not Detected    Narrative:      Fact sheet for providers: https://www.fda.gov/media/040429/download     Fact sheet for patients: https://www.fda.gov/media/331388/download  Fact sheet for providers: https://www.fda.gov/media/588141/download     Fact sheet for patients: https://www.fda.gov/media/326905/download    Blood Culture - Blood, Arm, Left [[id number]] Collected: 07/04/21 0017    Lab Status: Final result Specimen: Blood from Arm, Left Updated: 07/09/21 0445     Blood Culture No growth at 5 days    Blood Culture - Blood, Arm, Right [[id number]] Collected: 07/04/21 0017    Lab Status: Final result Specimen: Blood from Arm, Right Updated: 07/09/21 0445     Blood Culture No growth at 5 days              Radiology:  Imaging Results (Last 72 Hours)     Procedure Component Value Units Date/Time    CT Abdomen Pelvis With Contrast [[id number]] Collected: 07/08/21 1159     Updated: 07/08/21 2151    Narrative:      EXAMINATION: CT ABDOMEN AND PELVIS W CONTRAST-      INDICATION: Abdominal abscess/infection suspected; T81.43XA-Infection  following a procedure, organ and space surgical site, initial encounter;  R10.30-Lower abdominal pain, unspecified; Z90.710-Acquired absence of  both cervix and uterus.     TECHNIQUE: 5 mm post IV contrast portal venous phase and delayed venous  phase images through the abdomen and pelvis.     The radiation dose reduction device was turned on for each scan per the  ALARA (As Low as Reasonably Achievable) protocol.     COMPARISON: Abdomen and pelvis CT scan 07/03/2021.     FINDINGS: Patient history indicates prior hysterectomy, the 07/03/2021  exam report indicated extensive peritoneal inflammation, multiple small  peripherally enhancing interloop fluid collections concerning for  abscess, largest 1 to 2 cm, with a pre-rectal collection just under 6  cm, subsequent aspirated under CT guidance.      Today's study shows this collection to appear very small,  nonair-containing with no obvious enhancement, 3.6 x 1.5 cm in maximal  dimensions. Left pelvic sidewall collection appears increased, from 3.1  x 2.6 m on 07/03/2021 to approximately 4.9 x 2.7 cm today. At the same  axial level, a small central pelvic fluid and air collection, 16 mm in  diameter has increased to 3.8 cm in diameter. There is a suggestion of  several other fluid collections between bowel loops at approximately the  same level in the central pelvis, but these are difficult to reliably  distinguish from fluid-containing small bowel as is seen elsewhere. Of  these, a central low-density area in the pelvis, image 68 series 2 is  thought to represent a 26 mm collection previously 11 mm. Dorsally  adjacent collection approximately 2.7 cm appears to be increased from  1.5 cm. Neither collection contains air. Inflammatory fat stranding of  the pelvis is similar in extent to prior exam. No free fluid is seen.  Area of the vaginal cuff remains somewhat thickened but stable. No  hematoma is seen. Bladder is normally distended and normal in  appearance.     Elsewhere, there is mild new right lower lobe discoid atelectasis and  trace effusion and minimal left lower lobe discoid atelectasis. There is  extensive fatty liver change. No hepatic lesions are identified. No  significant abnormalities are seen of the spleen, pancreas, gallbladder,  adrenal glands, or kidneys. No upper abdominal free air, ascites, or  inflammatory focus is seen. Upper and midabdominal small bowel loops  appear normal.     Delayed venous phase images show good contrast opacification of normal  caliber renal collecting systems, ureters and bladder with no evidence  of obstructive uropathy. Bony structures appear grossly intact.       Impression:      1. Very small residual pre-rectal fluid collection, markedly improved  from 07/03/2021.  2. Interval enlargement of multiple other  intrapelvic fluid collections  as discussed above. No clearly new fluid collection or other new  inflammatory focus is appreciated.  3. Mild new right basilar atelectasis, minimal left basilar atelectasis  and minimal pleural effusions.     D:  07/08/2021  E:  07/08/2021     This report was finalized on 7/8/2021 9:48 PM by Dr. Froy Xavier MD.               Impression:   - Sepsis with fever, tachycardia, leukocytosis, and post-operative pelvic abscesses- improved  - Multiple pelvic abscess after prior hysterectomy 5/27/21.  S/p surgery 7/9/21  - Leukocytosis/neutrophilia, slightly worse  - Pelvic pain  - Fever, improving  - Abnormal uterine bleeding/fibroid tumors s/p hysterectomy (vaginal-assisted) with ovaries retained  -hypokalemia- ongoing but improved    PLAN/RECOMMENDATIONS:   Thank you for asking us to see Anisa Sosa, I recommend the following:  - Follow blood cultures. -no growth to date  - Continue Zosyn for empiric IA coverage  - Continue Daptomycin 6 mg/kg IV daily to empirically cover MRSA and VRE.   - s/p surgery 7/9- culture in progress with GNR on gram stain      Titus Frias MD  7/10/2021  11:56 EDT

## 2021-07-10 NOTE — PROGRESS NOTES
7/10/2021    Name:Anisa Sosa   MR#:[id number]      GYN Progress Note       HD:6    Subjective   31 y.o.yo  POD#1 from Dx LPS in the context of intraabdominal post operative infection after outside TLH  She feels better.   Tolerating a regular diet  Voiding well  Passing Flatus  She has ambulated very little today    Patient Active Problem List   Diagnosis   • Intra-abdominal abscess post-procedure   • Lower abdominal pain   • Status post hysterectomy       Objective     Vital Signs Range for the last 24 hours  Temp: Temp:  [96.8 °F (36 °C)-98.6 °F (37 °C)] 96.8 °F (36 °C) Temp src: Oral  BP: BP: (112-140)/(75-95) 117/84   BP Readings from Last 3 Encounters:   07/10/21 117/84     Pulse: Heart Rate:  [48-85] 48   Pulse Readings from Last 3 Encounters:   07/10/21 (!) 48     Resp:  Resp:  [16-28] 18    I/O last 3 completed shifts:  In: 2300 [I.V.:; IV Piggyback:300]  Out: 1875 [Urine:187]  No intake/output data recorded.      Results from last 7 days   Lab Units 07/10/21  0842 21  0552 21  0704 21  0756   WBC 10*3/mm3 15.93* 12.69* 15.28* 10.08   HEMOGLOBIN g/dL 10.6* 9.7* 10.1* 11.0*   HEMATOCRIT % 32.9* 29.9* 31.4* 33.5*   PLATELETS 10*3/mm3 341 256 233 170   MONOCYTES % %  --   --   --  3.0*     Results from last 7 days   Lab Units 07/10/21  0842 21  2335 21  0612   SODIUM mmol/L 139   < > 135*   POTASSIUM mmol/L 4.3  --  3.3*   CHLORIDE mmol/L 107   < > 98   CO2 mmol/L 25.0   < > 29.0   BUN mg/dL 6   < > 3*   CREATININE mg/dL 0.63   < > 0.63   CALCIUM mg/dL 9.3   < > 8.6   BILIRUBIN mg/dL  --   --  0.3   ALK PHOS U/L  --   --  69   ALT (SGPT) U/L  --   --  6   AST (SGOT) U/L  --   --  11   GLUCOSE mg/dL 122*   < > 121*    < > = values in this interval not displayed.     Results from last 7 days   Lab Units 07/10/21  0842   SODIUM mmol/L 139   POTASSIUM mmol/L 4.3   CHLORIDE mmol/L 107   CO2 mmol/L 25.0   BUN mg/dL 6   CREATININE mg/dL 0.63   GLUCOSE mg/dL 122*    CALCIUM mg/dL 9.3         Physical Exam:  General Appearance:  awake, alert, oriented, in no acute distress  Head/face:  NCAT  Lungs:  Normal expansion.  Clear to auscultation.  No rales, rhonchi, or wheezing.  Heart:  Heart sounds are normal.  Regular rate and rhythm without murmur, gallop or rub.  Abdomen:  Skin: clean, dry, intact ecchymosis- at port sites  Auscultation: not performed  Palpation: soft. Non-distended  Extremities: NTTP, no edema, SCDS bilaterally      Assessment/Plan   1.  Post op Day 1 DxLPS  2.  Severe Post operative infection after TLH at outside facility. Improving after DxLPS and IV antibitoics  I would anticipate her continuing on IV antibiotics another 48 hours and then assess for discharge. Will certainly follow ID rec        Angelo Houston MD  7/10/2021 11:54 EDT

## 2021-07-10 NOTE — PROGRESS NOTES
[facility] Medicine Services  PROGRESS NOTE    Patient Name: Anisa Sosa  : 1990  MRN: [id number]    Date of Admission: 7/3/2021  Primary Care Physician: Provider, No Known    Subjective   Subjective     CC:  Abdominal pain    HPI:   Pt states that she is just sore from surgery yesterday, no BM nor flatus yet.     ROS:  Gen- no fevers, chills  CV- No chest pain, palpitations  Resp- No cough, dyspnea  GI- + abd pain        Objective   Objective     Vital Signs:   Temp:  [96.8 °F (36 °C)-98.6 °F (37 °C)] 96.8 °F (36 °C)  Heart Rate:  [48-85] 48  Resp:  [16-28] 18  BP: (112-140)/(75-95) 117/84        Physical Exam:  Constitutional - no acute distress, nontoxic, in bed  HEENT-NCAT, mucous membranes moist  CV-RRR, S1 S2 normal, no m/r/g  Resp-CTAB, no wheezes, rhonchi or rales  Abd-soft, mild lower quadrant tenderness, nondistended, normoactive bowel sounds  Ext-No lower extremity cyanosis, clubbing or edema bilaterally  Neuro-alert and oriented x 3, speech clear, moves all extremities   Psych-normal affect   Skin- No rash on exposed UE or LE bilaterally    Results Reviewed:  Results from last 7 days   Lab Units 07/10/21  0842 21  0552 21  0704 21  0756 21  0518 21  2230   WBC 10*3/mm3 15.93* 12.69* 15.28* 10.08  --  14.40*   HEMOGLOBIN g/dL 10.6* 9.7* 10.1* 11.0*  --  12.2   HEMATOCRIT % 32.9* 29.9* 31.4* 33.5*  --  37.2   PLATELETS 10*3/mm3 341 256 233 170  --  175   INR   --   --   --   --  1.22*  --    PROCALCITONIN ng/mL  --   --   --  0.21  --  0.30*     Results from last 7 days   Lab Units 21  0552 21  0704 21  2335 21  0612 21  0713 21  2230   SODIUM mmol/L 139 143  --  135* 137 139   POTASSIUM mmol/L 3.5 3.8 3.7 3.3* 2.6* 3.5   CHLORIDE mmol/L 103 106  --  98 98 100   CO2 mmol/L 24.0 27.0  --  29.0 31.0* 28.0   BUN mg/dL 4* 4*  --  3* 2* 6   CREATININE mg/dL 0.58 0.62  --  0.63 0.62 0.72   GLUCOSE mg/dL 89  84  --  121* 98 99   CALCIUM mg/dL 8.5* 9.1  --  8.6 8.7 9.6   ALT (SGPT) U/L  --   --   --  6 6 9   AST (SGOT) U/L  --   --   --  11 13 13     Estimated Creatinine Clearance: 175.4 mL/min (by C-G formula based on SCr of 0.58 mg/dL).    Microbiology Results Abnormal     Procedure Component Value - Date/Time    Genital Culture - Vaginal Fluid, Vagina [[id number]] Collected: 07/09/21 1523    Lab Status: Preliminary result Specimen: Vaginal Fluid Updated: 07/10/21 0817     Genital Culture Culture in progress     Gram Stain Rare (1+) WBCs seen      Rare (1+) Gram negative bacilli    Blood Culture - Blood, Arm, Right [[id number]] Collected: 07/04/21 0017    Lab Status: Final result Specimen: Blood from Arm, Right Updated: 07/09/21 0445     Blood Culture No growth at 5 days    Blood Culture - Blood, Arm, Left [[id number]] Collected: 07/04/21 0017    Lab Status: Final result Specimen: Blood from Arm, Left Updated: 07/09/21 0445     Blood Culture No growth at 5 days    MRSA Screen, PCR (Inpatient) - Swab, Nares [[id number]]  (Normal) Collected: 07/05/21 1828    Lab Status: Final result Specimen: Swab from Nares Updated: 07/05/21 2036     MRSA PCR Negative    Narrative:      MRSA Negative    COVID PRE-OP / PRE-PROCEDURE SCREENING ORDER (NO ISOLATION) - Swab, Nasopharynx [[id number]]  (Normal) Collected: 07/04/21 0302    Lab Status: Final result Specimen: Swab from Nasopharynx Updated: 07/04/21 0408    Narrative:      The following orders were created for panel order COVID PRE-OP / PRE-PROCEDURE SCREENING ORDER (NO ISOLATION) - Swab, Nasopharynx.  Procedure                               Abnormality         Status                     ---------                               -----------         ------                     COVID-19,CEPHEID,JORGE IN-...[[id number]]  Normal              Final result                 Please view results for these tests on the individual orders.    COVID-19,CEPHEID,JORGE IN-HOUSE(OR EMERGENT/ADD-ON),NP SWAB IN  TRANSPORT MEDIA 3-4 HR TAT - Swab, Nasopharynx [[id number]]  (Normal) Collected: 07/04/21 0302    Lab Status: Final result Specimen: Swab from Nasopharynx Updated: 07/04/21 0408     COVID19 Not Detected    Narrative:      Fact sheet for providers: https://www.fda.gov/media/745832/download     Fact sheet for patients: https://www.fda.gov/media/117773/download  Fact sheet for providers: https://www.fda.gov/media/058210/download     Fact sheet for patients: https://www.fda.gov/media/175346/download          Imaging Results (Last 24 Hours)     ** No results found for the last 24 hours. **              I have reviewed the medications:  Scheduled Meds:DAPTOmycin, 6 mg/kg (Adjusted), Intravenous, Q24H  docusate sodium, 100 mg, Oral, BID  enoxaparin, 40 mg, Subcutaneous, Q24H  piperacillin-tazobactam, 4.5 g, Intravenous, Q8H  polyethylene glycol, 17 g, Oral, Daily  Sodium Chloride (PF), 10 mL, Intravenous, Q12H      Continuous Infusions:lactated ringers, 9 mL/hr, Last Rate: 9 mL/hr (07/09/21 1225)  sodium chloride, 100 mL/hr, Last Rate: 100 mL/hr (07/10/21 0546)      PRN Meds:.•  acetaminophen  •  [MAR Hold] bisacodyl  •  HYDROcodone-acetaminophen  •  [MAR Hold] HYDROmorphone  •  HYDROmorphone  •  magnesium sulfate **OR** magnesium sulfate **OR** magnesium sulfate  •  ondansetron  •  ondansetron  •  [MAR Hold] phenol  •  potassium chloride **OR** potassium chloride **OR** potassium chloride    Assessment/Plan   Assessment & Plan     Active Hospital Problems    Diagnosis  POA   • Intra-abdominal abscess post-procedure [T81.43XA]  Yes   • Lower abdominal pain [R10.30]  Unknown   • Status post hysterectomy [Z90.710]  Unknown      Resolved Hospital Problems   No resolved problems to display.        Brief Hospital Course to date:  Anisa Sosa is a 31 y.o. female with history of elective laparoscopic hysterectomy in [place] on May 27.  She recently reports increased abdominal pain and anorexia as well as no fevers.  CT  abdomen pelvis here showed extensive peritoneal inflammation throughout the pelvis with multiple small peripherally enhancing interloop fluid collections scattered in the low pelvis with the largest measuring 5.7 cm in the perirectal area.    Fever  Leukocytosis  Intra-abdominal abscesses  - afebrile for last 24 hours  - Leukocytosis had resolved, but now back up again,  CRP increased to 34  -Continue daptomycin/zosyn; consider antifugals if clinical worsening  -no discrete walled abscess amenable for drainage on 7/5. D/w Dr. Frias and Dr. Robertson, repeated CT scan 7/8 to see if there any collection of fluid amenable to drainage. Not enough fluid for drainage by IR.    --s/p diagnostic laparoscopy 7/9 by Margaret Robertson and Remington. Noted multiple adhesions s/p peritoneal lavage with 1L saline with polymycin.  Vaginal cuff with thick, white drainage (culture PENDING).   -Pain control with current regimen  --encouraged ambulation    DVT prophylaxis: lovenox    Disposition: I expect the patient to be discharged home tbd    CODE STATUS:   Code Status and Medical Interventions:   Ordered at: 07/04/21 0418     Level Of Support Discussed With:    Patient     Code Status:    CPR     Medical Interventions (Level of Support Prior to Arrest):    Full       Maribel Varela MD  07/10/21

## 2021-07-11 ENCOUNTER — APPOINTMENT (OUTPATIENT)
Dept: GENERAL RADIOLOGY | Facility: HOSPITAL | Age: 31
End: 2021-07-11

## 2021-07-11 LAB
ANION GAP SERPL CALCULATED.3IONS-SCNC: 8 MMOL/L (ref 5–15)
BUN SERPL-MCNC: 4 MG/DL (ref 6–20)
BUN/CREAT SERPL: 6.2 (ref 7–25)
CALCIUM SPEC-SCNC: 8.8 MG/DL (ref 8.6–10.5)
CHLORIDE SERPL-SCNC: 106 MMOL/L (ref 98–107)
CK SERPL-CCNC: 41 U/L (ref 20–180)
CO2 SERPL-SCNC: 22 MMOL/L (ref 22–29)
CREAT SERPL-MCNC: 0.65 MG/DL (ref 0.57–1)
DEPRECATED RDW RBC AUTO: 45 FL (ref 37–54)
ERYTHROCYTE [DISTWIDTH] IN BLOOD BY AUTOMATED COUNT: 13.2 % (ref 12.3–15.4)
GFR SERPL CREATININE-BSD FRML MDRD: 128 ML/MIN/1.73
GLUCOSE SERPL-MCNC: 93 MG/DL (ref 65–99)
HCT VFR BLD AUTO: 30.8 % (ref 34–46.6)
HGB BLD-MCNC: 9.7 G/DL (ref 12–15.9)
MCH RBC QN AUTO: 29.9 PG (ref 26.6–33)
MCHC RBC AUTO-ENTMCNC: 31.5 G/DL (ref 31.5–35.7)
MCV RBC AUTO: 95.1 FL (ref 79–97)
PLATELET # BLD AUTO: 359 10*3/MM3 (ref 140–450)
PMV BLD AUTO: 10.4 FL (ref 6–12)
POTASSIUM SERPL-SCNC: 4.1 MMOL/L (ref 3.5–5.2)
RBC # BLD AUTO: 3.24 10*6/MM3 (ref 3.77–5.28)
SODIUM SERPL-SCNC: 136 MMOL/L (ref 136–145)
WBC # BLD AUTO: 13.33 10*3/MM3 (ref 3.4–10.8)

## 2021-07-11 PROCEDURE — 80048 BASIC METABOLIC PNL TOTAL CA: CPT | Performed by: INTERNAL MEDICINE

## 2021-07-11 PROCEDURE — 25010000002 ENOXAPARIN PER 10 MG: Performed by: INTERNAL MEDICINE

## 2021-07-11 PROCEDURE — 99024 POSTOP FOLLOW-UP VISIT: CPT | Performed by: OBSTETRICS & GYNECOLOGY

## 2021-07-11 PROCEDURE — 99232 SBSQ HOSP IP/OBS MODERATE 35: CPT | Performed by: INTERNAL MEDICINE

## 2021-07-11 PROCEDURE — 25010000002 HYDROMORPHONE PER 4 MG: Performed by: PHYSICIAN ASSISTANT

## 2021-07-11 PROCEDURE — 74018 RADEX ABDOMEN 1 VIEW: CPT

## 2021-07-11 PROCEDURE — 85027 COMPLETE CBC AUTOMATED: CPT | Performed by: INTERNAL MEDICINE

## 2021-07-11 PROCEDURE — 25010000002 DAPTOMYCIN PER 1 MG: Performed by: PHYSICIAN ASSISTANT

## 2021-07-11 PROCEDURE — 25010000002 PIPERACILLIN SOD-TAZOBACTAM PER 1 G: Performed by: INTERNAL MEDICINE

## 2021-07-11 PROCEDURE — 82550 ASSAY OF CK (CPK): CPT

## 2021-07-11 RX ORDER — BISACODYL 10 MG
10 SUPPOSITORY, RECTAL RECTAL DAILY PRN
Status: DISCONTINUED | OUTPATIENT
Start: 2021-07-11 | End: 2021-07-12 | Stop reason: HOSPADM

## 2021-07-11 RX ORDER — BISACODYL 10 MG
10 SUPPOSITORY, RECTAL RECTAL DAILY
Status: DISCONTINUED | OUTPATIENT
Start: 2021-07-11 | End: 2021-07-11

## 2021-07-11 RX ADMIN — HYDROMORPHONE HYDROCHLORIDE 0.5 MG: 1 INJECTION, SOLUTION INTRAMUSCULAR; INTRAVENOUS; SUBCUTANEOUS at 08:24

## 2021-07-11 RX ADMIN — SODIUM CHLORIDE 10 ML: 9 INJECTION, SOLUTION INTRAMUSCULAR; INTRAVENOUS; SUBCUTANEOUS at 20:53

## 2021-07-11 RX ADMIN — HYDROMORPHONE HYDROCHLORIDE 0.5 MG: 1 INJECTION, SOLUTION INTRAMUSCULAR; INTRAVENOUS; SUBCUTANEOUS at 01:51

## 2021-07-11 RX ADMIN — HYDROCODONE BITARTRATE AND ACETAMINOPHEN 1 TABLET: 7.5; 325 TABLET ORAL at 15:15

## 2021-07-11 RX ADMIN — HYDROCODONE BITARTRATE AND ACETAMINOPHEN 1 TABLET: 7.5; 325 TABLET ORAL at 00:09

## 2021-07-11 RX ADMIN — HYDROCODONE BITARTRATE AND ACETAMINOPHEN 1 TABLET: 7.5; 325 TABLET ORAL at 20:52

## 2021-07-11 RX ADMIN — TAZOBACTAM SODIUM AND PIPERACILLIN SODIUM 4.5 G: 500; 4 INJECTION, SOLUTION INTRAVENOUS at 20:53

## 2021-07-11 RX ADMIN — ACETAMINOPHEN 650 MG: 325 TABLET ORAL at 06:13

## 2021-07-11 RX ADMIN — ACETAMINOPHEN 650 MG: 325 TABLET ORAL at 18:21

## 2021-07-11 RX ADMIN — DAPTOMYCIN 450 MG: 500 INJECTION, POWDER, LYOPHILIZED, FOR SOLUTION INTRAVENOUS at 17:42

## 2021-07-11 RX ADMIN — POLYETHYLENE GLYCOL 3350 17 G: 17 POWDER, FOR SOLUTION ORAL at 08:15

## 2021-07-11 RX ADMIN — TAZOBACTAM SODIUM AND PIPERACILLIN SODIUM 4.5 G: 500; 4 INJECTION, SOLUTION INTRAVENOUS at 11:14

## 2021-07-11 RX ADMIN — DOCUSATE SODIUM 100 MG: 100 CAPSULE, LIQUID FILLED ORAL at 20:53

## 2021-07-11 RX ADMIN — HYDROCODONE BITARTRATE AND ACETAMINOPHEN 1 TABLET: 7.5; 325 TABLET ORAL at 06:13

## 2021-07-11 RX ADMIN — DOCUSATE SODIUM 100 MG: 100 CAPSULE, LIQUID FILLED ORAL at 08:15

## 2021-07-11 RX ADMIN — HYDROCODONE BITARTRATE AND ACETAMINOPHEN 1 TABLET: 7.5; 325 TABLET ORAL at 11:14

## 2021-07-11 RX ADMIN — ENOXAPARIN SODIUM 40 MG: 40 INJECTION SUBCUTANEOUS at 08:15

## 2021-07-11 RX ADMIN — BISACODYL 10 MG: 10 SUPPOSITORY RECTAL at 12:49

## 2021-07-11 RX ADMIN — SODIUM CHLORIDE 10 ML: 9 INJECTION, SOLUTION INTRAMUSCULAR; INTRAVENOUS; SUBCUTANEOUS at 08:24

## 2021-07-11 RX ADMIN — TAZOBACTAM SODIUM AND PIPERACILLIN SODIUM 4.5 G: 500; 4 INJECTION, SOLUTION INTRAVENOUS at 04:10

## 2021-07-11 NOTE — PLAN OF CARE
Goal Outcome Evaluation:  Plan of Care Reviewed With: patient        Progress: no change  Outcome Summary: Vitals stable this shift. Compains od abd pain frequently. Two doses norco given with some releif reported. Patient up to bathroom at 0155 and patient c/o abd pain discribed as sharp and stabbing. Lab sites dressings clean dry and intact. Abd soft with normal bowel sounds. PRN dilaudid given. Patient able to sleep after administration. Patient showered early in shift. Dressings on lap sites changed. Patient educated on ambulation to promote BM. Patient ambulated in truong. Five laps made in unit. Prune juice given. No bowel movement this shift. Patient c/o abd pain this morning. 7/10 described as stabbing sharp and tight. Abd assessment without change. No noted swelling. Lab site dressings clean dry and intact. PRN Narco administered.

## 2021-07-11 NOTE — PROGRESS NOTES
2021    Name:Anisa Sosa   MR#:[id number]      GYN Progress Note       HD:7    Subjective   31 y.o.yo  POD#2 from Dx LPS in the context of intraabdominal post operative infection after outside TLH  She feels better.   Tolerating a regular diet  Voiding well  Passing Flatus No BM today  She has ambulated very little today    Patient Active Problem List   Diagnosis   • Intra-abdominal abscess post-procedure   • Lower abdominal pain   • Status post hysterectomy       Objective     Vital Signs Range for the last 24 hours  Temp: Temp:  [97.8 °F (36.6 °C)-98.2 °F (36.8 °C)] 97.9 °F (36.6 °C) Temp src: Oral  BP: BP: (107-132)/(74-96) 116/84   BP Readings from Last 3 Encounters:   21 116/84     Pulse: Heart Rate:  [59-81] 65   Pulse Readings from Last 3 Encounters:   21 65     Resp:  Resp:  [15-18] 16    I/O last 3 completed shifts:  In: 200 [IV Piggyback:200]  Out: 1200 [Urine:1200]  No intake/output data recorded.      Results from last 7 days   Lab Units 21  0711 07/10/21  0842 21  0552 21  0756   WBC 10*3/mm3 13.33* 15.93* 12.69* 10.08   HEMOGLOBIN g/dL 9.7* 10.6* 9.7* 11.0*   HEMATOCRIT % 30.8* 32.9* 29.9* 33.5*   PLATELETS 10*3/mm3 359 341 256 170   MONOCYTES % %  --   --   --  3.0*     Results from last 7 days   Lab Units 21  0711 21  2335 21  0612   SODIUM mmol/L 136   < > 135*   POTASSIUM mmol/L 4.1  --  3.3*   CHLORIDE mmol/L 106   < > 98   CO2 mmol/L 22.0   < > 29.0   BUN mg/dL 4*   < > 3*   CREATININE mg/dL 0.65   < > 0.63   CALCIUM mg/dL 8.8   < > 8.6   BILIRUBIN mg/dL  --   --  0.3   ALK PHOS U/L  --   --  69   ALT (SGPT) U/L  --   --  6   AST (SGOT) U/L  --   --  11   GLUCOSE mg/dL 93   < > 121*    < > = values in this interval not displayed.     Results from last 7 days   Lab Units 21  0711   SODIUM mmol/L 136   POTASSIUM mmol/L 4.1   CHLORIDE mmol/L 106   CO2 mmol/L 22.0   BUN mg/dL 4*   CREATININE mg/dL 0.65   GLUCOSE mg/dL 93    CALCIUM mg/dL 8.8         Physical Exam:  General Appearance:  awake, alert, oriented, in no acute distress  Head/face:  NCAT  Lungs:  Normal expansion.  Clear to auscultation.  No rales, rhonchi, or wheezing.  Heart:  Heart sounds are normal.  Regular rate and rhythm without murmur, gallop or rub.  Abdomen:  Skin: clean, dry, intact ecchymosis- at port sites  Auscultation: not performed  Palpation: soft. Non-distended  Extremities: NTTP, no edema, SCDS bilaterally      Assessment/Plan   1.  Post op Day 1 DxLPS  2.  Severe Post operative infection after TLH at outside facility. Improving after DxLPS and IV antibitoics  I would anticipate her continuing on IV antibiotics another 48 hours and then assess for discharge. Will certainly follow ID rec        Angelo Houston MD  7/11/2021 09:40 EDT

## 2021-07-11 NOTE — PROGRESS NOTES
[facility] Medicine Services  PROGRESS NOTE    Patient Name: Anisa Sosa  : 1990  MRN: [id number]    Date of Admission: 7/3/2021  Primary Care Physician: Provider, No Known    Subjective   Subjective     CC:  Abdominal pain    HPI:   Pt complaining of being sore still and worsening abdominal distention despite passing flatus and ambulating all day yesterday. Still no BM.     ROS:  Gen- no fevers, chills  CV- No chest pain, palpitations  Resp- No cough, dyspnea  GI- + abd pain        Objective   Objective     Vital Signs:   Temp:  [97.8 °F (36.6 °C)-98.2 °F (36.8 °C)] 97.9 °F (36.6 °C)  Heart Rate:  [59-81] 65  Resp:  [15-18] 16  BP: (107-132)/(74-96) 116/84        Physical Exam:  Constitutional - no acute distress, nontoxic, in bed  HEENT-NCAT, mucous membranes moist  CV-RRR, S1 S2 normal, no m/r/g  Resp-CTAB, no wheezes, rhonchi or rales  Abd-soft, distended, tender to light palpation diffusely, hypoactive BS  Ext-No lower extremity cyanosis, clubbing or edema bilaterally  Neuro-alert and oriented x 3, speech clear, moves all extremities   Psych-normal affect   Skin- No rash on exposed UE or LE bilaterally    Results Reviewed:  Results from last 7 days   Lab Units 21  0711 07/10/21  0842 21  0552 21  0756   WBC 10*3/mm3 13.33* 15.93* 12.69* 10.08   HEMOGLOBIN g/dL 9.7* 10.6* 9.7* 11.0*   HEMATOCRIT % 30.8* 32.9* 29.9* 33.5*   PLATELETS 10*3/mm3 359 341 256 170   PROCALCITONIN ng/mL  --   --   --  0.21     Results from last 7 days   Lab Units 21  0711 07/10/21  0842 21  0552 21  2335 21  0612 21  0713   SODIUM mmol/L 136 139 139   < > 135* 137   POTASSIUM mmol/L 4.1 4.3 3.5  --  3.3* 2.6*   CHLORIDE mmol/L 106 107 103   < > 98 98   CO2 mmol/L 22.0 25.0 24.0   < > 29.0 31.0*   BUN mg/dL 4* 6 4*   < > 3* 2*   CREATININE mg/dL 0.65 0.63 0.58   < > 0.63 0.62   GLUCOSE mg/dL 93 122* 89   < > 121* 98   CALCIUM mg/dL 8.8 9.3 8.5*   < >  8.6 8.7   ALT (SGPT) U/L  --   --   --   --  6 6   AST (SGOT) U/L  --   --   --   --  11 13    < > = values in this interval not displayed.     Estimated Creatinine Clearance: 156.9 mL/min (by C-G formula based on SCr of 0.65 mg/dL).    Microbiology Results Abnormal     Procedure Component Value - Date/Time    Genital Culture - Vaginal Fluid, Vagina [[id number]] Collected: 07/09/21 1523    Lab Status: Preliminary result Specimen: Vaginal Fluid Updated: 07/10/21 0817     Genital Culture Culture in progress     Gram Stain Rare (1+) WBCs seen      Rare (1+) Gram negative bacilli    Blood Culture - Blood, Arm, Right [[id number]] Collected: 07/04/21 0017    Lab Status: Final result Specimen: Blood from Arm, Right Updated: 07/09/21 0445     Blood Culture No growth at 5 days    Blood Culture - Blood, Arm, Left [[id number]] Collected: 07/04/21 0017    Lab Status: Final result Specimen: Blood from Arm, Left Updated: 07/09/21 0445     Blood Culture No growth at 5 days    MRSA Screen, PCR (Inpatient) - Swab, Nares [[id number]]  (Normal) Collected: 07/05/21 1828    Lab Status: Final result Specimen: Swab from Nares Updated: 07/05/21 2036     MRSA PCR Negative    Narrative:      MRSA Negative    COVID PRE-OP / PRE-PROCEDURE SCREENING ORDER (NO ISOLATION) - Swab, Nasopharynx [[id number]]  (Normal) Collected: 07/04/21 0302    Lab Status: Final result Specimen: Swab from Nasopharynx Updated: 07/04/21 0408    Narrative:      The following orders were created for panel order COVID PRE-OP / PRE-PROCEDURE SCREENING ORDER (NO ISOLATION) - Swab, Nasopharynx.  Procedure                               Abnormality         Status                     ---------                               -----------         ------                     COVID-19,CEPHEID,JORGE IN-...[[id number]]  Normal              Final result                 Please view results for these tests on the individual orders.    COVID-19,CEPHEID,JORGE IN-HOUSE(OR EMERGENT/ADD-ON),NP SWAB IN  TRANSPORT MEDIA 3-4 HR TAT - Swab, Nasopharynx [[id number]]  (Normal) Collected: 07/04/21 0302    Lab Status: Final result Specimen: Swab from Nasopharynx Updated: 07/04/21 0408     COVID19 Not Detected    Narrative:      Fact sheet for providers: https://www.fda.gov/media/034259/download     Fact sheet for patients: https://www.fda.gov/media/940572/download  Fact sheet for providers: https://www.fda.gov/media/651987/download     Fact sheet for patients: https://www.fda.gov/media/763192/download          Imaging Results (Last 24 Hours)     ** No results found for the last 24 hours. **              I have reviewed the medications:  Scheduled Meds:DAPTOmycin, 6 mg/kg (Adjusted), Intravenous, Q24H  docusate sodium, 100 mg, Oral, BID  enoxaparin, 40 mg, Subcutaneous, Q24H  piperacillin-tazobactam, 4.5 g, Intravenous, Q8H  polyethylene glycol, 17 g, Oral, Daily  Sodium Chloride (PF), 10 mL, Intravenous, Q12H      Continuous Infusions:lactated ringers, 9 mL/hr, Last Rate: 9 mL/hr (07/09/21 1225)  sodium chloride, 100 mL/hr, Last Rate: 100 mL/hr (07/10/21 0546)      PRN Meds:.•  acetaminophen  •  [MAR Hold] bisacodyl  •  HYDROcodone-acetaminophen  •  magnesium sulfate **OR** magnesium sulfate **OR** magnesium sulfate  •  ondansetron  •  ondansetron  •  [MAR Hold] phenol  •  potassium chloride **OR** potassium chloride **OR** potassium chloride    Assessment/Plan   Assessment & Plan     Active Hospital Problems    Diagnosis  POA   • Intra-abdominal abscess post-procedure [T81.43XA]  Yes   • Lower abdominal pain [R10.30]  Unknown   • Status post hysterectomy [Z90.710]  Unknown      Resolved Hospital Problems   No resolved problems to display.        Brief Hospital Course to date:  Anisa Sosa is a 31 y.o. female with history of elective laparoscopic hysterectomy in [place] on May 27.  She recently reports increased abdominal pain and anorexia as well as no fevers.  CT abdomen pelvis here showed extensive peritoneal  inflammation throughout the pelvis with multiple small peripherally enhancing interloop fluid collections scattered in the low pelvis with the largest measuring 5.7 cm in the perirectal area.    Fever  Leukocytosis  Intra-abdominal abscesses  - afebrile for last 24 hours  - Leukocytosis had resolved, then increased to 15K now improving again  -- stop IVFs as pt is tolerating PO diet  -Continue daptomycin/zosyn  -no discrete walled abscess amenable for drainage on 7/5. D/w Dr. Frias and Dr. Robertson, repeated CT scan 7/8 to see if there any collection of fluid amenable to drainage. Not enough fluid for drainage by IR.    --s/p diagnostic laparoscopy 7/9 by Margaret Robertson and Remington. Noted multiple adhesions s/p peritoneal lavage with 1L saline with polymycin.  Vaginal cuff with thick, white drainage (culture PENDING).   -Pain control with current regimen  --encouraged ambulation  -- check KUB given distention and increased discomfort on exam this am.  Hopefully symptoms will resolve with BM    DVT prophylaxis: lovenox    Disposition: I expect the patient to be discharged home tbd    CODE STATUS:   Code Status and Medical Interventions:   Ordered at: 07/04/21 0418     Level Of Support Discussed With:    Patient     Code Status:    CPR     Medical Interventions (Level of Support Prior to Arrest):    Full       Maribel Varela MD  07/11/21

## 2021-07-12 ENCOUNTER — TRANSCRIBE ORDERS (OUTPATIENT)
Dept: ADMINISTRATIVE | Facility: HOSPITAL | Age: 31
End: 2021-07-12

## 2021-07-12 ENCOUNTER — TELEPHONE (OUTPATIENT)
Dept: FAMILY MEDICINE CLINIC | Facility: CLINIC | Age: 31
End: 2021-07-12

## 2021-07-12 VITALS
SYSTOLIC BLOOD PRESSURE: 115 MMHG | HEIGHT: 70 IN | OXYGEN SATURATION: 96 % | RESPIRATION RATE: 18 BRPM | TEMPERATURE: 98 F | HEART RATE: 86 BPM | DIASTOLIC BLOOD PRESSURE: 85 MMHG | BODY MASS INDEX: 28.99 KG/M2 | WEIGHT: 202.5 LBS

## 2021-07-12 DIAGNOSIS — K65.1 PERITONEAL ABSCESS (HCC): Primary | ICD-10-CM

## 2021-07-12 LAB
ANION GAP SERPL CALCULATED.3IONS-SCNC: 11 MMOL/L (ref 5–15)
BACTERIA SPEC AEROBE CULT: ABNORMAL
BASOPHILS # BLD AUTO: 0.03 10*3/MM3 (ref 0–0.2)
BASOPHILS NFR BLD AUTO: 0.2 % (ref 0–1.5)
BUN SERPL-MCNC: 8 MG/DL (ref 6–20)
BUN/CREAT SERPL: 10.3 (ref 7–25)
CALCIUM SPEC-SCNC: 9.2 MG/DL (ref 8.6–10.5)
CHLORIDE SERPL-SCNC: 105 MMOL/L (ref 98–107)
CO2 SERPL-SCNC: 23 MMOL/L (ref 22–29)
CREAT SERPL-MCNC: 0.78 MG/DL (ref 0.57–1)
DEPRECATED RDW RBC AUTO: 46.9 FL (ref 37–54)
EOSINOPHIL # BLD AUTO: 0.12 10*3/MM3 (ref 0–0.4)
EOSINOPHIL NFR BLD AUTO: 0.7 % (ref 0.3–6.2)
ERYTHROCYTE [DISTWIDTH] IN BLOOD BY AUTOMATED COUNT: 13.2 % (ref 12.3–15.4)
GFR SERPL CREATININE-BSD FRML MDRD: 104 ML/MIN/1.73
GLUCOSE SERPL-MCNC: 87 MG/DL (ref 65–99)
GRAM STN SPEC: ABNORMAL
GRAM STN SPEC: ABNORMAL
HCT VFR BLD AUTO: 31.7 % (ref 34–46.6)
HGB BLD-MCNC: 10.1 G/DL (ref 12–15.9)
IMM GRANULOCYTES # BLD AUTO: 0.11 10*3/MM3 (ref 0–0.05)
IMM GRANULOCYTES NFR BLD AUTO: 0.7 % (ref 0–0.5)
LYMPHOCYTES # BLD AUTO: 2.28 10*3/MM3 (ref 0.7–3.1)
LYMPHOCYTES NFR BLD AUTO: 14 % (ref 19.6–45.3)
MCH RBC QN AUTO: 30.9 PG (ref 26.6–33)
MCHC RBC AUTO-ENTMCNC: 31.9 G/DL (ref 31.5–35.7)
MCV RBC AUTO: 96.9 FL (ref 79–97)
MONOCYTES # BLD AUTO: 0.93 10*3/MM3 (ref 0.1–0.9)
MONOCYTES NFR BLD AUTO: 5.7 % (ref 5–12)
NEUTROPHILS NFR BLD AUTO: 12.77 10*3/MM3 (ref 1.7–7)
NEUTROPHILS NFR BLD AUTO: 78.7 % (ref 42.7–76)
NRBC BLD AUTO-RTO: 0 /100 WBC (ref 0–0.2)
PLATELET # BLD AUTO: 382 10*3/MM3 (ref 140–450)
PMV BLD AUTO: 10.3 FL (ref 6–12)
POTASSIUM SERPL-SCNC: 3.9 MMOL/L (ref 3.5–5.2)
RBC # BLD AUTO: 3.27 10*6/MM3 (ref 3.77–5.28)
SODIUM SERPL-SCNC: 139 MMOL/L (ref 136–145)
WBC # BLD AUTO: 16.24 10*3/MM3 (ref 3.4–10.8)

## 2021-07-12 PROCEDURE — C1751 CATH, INF, PER/CENT/MIDLINE: HCPCS

## 2021-07-12 PROCEDURE — 80048 BASIC METABOLIC PNL TOTAL CA: CPT | Performed by: INTERNAL MEDICINE

## 2021-07-12 PROCEDURE — C1894 INTRO/SHEATH, NON-LASER: HCPCS

## 2021-07-12 PROCEDURE — 25010000002 PIPERACILLIN SOD-TAZOBACTAM PER 1 G: Performed by: INTERNAL MEDICINE

## 2021-07-12 PROCEDURE — 85025 COMPLETE CBC W/AUTO DIFF WBC: CPT | Performed by: INTERNAL MEDICINE

## 2021-07-12 PROCEDURE — 99239 HOSP IP/OBS DSCHRG MGMT >30: CPT | Performed by: INTERNAL MEDICINE

## 2021-07-12 PROCEDURE — 25010000002 ENOXAPARIN PER 10 MG: Performed by: INTERNAL MEDICINE

## 2021-07-12 RX ORDER — ONDANSETRON 4 MG/1
4 TABLET, FILM COATED ORAL EVERY 6 HOURS PRN
Qty: 30 TABLET | Refills: 0 | Status: SHIPPED | OUTPATIENT
Start: 2021-07-12 | End: 2023-02-16

## 2021-07-12 RX ORDER — SODIUM CHLORIDE 0.9 % (FLUSH) 0.9 %
10 SYRINGE (ML) INJECTION AS NEEDED
Status: DISCONTINUED | OUTPATIENT
Start: 2021-07-12 | End: 2021-07-12 | Stop reason: HOSPADM

## 2021-07-12 RX ORDER — SODIUM CHLORIDE 0.9 % (FLUSH) 0.9 %
10 SYRINGE (ML) INJECTION EVERY 12 HOURS SCHEDULED
Status: DISCONTINUED | OUTPATIENT
Start: 2021-07-12 | End: 2021-07-12 | Stop reason: HOSPADM

## 2021-07-12 RX ORDER — HYDROCODONE BITARTRATE AND ACETAMINOPHEN 5; 325 MG/1; MG/1
1-2 TABLET ORAL EVERY 6 HOURS PRN
Qty: 30 TABLET | Refills: 0 | Status: SHIPPED | OUTPATIENT
Start: 2021-07-12 | End: 2021-08-02

## 2021-07-12 RX ORDER — HYDROCODONE BITARTRATE AND ACETAMINOPHEN 10; 325 MG/1; MG/1
1 TABLET ORAL EVERY 4 HOURS PRN
Status: DISCONTINUED | OUTPATIENT
Start: 2021-07-12 | End: 2021-07-12 | Stop reason: HOSPADM

## 2021-07-12 RX ORDER — HYDROCODONE BITARTRATE AND ACETAMINOPHEN 10; 325 MG/1; MG/1
1 TABLET ORAL EVERY 6 HOURS PRN
Status: DISCONTINUED | OUTPATIENT
Start: 2021-07-12 | End: 2021-07-12

## 2021-07-12 RX ORDER — SODIUM CHLORIDE 0.9 % (FLUSH) 0.9 %
20 SYRINGE (ML) INJECTION AS NEEDED
Status: DISCONTINUED | OUTPATIENT
Start: 2021-07-12 | End: 2021-07-12 | Stop reason: HOSPADM

## 2021-07-12 RX ORDER — PSEUDOEPHEDRINE HCL 30 MG
100 TABLET ORAL 2 TIMES DAILY
Qty: 60 CAPSULE | Refills: 1 | Status: SHIPPED | OUTPATIENT
Start: 2021-07-12 | End: 2023-02-16

## 2021-07-12 RX ADMIN — HYDROCODONE BITARTRATE AND ACETAMINOPHEN 1 TABLET: 10; 325 TABLET ORAL at 09:45

## 2021-07-12 RX ADMIN — ACETAMINOPHEN 650 MG: 325 TABLET ORAL at 07:23

## 2021-07-12 RX ADMIN — TAZOBACTAM SODIUM AND PIPERACILLIN SODIUM 4.5 G: 500; 4 INJECTION, SOLUTION INTRAVENOUS at 10:51

## 2021-07-12 RX ADMIN — HYDROCODONE BITARTRATE AND ACETAMINOPHEN 1 TABLET: 7.5; 325 TABLET ORAL at 05:54

## 2021-07-12 RX ADMIN — TAZOBACTAM SODIUM AND PIPERACILLIN SODIUM 4.5 G: 500; 4 INJECTION, SOLUTION INTRAVENOUS at 03:29

## 2021-07-12 RX ADMIN — DOCUSATE SODIUM 100 MG: 100 CAPSULE, LIQUID FILLED ORAL at 08:07

## 2021-07-12 RX ADMIN — ACETAMINOPHEN 650 MG: 325 TABLET ORAL at 00:18

## 2021-07-12 RX ADMIN — HYDROCODONE BITARTRATE AND ACETAMINOPHEN 1 TABLET: 7.5; 325 TABLET ORAL at 01:25

## 2021-07-12 RX ADMIN — HYDROCODONE BITARTRATE AND ACETAMINOPHEN 1 TABLET: 10; 325 TABLET ORAL at 14:03

## 2021-07-12 RX ADMIN — POLYETHYLENE GLYCOL 3350 17 G: 17 POWDER, FOR SOLUTION ORAL at 08:07

## 2021-07-12 RX ADMIN — ENOXAPARIN SODIUM 40 MG: 40 INJECTION SUBCUTANEOUS at 08:07

## 2021-07-12 NOTE — CASE MANAGEMENT/SOCIAL WORK
Continued Stay Note  [facility]     Patient Name: Anisa Sosa  MRN: [id number]  Today's Date: 7/12/2021    Admit Date: 7/3/2021    Discharge Plan     Row Name 07/12/21 1405       Plan    Plan  CM update  - Home with IV antbx with [facility]    Plan Comments  CM noted MD consult for home IV antbx. Patient has order for a Picc line to be placed. MD orders regarding home IV antbx has been faxed to [facility]. OPAT nurse to arrange IV antbx and infusion teaching at bedside prior to discharge. CM will be sure follow ups are made and in AVS - diego [phone]        Discharge Codes    No documentation.             Diego Farrell RN

## 2021-07-12 NOTE — PAYOR COMM NOTE
"Ref # [id number]  Tressa Martin RN, BSN  Phone # [phone]  Fax # [phone]  Anisa Sosa (31 y.o. Female)     Date of Birth Social Security Number Address Home Phone MRN    1990  [address] [address] [phone] [id number]    Confucianist Marital Status          Baptism Single       Admission Date Admission Type Admitting Provider Attending Provider Department, Room/Bed    7/3/21 Emergency Genia Rousseau DO Barbato, Hayley R, DO [facility] 6B, N624/1    Discharge Date Discharge Disposition Discharge Destination                       Attending Provider: Genia Rousseau DO    Allergies: No Known Allergies    Isolation: None   Infection: None   Code Status: CPR    Ht: 177.8 cm (70\")   Wt: 91.9 kg (202 lb 8 oz)    Admission Cmt: None   Principal Problem: None                Active Insurance as of 7/3/2021     Primary Coverage     Payor Plan Insurance Group Employer/Plan Group    [facility] [place] [facility] [employer] [facility] PPO [id number]     Payor Plan Address Payor Plan Phone Number Payor Plan Fax Number Effective Dates    [address] [phone]  1/1/2019 - None Entered    [facility] [id number]       Subscriber Name Subscriber Birth Date Member ID       ANISA SOSA 1990 [id number]                   Current Facility-Administered Medications   Medication Dose Route Frequency Provider Last Rate Last Admin   • acetaminophen (TYLENOL) tablet 650 mg  650 mg Oral Q6H PRN Angelo Houston MD   650 mg at 07/12/21 0723   • bisacodyl (DULCOLAX) suppository 10 mg  10 mg Rectal Daily PRN Angelo Houston MD   10 mg at 07/11/21 1249   • DAPTOmycin (CUBICIN) 450 mg in sodium chloride 0.9 % 50 mL IVPB  6 mg/kg (Adjusted) Intravenous Q24H Angelo Houston  mL/hr at 07/11/21 1742 450 mg at 07/11/21 1742   • docusate sodium (COLACE) capsule 100 mg  100 mg Oral BID Angelo Houston MD   100 mg at 07/12/21 0807   • enoxaparin (LOVENOX) " syringe 40 mg  40 mg Subcutaneous Q24H Angelo Houston MD   40 mg at 07/12/21 0807   • HYDROcodone-acetaminophen (NORCO)  MG per tablet 1 tablet  1 tablet Oral Q4H PRN Angelo Houston MD   1 tablet at 07/12/21 1403   • Magnesium Sulfate 2 gram Bolus, followed by 8 gram infusion (total Mg dose 10 grams)- Mg less than or equal to 1mg/dL  2 g Intravenous PRN Angelo Houston MD        Or   • Magnesium Sulfate 2 gram / 50mL Infusion (GIVE X 3 BAGS TO EQUAL 6GM TOTAL DOSE) - Mg 1.1 - 1.5 mg/dl  2 g Intravenous PRN Angelo Houston MD        Or   • Magnesium Sulfate 4 gram infusion- Mg 1.6-1.9 mg/dL  4 g Intravenous PRN Angelo Houston MD       • ondansetron (ZOFRAN) injection 4 mg  4 mg Intravenous Q6H PRN Angelo Houston MD       • ondansetron (ZOFRAN) tablet 4 mg  4 mg Oral Q6H PRN Angelo Houston MD       • phenol (CHLORASEPTIC) 1.4 % liquid 2 spray  2 spray Mouth/Throat Q2H PRN Angelo Houston MD       • piperacillin-tazobactam (ZOSYN) 4.5 g in iso-osmotic dextrose 100 mL IVPB (premix)  4.5 g Intravenous Q8H Angelo Houston MD   4.5 g at 07/12/21 1051   • polyethylene glycol (MIRALAX) packet 17 g  17 g Oral Daily Angelo Houston MD   17 g at 07/12/21 0807   • potassium chloride (MICRO-K) CR capsule 40 mEq  40 mEq Oral PRN Angelo Houston MD   40 mEq at 07/10/21 0143    Or   • potassium chloride (KLOR-CON) packet 40 mEq  40 mEq Oral PRN Angelo Houston MD        Or   • potassium chloride 10 mEq in 100 mL IVPB  10 mEq Intravenous Q1H PRN Angelo Houston MD       • Sodium Chloride (PF) 0.9 % 10 mL  10 mL Intravenous Q12H Angelo Houston MD   10 mL at 07/11/21 2053   • sodium chloride 0.9 % flush 10 mL  10 mL Intravenous Q12H Angelo Houston MD       • sodium chloride 0.9 % flush 10 mL  10 mL Intravenous PRN Angelo Houston MD       • sodium chloride 0.9 % flush 20 mL  20 mL Intravenous PRN Angelo Houston MD         Lab Results (last 48 hours)      Procedure Component Value Units Date/Time    Genital Culture - Vaginal Fluid, Vagina [[id number]]  (Abnormal) Collected: 07/09/21 1523    Specimen: Vaginal Fluid Updated: 07/12/21 0952     Genital Culture Light growth (2+) Gardnerella vaginalis     Comment: Beta lactamase negative.        Gram Stain Rare (1+) WBCs seen      Rare (1+) Gram negative bacilli    Anaerobic Culture - Vaginal Fluid, Vagina [[id number]] Collected: 07/09/21 1523    Specimen: Vaginal Fluid Updated: 07/12/21 0932     Anaerobic Culture No anaerobes isolated at 3 days    Basic Metabolic Panel [[id number]]  (Normal) Collected: 07/12/21 0335    Specimen: Blood Updated: 07/12/21 0619     Glucose 87 mg/dL      BUN 8 mg/dL      Creatinine 0.78 mg/dL      Sodium 139 mmol/L      Potassium 3.9 mmol/L      Chloride 105 mmol/L      CO2 23.0 mmol/L      Calcium 9.2 mg/dL      eGFR  African Amer 104 mL/min/1.73      BUN/Creatinine Ratio 10.3     Anion Gap 11.0 mmol/L     Narrative:      GFR Normal >60  Chronic Kidney Disease <60  Kidney Failure <15      CBC & Differential [[id number]]  (Abnormal) Collected: 07/12/21 0335    Specimen: Blood Updated: 07/12/21 0507    Narrative:      The following orders were created for panel order CBC & Differential.  Procedure                               Abnormality         Status                     ---------                               -----------         ------                     CBC Auto Differential[[id number]]        Abnormal            Final result                 Please view results for these tests on the individual orders.    CBC Auto Differential [[id number]]  (Abnormal) Collected: 07/12/21 0335    Specimen: Blood Updated: 07/12/21 0507     WBC 16.24 10*3/mm3      RBC 3.27 10*6/mm3      Hemoglobin 10.1 g/dL      Hematocrit 31.7 %      MCV 96.9 fL      MCH 30.9 pg      MCHC 31.9 g/dL      RDW 13.2 %      RDW-SD 46.9 fl      MPV 10.3 fL      Platelets 382 10*3/mm3      Neutrophil % 78.7 %      Lymphocyte % 14.0 %       Monocyte % 5.7 %      Eosinophil % 0.7 %      Basophil % 0.2 %      Immature Grans % 0.7 %      Neutrophils, Absolute 12.77 10*3/mm3      Lymphocytes, Absolute 2.28 10*3/mm3      Monocytes, Absolute 0.93 10*3/mm3      Eosinophils, Absolute 0.12 10*3/mm3      Basophils, Absolute 0.03 10*3/mm3      Immature Grans, Absolute 0.11 10*3/mm3      nRBC 0.0 /100 WBC     CK [[id number]]  (Normal) Collected: 07/11/21 0711    Specimen: Blood Updated: 07/11/21 2229     Creatine Kinase 41 U/L     Basic Metabolic Panel [[id number]]  (Abnormal) Collected: 07/11/21 0711    Specimen: Blood Updated: 07/11/21 0808     Glucose 93 mg/dL      BUN 4 mg/dL      Creatinine 0.65 mg/dL      Sodium 136 mmol/L      Potassium 4.1 mmol/L      Chloride 106 mmol/L      CO2 22.0 mmol/L      Calcium 8.8 mg/dL      eGFR  African Amer 128 mL/min/1.73      BUN/Creatinine Ratio 6.2     Anion Gap 8.0 mmol/L     Narrative:      GFR Normal >60  Chronic Kidney Disease <60  Kidney Failure <15      CBC (No Diff) [[id number]]  (Abnormal) Collected: 07/11/21 0711    Specimen: Blood Updated: 07/11/21 0738     WBC 13.33 10*3/mm3      RBC 3.24 10*6/mm3      Hemoglobin 9.7 g/dL      Hematocrit 30.8 %      MCV 95.1 fL      MCH 29.9 pg      MCHC 31.5 g/dL      RDW 13.2 %      RDW-SD 45.0 fl      MPV 10.4 fL      Platelets 359 10*3/mm3           Imaging Results (Last 48 Hours)     Procedure Component Value Units Date/Time    XR Abdomen KUB [[id number]] Collected: 07/11/21 1414     Updated: 07/11/21 2238    Narrative:      EXAMINATION: XR ABDOMEN KUB - 07/11/2021     INDICATION:  T81.43XA-Infection following a procedure, organ and space  surgical site, initial encounter; R10.30-Lower abdominal pain,  unspecified; Z90.710-Acquired absence of both cervix and uterus.  Abdominal pain. Distention.     COMPARISON: None.     FINDINGS: Gas is seen in normal caliber large and small bowel. Stomach  appears normally distended with air. No bowel wall edema, pneumatosis or  pathologic  intra-abdominal mass effect is seen. A couple of tiny  phleboliths are noted in the pelvis.       Impression:      Nonobstructive bowel gas pattern.      DICTATED:   07/11/2021  EDITED/ls :   07/11/2021     This report was finalized on 7/11/2021 10:35 PM by Dr. Froy Xavier MD.           Operative/Procedure Notes (last 48 hours) (Notes from 07/10/21 1429 through 07/12/21 1429)    No notes of this type exist for this encounter.          Physician Progress Notes (last 48 hours) (Notes from 07/10/21 1429 through 07/12/21 1429)      Titus Frias MD at 07/12/21 1224          INFECTIOUS DISEASE CONSULT/INITIAL HOSPITAL VISIT    Anisa Sosa  1990  [id number]    Date of Consult: 7/5/21  Admission Date: 7/3/2021      Requesting Provider: Walter Prieto MD  Evaluating Physician: Titus Frias MD    Reason for Consultation: intraabdominal abscesses after hysterectomy    History of present illness:    Patient is a 31 y.o. female with h/o STIs and abnormal uterine bleeding/uterine fibroids s/p lap vaginal-assisted hysterectomy in [place] on 5/27/21 (with ovaries retained) who followed up a week later and was placed on 5 days of Cipro for possible UTI. She did relatively well post-operatively with mild tenderness.  Last Sunday, she went to a tournament in [place] to watch her daughter and started feeling fatigued.  She slept for a couple of days, but on Tuesday, she began having severe abdominal pain in lower part of abdomen.  She has some dizziness and lightheadedness with the pain.  She went to [facility] ED on 6/30 with a CT scan at that time showing some inflammation of the pelvic area.  She was sent home on ibuprofen, pain meds, and Cipro.  She continued to have pain and pelvic pressure with no relief from medications or heating pad.  She had some difficulty breathing because of the bloating and pressure.  She had not had a BM since 6/29. She started having nausea and vomiting on  "7/1.  On 7/2, she had worsening pain and came to [facility] ED on 7/3.  A CT scan here showed interloop abscesses 1 to 2 cm in sizes and a 5.7 cm abscess near the rectum.  A CT-guided aspiration of prerectal abscess was attempted today but aborted d/t the fluid collection being mobile.  Her Tmax since arrival has been 102.5.  Pertinent labs are PCT 0.3, lactic acid 0.8, WBC 14,400 with 90% neutrophils, creatinine 0.72, CRP 31.7, and UA WBC 3-5. Blood cultures are negative to date. A COVID-19 PCR was negative. She is currently on Vancomycin and Zosyn.  ID was asked to evaluate and manage her antibiotic therapy.       Subjective:    7/6/21: The patient did have some chills last night but fevers seem to be better today.  Tmax overnight was 100.9°F.  She did have some hypokalemia to a potassium of 2.6.  Pain is well controlled but still occasionally having some left lower quadrant and central abdominal pain. Only some mild nausea that is controlled with Zofran.  No bowel movement yet.       7/7/21: Still having some lower abdominal pain but no worse.  Having nausea that is well controlled with Zofran.  Fevers have improved today with Tmax 99.7°F.  She is tolerating the antibiotics well without any adverse side effects.    7/8/21: the patient is doing ok today. Abdominal pain is no worse. Tolerating diet. No n/v or diarrhea. No fevers. CT A/P with some improvement in perirectal fluid collection but some enlargement of other fluid collections. Plan is for surgery tomorrow.     7/10/21: the patient was taken to OR yesterday and intraoperative findings of \"colon and small bowel adhesions to anterior abdominal wall with inability to visualize the lower abdomen and pelvis. Vaginal cuff was intact but indurated with a small amount of spontaneous drainage that was sent for culture.\" No fevers. Abdominal pain is better today per the patient. No n/v.     7/12/21: the patient is doing better. Abdominal pain improved. No fevers. " Tolerating abx well without ADRs. No n/v, diarrhea, or new rash. Vaginal cx with Gardnerella vaginalis.    Past Medical History:   Diagnosis Date   • Anemia    • History of chlamydia    • History of trichomonal vaginitis        Past Surgical History:   Procedure Laterality Date   •  SECTION     •  SECTION     •  SECTION WITH TUBAL  2013   • LAPAROSCOPIC ASSISTED VAGINAL HYSTERECTOMY  2021    for AUB-L, pelvic pain. Ovaries retained per patient. Dr. Avani Horta.        Family History   Problem Relation Age of Onset   • Breast cancer Maternal Grandmother    • Ovarian cancer Neg Hx    • Colon cancer Neg Hx        Social History     Socioeconomic History   • Marital status: Single     Spouse name: Not on file   • Number of children: Not on file   • Years of education: Not on file   • Highest education level: Not on file   Tobacco Use   • Smoking status: Former Smoker   • Smokeless tobacco: Never Used   • Tobacco comment: off and on for 3 years    Vaping Use   • Vaping Use: Never used   Substance and Sexual Activity   • Alcohol use: Yes     Comment: occ   • Drug use: Never   • Sexual activity: Defer       No Known Allergies      Medication:    Current Facility-Administered Medications:   •  acetaminophen (TYLENOL) tablet 650 mg, 650 mg, Oral, Q6H PRN, Maribel Varela MD, 650 mg at 21 0723  •  bisacodyl (DULCOLAX) suppository 10 mg, 10 mg, Rectal, Daily PRN, Maribel Varela MD, 10 mg at 21 1249  •  DAPTOmycin (CUBICIN) 450 mg in sodium chloride 0.9 % 50 mL IVPB, 6 mg/kg (Adjusted), Intravenous, Q24H, Girish Martin PA, Last Rate: 100 mL/hr at 21 1742, 450 mg at 21 1742  •  docusate sodium (COLACE) capsule 100 mg, 100 mg, Oral, BID, Maribel Varela MD, 100 mg at 21 0807  •  enoxaparin (LOVENOX) syringe 40 mg, 40 mg, Subcutaneous, Q24H, Maribel Varela MD, 40 mg at 21 0807  •  HYDROcodone-acetaminophen (NORCO)   MG per tablet 1 tablet, 1 tablet, Oral, Q6H PRN, Genia Rousseau, DO, 1 tablet at 07/12/21 0945  •  Magnesium Sulfate 2 gram Bolus, followed by 8 gram infusion (total Mg dose 10 grams)- Mg less than or equal to 1mg/dL, 2 g, Intravenous, PRN **OR** Magnesium Sulfate 2 gram / 50mL Infusion (GIVE X 3 BAGS TO EQUAL 6GM TOTAL DOSE) - Mg 1.1 - 1.5 mg/dl, 2 g, Intravenous, PRN **OR** Magnesium Sulfate 4 gram infusion- Mg 1.6-1.9 mg/dL, 4 g, Intravenous, PRN, Maribel Varela MD  •  ondansetron (ZOFRAN) injection 4 mg, 4 mg, Intravenous, Q6H PRN, Maribel Varela MD  •  ondansetron (ZOFRAN) tablet 4 mg, 4 mg, Oral, Q6H PRN, Maribel Varela MD  •  [MAR Hold] phenol (CHLORASEPTIC) 1.4 % liquid 2 spray, 2 spray, Mouth/Throat, Q2H PRN, Tres Brannona, APRN  •  piperacillin-tazobactam (ZOSYN) 4.5 g in iso-osmotic dextrose 100 mL IVPB (premix), 4.5 g, Intravenous, Q8H, Titus Frias MD, 4.5 g at 07/12/21 1051  •  polyethylene glycol (MIRALAX) packet 17 g, 17 g, Oral, Daily, Maribel Varela MD, 17 g at 07/12/21 0807  •  potassium chloride (MICRO-K) CR capsule 40 mEq, 40 mEq, Oral, PRN, 40 mEq at 07/10/21 0143 **OR** potassium chloride (KLOR-CON) packet 40 mEq, 40 mEq, Oral, PRN **OR** potassium chloride 10 mEq in 100 mL IVPB, 10 mEq, Intravenous, Q1H PRN, Walter Prieto MD  •  Sodium Chloride (PF) 0.9 % 10 mL, 10 mL, Intravenous, Q12H, Maribel Varela MD, 10 mL at 07/11/21 2053    Antibiotics:  Anti-Infectives (From admission, onward)    Ordered     Dose/Rate Route Frequency Start Stop    07/05/21 1631  DAPTOmycin (CUBICIN) 450 mg in sodium chloride 0.9 % 50 mL IVPB     Neri Prater [facility] reviewed the order on 07/06/21 0722.   Ordering Provider: Girish Martin PA    6 mg/kg × 75.6 kg (Adjusted)  100 mL/hr over 30 Minutes Intravenous Every 24 Hours 07/05/21 1800 07/19/21 1759    07/04/21 0423  piperacillin-tazobactam (ZOSYN) 4.5 g in iso-osmotic dextrose 100 mL IVPB (premix)      Titus Frias MD reviewed the order on 21 0851.   Ordering Provider: Titus Frias MD    4.5 g  over 4 Hours Intravenous Every 8 Hours 21 1200 21 1159    21 0423  piperacillin-tazobactam (ZOSYN) 4.5 g in iso-osmotic dextrose 100 mL IVPB (premix)     Ordering Provider: Tierney Martinez MD    4.5 g  over 30 Minutes Intravenous Once 21 0600 21 0528    21 2355  vancomycin 1750 mg/500 mL 0.9% NS IVPB ([facility])     Ordering Provider: Lito Machuca DO    20 mg/kg × 83.9 kg  over 120 Minutes Intravenous Once 21 2357 21 0250    21 2355  piperacillin-tazobactam (ZOSYN) 3.375 g in iso-osmotic dextrose 50 ml (premix)     Ordering Provider: Lito Machuca DO    3.375 g  over 30 Minutes Intravenous Once 21 2357 21 0140            Review of Systems:  As above    Physical Exam:   Vital Signs  Temp (24hrs), Av.5 °F (36.9 °C), Min:98.1 °F (36.7 °C), Max:98.9 °F (37.2 °C)    Temp  Min: 98.1 °F (36.7 °C)  Max: 98.9 °F (37.2 °C)  BP  Min: 117/80  Max: 130/92  Pulse  Min: 68  Max: 85  Resp  Min: 14  Max: 16  SpO2  Min: 96 %  Max: 97 %    GENERAL: Awake and alert, in no acute distress. Sitting in bedside chair  HEENT: Normocephalic, atraumatic.  No labial ulcers noted  HEART: RRR; No murmur, rubs, gallops.   LUNGS: CTAB. nonlabored breathing on room air  ABDOMEN: Soft, nontender to palpation, Nondistended. No HSM  EXT:  No cyanosis, clubbing or edema. No cord.  :  Without Menon catheter.   MSK:  FROM, no joint effusions  SKIN: no new rashes noted  NEURO: Oriented to PPT. Normal speech and cognition.  PSYCHIATRIC: Normal insight and judgment. Cooperative with PE    Laboratory Data    Results from last 7 days   Lab Units 21  0335 21  0711 07/10/21  0842   WBC 10*3/mm3 16.24* 13.33* 15.93*   HEMOGLOBIN g/dL 10.1* 9.7* 10.6*   HEMATOCRIT % 31.7* 30.8* 32.9*   PLATELETS 10*3/mm3 382 359 341     Results from last 7 days   Lab  Units 07/12/21  0335   SODIUM mmol/L 139   POTASSIUM mmol/L 3.9   CHLORIDE mmol/L 105   CO2 mmol/L 23.0   BUN mg/dL 8   CREATININE mg/dL 0.78   GLUCOSE mg/dL 87   CALCIUM mg/dL 9.2     Results from last 7 days   Lab Units 07/07/21  0612   ALK PHOS U/L 69   BILIRUBIN mg/dL 0.3   ALT (SGPT) U/L 6   AST (SGOT) U/L 11         Results from last 7 days   Lab Units 07/06/21  0713   CRP mg/dL 34.97*         Results from last 7 days   Lab Units 07/11/21  0711   CK TOTAL U/L 41     Results from last 7 days   Lab Units 07/05/21  1230   VANCOMYCIN TR mcg/mL <4.00*     Estimated Creatinine Clearance: 127.3 mL/min (by C-G formula based on SCr of 0.78 mg/dL).      Microbiology:  Microbiology Results (last 10 days)     Procedure Component Value - Date/Time    Anaerobic Culture - Vaginal Fluid, Vagina [[id number]] Collected: 07/09/21 1523    Lab Status: Preliminary result Specimen: Vaginal Fluid Updated: 07/12/21 0932     Anaerobic Culture No anaerobes isolated at 3 days    Genital Culture - Vaginal Fluid, Vagina [[id number]]  (Abnormal) Collected: 07/09/21 1523    Lab Status: Final result Specimen: Vaginal Fluid Updated: 07/12/21 0952     Genital Culture Light growth (2+) Gardnerella vaginalis     Comment: Beta lactamase negative.        Gram Stain Rare (1+) WBCs seen      Rare (1+) Gram negative bacilli    MRSA Screen, PCR (Inpatient) - Swab, Nares [[id number]]  (Normal) Collected: 07/05/21 1828    Lab Status: Final result Specimen: Swab from Nares Updated: 07/05/21 2036     MRSA PCR Negative    Narrative:      MRSA Negative    COVID PRE-OP / PRE-PROCEDURE SCREENING ORDER (NO ISOLATION) - Swab, Nasopharynx [[id number]]  (Normal) Collected: 07/04/21 0302    Lab Status: Final result Specimen: Swab from Nasopharynx Updated: 07/04/21 0408    Narrative:      The following orders were created for panel order COVID PRE-OP / PRE-PROCEDURE SCREENING ORDER (NO ISOLATION) - Swab, Nasopharynx.  Procedure                               Abnormality          Status                     ---------                               -----------         ------                     COVID-19,CEPHEID,JORGE IN-...[[id number]]  Normal              Final result                 Please view results for these tests on the individual orders.    COVID-19,CEPHEID,JORGE IN-HOUSE(OR EMERGENT/ADD-ON),NP SWAB IN TRANSPORT MEDIA 3-4 HR TAT - Swab, Nasopharynx [[id number]]  (Normal) Collected: 07/04/21 0302    Lab Status: Final result Specimen: Swab from Nasopharynx Updated: 07/04/21 0408     COVID19 Not Detected    Narrative:      Fact sheet for providers: https://www.fda.gov/media/739173/download     Fact sheet for patients: https://www.fda.gov/media/117912/download  Fact sheet for providers: https://www.fda.gov/media/944546/download     Fact sheet for patients: https://www.fda.gov/media/022338/download    Blood Culture - Blood, Arm, Left [[id number]] Collected: 07/04/21 0017    Lab Status: Final result Specimen: Blood from Arm, Left Updated: 07/09/21 0445     Blood Culture No growth at 5 days    Blood Culture - Blood, Arm, Right [[id number]] Collected: 07/04/21 0017    Lab Status: Final result Specimen: Blood from Arm, Right Updated: 07/09/21 0445     Blood Culture No growth at 5 days              Radiology:  Imaging Results (Last 72 Hours)     Procedure Component Value Units Date/Time    XR Abdomen KUB [[id number]] Collected: 07/11/21 1414     Updated: 07/11/21 2238    Narrative:      EXAMINATION: XR ABDOMEN KUB - 07/11/2021     INDICATION:  T81.43XA-Infection following a procedure, organ and space  surgical site, initial encounter; R10.30-Lower abdominal pain,  unspecified; Z90.710-Acquired absence of both cervix and uterus.  Abdominal pain. Distention.     COMPARISON: None.     FINDINGS: Gas is seen in normal caliber large and small bowel. Stomach  appears normally distended with air. No bowel wall edema, pneumatosis or  pathologic intra-abdominal mass effect is seen. A couple of  tiny  phleboliths are noted in the pelvis.       Impression:      Nonobstructive bowel gas pattern.      DICTATED:   07/11/2021  EDITED/ls :   07/11/2021     This report was finalized on 7/11/2021 10:35 PM by Dr. Froy Xavier MD.               Impression:   - Sepsis with fever, tachycardia, leukocytosis, and post-operative pelvic abscesses- improved  - Multiple pelvic abscess after prior hysterectomy 5/27/21.  S/p surgery 7/9/21  - Leukocytosis/neutrophilia, worse  - Pelvic pain- improved  - Fever, improved  - Abnormal uterine bleeding/fibroid tumors s/p hysterectomy (vaginal-assisted) with ovaries retained  -hypokalemia- ongoing but improved  - Bacterial vaginosis    PLAN/RECOMMENDATIONS:   Thank you for asking us to see Anisa Sosa, I recommend the following:  - Follow blood cultures. -no growth  - Continue Zosyn for empiric IA coverage  - Continue Daptomycin 6 mg/kg IV daily to empirically cover MRSA and VRE.   - s/p surgery 7/9- vaginal culture with  Gardnerella vaginalis.  - PICC placement    I am ok with the patient's discharge today. Note that her WBC is worse today but she clinically looks very well and we will closely monitor on an outpatient basis with repeat imaging next week. I am discussed INPAT abx through PIV vs. PICC and home antibiotics. The patient does not think that she would have transportation to infusion center daily and she would rather infuse at home so will plan for her to infuse at home through PICC line. Her  PICC needs to be placed prior to discharge. She can infuse OPAT through [facility]:    UM/MIHIR:  1. Daptomycin 500 mg IV q24h  2. Zosyn 4.5 g IV q8h  3. Plan to continue the above antibiotics OPAT through [facility] at least through repeat CT AP on 7/21/21.   4. Polan Lisa Ye at [facility]: Please schedule the patient for repeat CT AP with IV contrast 7/21/21 at [facility] prior to her appointment  5. Weekly cbc with diff, cmp, esr, crp, and cpk level  6. Change the PICC line dressing weekly with a  "Biopatch or Tegaderm CHG gel dressing  7. Follow up in my clinic 21. Clinic to call with appointment details  8. Please fax a copy of my orders to [facility] at [phone] and call [phone] with final discharge plans    I discussed with Dr. Houston today      Titus Frias MD  2021  12:24 EDT                    Electronically signed by Titus Frias MD at 21 1248     Genia Rousseau DO at 21 1016              [facility] Medicine Services  PROGRESS NOTE    Patient Name: Anisa Sosa  : 1990  MRN: [id number]    Date of Admission: 7/3/2021  Primary Care Physician: Provider, No Known    Subjective   Subjective     CC:  F/u intra-abdominal abscess    HPI:  Patient resting in bed. Complaining of more pain over the right side of her abdomen, does not feel like the current pain regimen is helping much. Denies nausea/vomting. Having bowel movements, thinks maybe she \"overdid it yesterday\".    ROS:  Gen- No fevers, chills  CV- No chest pain, palpitations  Resp- No cough, dyspnea  GI- No N/V/D, + abd pain    Objective   Objective     Vital Signs:   Temp:  [98.1 °F (36.7 °C)-98.9 °F (37.2 °C)] 98.5 °F (36.9 °C)  Heart Rate:  [65-85] 73  Resp:  [14-16] 16  BP: (120-130)/(85-92) 129/86        Physical Exam:  Constitutional: No acute distress, awake, alert  HENT: NCAT, mucous membranes moist  Respiratory: Clear to auscultation bilaterally, respiratory effort normal   Cardiovascular: RRR, no murmurs, rubs, or gallops  Gastrointestinal: Positive bowel sounds, soft, significantly tender to palpation in RLQ  Musculoskeletal: No bilateral ankle edema  Psychiatric: Appropriate affect, cooperative  Neurologic: Oriented x 3, strength symmetric in all extremities, Cranial Nerves grossly intact to confrontation, speech clear  Skin: No rashes      Results Reviewed:  Results from last 7 days   Lab Units 21  0335 21  0711 07/10/21  0842   WBC 10*3/mm3 16.24* " 13.33* 15.93*   HEMOGLOBIN g/dL 10.1* 9.7* 10.6*   HEMATOCRIT % 31.7* 30.8* 32.9*   PLATELETS 10*3/mm3 382 359 341     Results from last 7 days   Lab Units 07/12/21  0335 07/11/21  0711 07/10/21  0842 07/07/21  2335 07/07/21  0612 07/06/21  0713   SODIUM mmol/L 139 136 139   < > 135* 137   POTASSIUM mmol/L 3.9 4.1 4.3  --  3.3* 2.6*   CHLORIDE mmol/L 105 106 107   < > 98 98   CO2 mmol/L 23.0 22.0 25.0   < > 29.0 31.0*   BUN mg/dL 8 4* 6   < > 3* 2*   CREATININE mg/dL 0.78 0.65 0.63   < > 0.63 0.62   GLUCOSE mg/dL 87 93 122*   < > 121* 98   CALCIUM mg/dL 9.2 8.8 9.3   < > 8.6 8.7   ALT (SGPT) U/L  --   --   --   --  6 6   AST (SGOT) U/L  --   --   --   --  11 13    < > = values in this interval not displayed.     Estimated Creatinine Clearance: 127.3 mL/min (by C-G formula based on SCr of 0.78 mg/dL).    Microbiology Results Abnormal     Procedure Component Value - Date/Time    Genital Culture - Vaginal Fluid, Vagina [[id number]]  (Abnormal) Collected: 07/09/21 1523    Lab Status: Final result Specimen: Vaginal Fluid Updated: 07/12/21 0952     Genital Culture Light growth (2+) Gardnerella vaginalis     Comment: Beta lactamase negative.        Gram Stain Rare (1+) WBCs seen      Rare (1+) Gram negative bacilli    Anaerobic Culture - Vaginal Fluid, Vagina [[id number]] Collected: 07/09/21 1523    Lab Status: Preliminary result Specimen: Vaginal Fluid Updated: 07/12/21 0932     Anaerobic Culture No anaerobes isolated at 3 days    Blood Culture - Blood, Arm, Right [[id number]] Collected: 07/04/21 0017    Lab Status: Final result Specimen: Blood from Arm, Right Updated: 07/09/21 0445     Blood Culture No growth at 5 days    Blood Culture - Blood, Arm, Left [[id number]] Collected: 07/04/21 0017    Lab Status: Final result Specimen: Blood from Arm, Left Updated: 07/09/21 0445     Blood Culture No growth at 5 days    MRSA Screen, PCR (Inpatient) - Swab, Nares [[id number]]  (Normal) Collected: 07/05/21 1828    Lab Status: Final  result Specimen: Swab from Nares Updated: 07/05/21 2036     MRSA PCR Negative    Narrative:      MRSA Negative    COVID PRE-OP / PRE-PROCEDURE SCREENING ORDER (NO ISOLATION) - Swab, Nasopharynx [[id number]]  (Normal) Collected: 07/04/21 0302    Lab Status: Final result Specimen: Swab from Nasopharynx Updated: 07/04/21 0408    Narrative:      The following orders were created for panel order COVID PRE-OP / PRE-PROCEDURE SCREENING ORDER (NO ISOLATION) - Swab, Nasopharynx.  Procedure                               Abnormality         Status                     ---------                               -----------         ------                     COVID-19,CEPHEID,JORGE IN-...[[id number]]  Normal              Final result                 Please view results for these tests on the individual orders.    COVID-19,CEPHEID,JORGE IN-HOUSE(OR EMERGENT/ADD-ON),NP SWAB IN TRANSPORT MEDIA 3-4 HR TAT - Swab, Nasopharynx [[id number]]  (Normal) Collected: 07/04/21 0302    Lab Status: Final result Specimen: Swab from Nasopharynx Updated: 07/04/21 0408     COVID19 Not Detected    Narrative:      Fact sheet for providers: https://www.fda.gov/media/781635/download     Fact sheet for patients: https://www.fda.gov/media/034016/download  Fact sheet for providers: https://www.fda.gov/media/107958/download     Fact sheet for patients: https://www.fda.gov/media/623439/download          Imaging Results (Last 24 Hours)     Procedure Component Value Units Date/Time    XR Abdomen KUB [[id number]] Collected: 07/11/21 1414     Updated: 07/11/21 2238    Narrative:      EXAMINATION: XR ABDOMEN KUB - 07/11/2021     INDICATION:  T81.43XA-Infection following a procedure, organ and space  surgical site, initial encounter; R10.30-Lower abdominal pain,  unspecified; Z90.710-Acquired absence of both cervix and uterus.  Abdominal pain. Distention.     COMPARISON: None.     FINDINGS: Gas is seen in normal caliber large and small bowel. Stomach  appears normally  distended with air. No bowel wall edema, pneumatosis or  pathologic intra-abdominal mass effect is seen. A couple of tiny  phleboliths are noted in the pelvis.       Impression:      Nonobstructive bowel gas pattern.      DICTATED:   07/11/2021  EDITED/ls :   07/11/2021     This report was finalized on 7/11/2021 10:35 PM by Dr. Froy Xavier MD.                 I have reviewed the medications:  Scheduled Meds:DAPTOmycin, 6 mg/kg (Adjusted), Intravenous, Q24H  docusate sodium, 100 mg, Oral, BID  enoxaparin, 40 mg, Subcutaneous, Q24H  piperacillin-tazobactam, 4.5 g, Intravenous, Q8H  polyethylene glycol, 17 g, Oral, Daily  Sodium Chloride (PF), 10 mL, Intravenous, Q12H      Continuous Infusions:   PRN Meds:.•  acetaminophen  •  bisacodyl  •  HYDROcodone-acetaminophen  •  magnesium sulfate **OR** magnesium sulfate **OR** magnesium sulfate  •  ondansetron  •  ondansetron  •  [MAR Hold] phenol  •  potassium chloride **OR** potassium chloride **OR** potassium chloride    Assessment/Plan   Assessment & Plan     Active Hospital Problems    Diagnosis  POA   • Intra-abdominal abscess post-procedure [T81.43XA]  Yes   • Lower abdominal pain [R10.30]  Unknown   • Status post hysterectomy [Z90.710]  Unknown      Resolved Hospital Problems   No resolved problems to display.        Brief Hospital Course to date:  Anisa Sosa is a 31 y.o. female with history of elective laparoscopic hysterectomy in [place] on May 27.  She recently reports increased abdominal pain and anorexia as well as no fevers.  CT abdomen pelvis here showed extensive peritoneal inflammation throughout the pelvis with multiple small peripherally enhancing interloop fluid collections scattered in the low pelvis with the largest measuring 5.7 cm in the perirectal area.    Sepsis - POA   Multifocal Pelvic Abscesses s/p [place] 5/27/21   - s/p diagnostic laparoscopy 7/9 by aMrgaret Robertson and Remington. Noted multiple adhesions s/p peritoneal lavage with 1L saline with  polymycin.  Vaginal cuff with thick, white drainage - Cx positive for gardnerella   - ID following, continue Dapto/Zosyn, awaiting final abx plan pending cultures  - increase PRN pain medication to Norco 10/325 q4 hrs PRN  -  Having bowel movements, continue to encourage ambulation     DVT prophylaxis:  Medical and mechanical DVT prophylaxis orders are present.       Disposition: I expect the patient to be discharged when final abx plan in place    CODE STATUS:   Code Status and Medical Interventions:   Ordered at: 21 0418     Level Of Support Discussed With:    Patient     Code Status:    CPR     Medical Interventions (Level of Support Prior to Arrest):    Full       Genia Rousseau DO  21                Electronically signed by Genia Rousseau DO at 21 1021     Angelo Houston MD at 21 0940          2021    Name:Anisa Sosa   MR#:[id number]      GYN Progress Note       HD:7    Subjective   31 y.o.yo  POD#2 from Dx LPS in the context of intraabdominal post operative infection after outside TLH  She feels better.   Tolerating a regular diet  Voiding well  Passing Flatus No BM today  She has ambulated very little today    Patient Active Problem List   Diagnosis   • Intra-abdominal abscess post-procedure   • Lower abdominal pain   • Status post hysterectomy       Objective     Vital Signs Range for the last 24 hours  Temp: Temp:  [97.8 °F (36.6 °C)-98.2 °F (36.8 °C)] 97.9 °F (36.6 °C) Temp src: Oral  BP: BP: (107-132)/(74-96) 116/84   BP Readings from Last 3 Encounters:   21 116/84     Pulse: Heart Rate:  [59-81] 65   Pulse Readings from Last 3 Encounters:   21 65     Resp:  Resp:  [15-18] 16    I/O last 3 completed shifts:  In: 200 [IV Piggyback:200]  Out: 1200 [Urine:1200]  No intake/output data recorded.      Results from last 7 days   Lab Units 21  0711 07/10/21  0842 21  0552 21  0756   WBC 10*3/mm3 13.33* 15.93* 12.69* 10.08    HEMOGLOBIN g/dL 9.7* 10.6* 9.7* 11.0*   HEMATOCRIT % 30.8* 32.9* 29.9* 33.5*   PLATELETS 10*3/mm3 359 341 256 170   MONOCYTES % %  --   --   --  3.0*     Results from last 7 days   Lab Units 21  0711 21  2335 21  0612   SODIUM mmol/L 136   < > 135*   POTASSIUM mmol/L 4.1  --  3.3*   CHLORIDE mmol/L 106   < > 98   CO2 mmol/L 22.0   < > 29.0   BUN mg/dL 4*   < > 3*   CREATININE mg/dL 0.65   < > 0.63   CALCIUM mg/dL 8.8   < > 8.6   BILIRUBIN mg/dL  --   --  0.3   ALK PHOS U/L  --   --  69   ALT (SGPT) U/L  --   --  6   AST (SGOT) U/L  --   --  11   GLUCOSE mg/dL 93   < > 121*    < > = values in this interval not displayed.     Results from last 7 days   Lab Units 21  0711   SODIUM mmol/L 136   POTASSIUM mmol/L 4.1   CHLORIDE mmol/L 106   CO2 mmol/L 22.0   BUN mg/dL 4*   CREATININE mg/dL 0.65   GLUCOSE mg/dL 93   CALCIUM mg/dL 8.8         Physical Exam:  General Appearance:  awake, alert, oriented, in no acute distress  Head/face:  NCAT  Lungs:  Normal expansion.  Clear to auscultation.  No rales, rhonchi, or wheezing.  Heart:  Heart sounds are normal.  Regular rate and rhythm without murmur, gallop or rub.  Abdomen:  Skin: clean, dry, intact ecchymosis- at port sites  Auscultation: not performed  Palpation: soft. Non-distended  Extremities: NTTP, no edema, SCDS bilaterally      Assessment/Plan   1.  Post op Day 1 DxLPS  2.  Severe Post operative infection after TLH at outside facility. Improving after DxLPS and IV antibitoics  I would anticipate her continuing on IV antibiotics another 48 hours and then assess for discharge. Will certainly follow ID rec        Angelo Houston MD  2021 09:40 EDT    Electronically signed by Angelo Houston MD at 21 0941     Maribel Varela MD at 21 0905              [facility] Medicine Services  PROGRESS NOTE    Patient Name: Anisa Sosa  : 1990  MRN: [id number]    Date of Admission:  7/3/2021  Primary Care Physician: Provider, No Known    Subjective   Subjective     CC:  Abdominal pain    HPI:   Pt complaining of being sore still and worsening abdominal distention despite passing flatus and ambulating all day yesterday. Still no BM.     ROS:  Gen- no fevers, chills  CV- No chest pain, palpitations  Resp- No cough, dyspnea  GI- + abd pain        Objective   Objective     Vital Signs:   Temp:  [97.8 °F (36.6 °C)-98.2 °F (36.8 °C)] 97.9 °F (36.6 °C)  Heart Rate:  [59-81] 65  Resp:  [15-18] 16  BP: (107-132)/(74-96) 116/84        Physical Exam:  Constitutional - no acute distress, nontoxic, in bed  HEENT-NCAT, mucous membranes moist  CV-RRR, S1 S2 normal, no m/r/g  Resp-CTAB, no wheezes, rhonchi or rales  Abd-soft, distended, tender to light palpation diffusely, hypoactive BS  Ext-No lower extremity cyanosis, clubbing or edema bilaterally  Neuro-alert and oriented x 3, speech clear, moves all extremities   Psych-normal affect   Skin- No rash on exposed UE or LE bilaterally    Results Reviewed:  Results from last 7 days   Lab Units 07/11/21  0711 07/10/21  0842 07/09/21  0552 07/05/21  0756   WBC 10*3/mm3 13.33* 15.93* 12.69* 10.08   HEMOGLOBIN g/dL 9.7* 10.6* 9.7* 11.0*   HEMATOCRIT % 30.8* 32.9* 29.9* 33.5*   PLATELETS 10*3/mm3 359 341 256 170   PROCALCITONIN ng/mL  --   --   --  0.21     Results from last 7 days   Lab Units 07/11/21  0711 07/10/21  0842 07/09/21  0552 07/07/21  2335 07/07/21  0612 07/06/21  0713   SODIUM mmol/L 136 139 139   < > 135* 137   POTASSIUM mmol/L 4.1 4.3 3.5  --  3.3* 2.6*   CHLORIDE mmol/L 106 107 103   < > 98 98   CO2 mmol/L 22.0 25.0 24.0   < > 29.0 31.0*   BUN mg/dL 4* 6 4*   < > 3* 2*   CREATININE mg/dL 0.65 0.63 0.58   < > 0.63 0.62   GLUCOSE mg/dL 93 122* 89   < > 121* 98   CALCIUM mg/dL 8.8 9.3 8.5*   < > 8.6 8.7   ALT (SGPT) U/L  --   --   --   --  6 6   AST (SGOT) U/L  --   --   --   --  11 13    < > = values in this interval not displayed.     Estimated  Creatinine Clearance: 156.9 mL/min (by C-G formula based on SCr of 0.65 mg/dL).    Microbiology Results Abnormal     Procedure Component Value - Date/Time    Genital Culture - Vaginal Fluid, Vagina [[id number]] Collected: 07/09/21 1523    Lab Status: Preliminary result Specimen: Vaginal Fluid Updated: 07/10/21 0817     Genital Culture Culture in progress     Gram Stain Rare (1+) WBCs seen      Rare (1+) Gram negative bacilli    Blood Culture - Blood, Arm, Right [[id number]] Collected: 07/04/21 0017    Lab Status: Final result Specimen: Blood from Arm, Right Updated: 07/09/21 0445     Blood Culture No growth at 5 days    Blood Culture - Blood, Arm, Left [[id number]] Collected: 07/04/21 0017    Lab Status: Final result Specimen: Blood from Arm, Left Updated: 07/09/21 0445     Blood Culture No growth at 5 days    MRSA Screen, PCR (Inpatient) - Swab, Nares [[id number]]  (Normal) Collected: 07/05/21 1828    Lab Status: Final result Specimen: Swab from Nares Updated: 07/05/21 2036     MRSA PCR Negative    Narrative:      MRSA Negative    COVID PRE-OP / PRE-PROCEDURE SCREENING ORDER (NO ISOLATION) - Swab, Nasopharynx [[id number]]  (Normal) Collected: 07/04/21 0302    Lab Status: Final result Specimen: Swab from Nasopharynx Updated: 07/04/21 0408    Narrative:      The following orders were created for panel order COVID PRE-OP / PRE-PROCEDURE SCREENING ORDER (NO ISOLATION) - Swab, Nasopharynx.  Procedure                               Abnormality         Status                     ---------                               -----------         ------                     COVID-19,CEPHEID,JORGE IN-...[[id number]]  Normal              Final result                 Please view results for these tests on the individual orders.    COVID-19,CEPHEID,JORGE IN-HOUSE(OR EMERGENT/ADD-ON),NP SWAB IN TRANSPORT MEDIA 3-4 HR TAT - Swab, Nasopharynx [[id number]]  (Normal) Collected: 07/04/21 0302    Lab Status: Final result Specimen: Swab from Nasopharynx  Updated: 07/04/21 0408     COVID19 Not Detected    Narrative:      Fact sheet for providers: https://www.fda.gov/media/375869/download     Fact sheet for patients: https://www.fda.gov/media/000028/download  Fact sheet for providers: https://www.fda.gov/media/055785/download     Fact sheet for patients: https://www.fda.gov/media/363775/download          Imaging Results (Last 24 Hours)     ** No results found for the last 24 hours. **              I have reviewed the medications:  Scheduled Meds:DAPTOmycin, 6 mg/kg (Adjusted), Intravenous, Q24H  docusate sodium, 100 mg, Oral, BID  enoxaparin, 40 mg, Subcutaneous, Q24H  piperacillin-tazobactam, 4.5 g, Intravenous, Q8H  polyethylene glycol, 17 g, Oral, Daily  Sodium Chloride (PF), 10 mL, Intravenous, Q12H      Continuous Infusions:lactated ringers, 9 mL/hr, Last Rate: 9 mL/hr (07/09/21 1225)  sodium chloride, 100 mL/hr, Last Rate: 100 mL/hr (07/10/21 0546)      PRN Meds:.•  acetaminophen  •  [MAR Hold] bisacodyl  •  HYDROcodone-acetaminophen  •  magnesium sulfate **OR** magnesium sulfate **OR** magnesium sulfate  •  ondansetron  •  ondansetron  •  [MAR Hold] phenol  •  potassium chloride **OR** potassium chloride **OR** potassium chloride    Assessment/Plan   Assessment & Plan     Active Hospital Problems    Diagnosis  POA   • Intra-abdominal abscess post-procedure [T81.43XA]  Yes   • Lower abdominal pain [R10.30]  Unknown   • Status post hysterectomy [Z90.710]  Unknown      Resolved Hospital Problems   No resolved problems to display.        Brief Hospital Course to date:  Anisa Sosa is a 31 y.o. female with history of elective laparoscopic hysterectomy in [place] on May 27.  She recently reports increased abdominal pain and anorexia as well as no fevers.  CT abdomen pelvis here showed extensive peritoneal inflammation throughout the pelvis with multiple small peripherally enhancing interloop fluid collections scattered in the low pelvis with the largest  measuring 5.7 cm in the perirectal area.    Fever  Leukocytosis  Intra-abdominal abscesses  - afebrile for last 24 hours  - Leukocytosis had resolved, then increased to 15K now improving again  -- stop IVFs as pt is tolerating PO diet  -Continue daptomycin/zosyn  -no discrete walled abscess amenable for drainage on 7/5. D/w Dr. Frias and Dr. Robertson, repeated CT scan 7/8 to see if there any collection of fluid amenable to drainage. Not enough fluid for drainage by IR.    --s/p diagnostic laparoscopy 7/9 by Margaret Robertson and Remington. Noted multiple adhesions s/p peritoneal lavage with 1L saline with polymycin.  Vaginal cuff with thick, white drainage (culture PENDING).   -Pain control with current regimen  --encouraged ambulation  -- check KUB given distention and increased discomfort on exam this am.  Hopefully symptoms will resolve with BM    DVT prophylaxis: lovenox    Disposition: I expect the patient to be discharged home tbd    CODE STATUS:   Code Status and Medical Interventions:   Ordered at: 07/04/21 1936     Level Of Support Discussed With:    Patient     Code Status:    CPR     Medical Interventions (Level of Support Prior to Arrest):    Full       Maribel Varela MD  07/11/21                Electronically signed by Maribel Varela MD at 07/11/21 [id number]

## 2021-07-12 NOTE — CONSULTS
4FR PICC placed by Sari Bryant RN [facility] tip verified by [facility]CG, 39cm with 1cm exposed placed into the right upper extremity.

## 2021-07-12 NOTE — DISCHARGE SUMMARY
[facility] Medicine Services  DISCHARGE SUMMARY    Patient Name: Anisa Sosa  : 1990  MRN: [id number]    Date of Admission: 7/3/2021 10:32 PM  Date of Discharge:  2021  Primary Care Physician: Provider, No Known    Consults     Date and Time Order Name Status Description    2021 12:22 PM Inpatient Gynecologic Oncology Consult Completed     2021  7:21 AM Inpatient Infectious Diseases Consult Completed     2021  4:23 AM Inpatient Obstetrics / Gynecology Consult Completed           Hospital Course     Presenting Problem:   Intra-abdominal abscess post-procedure [T81.43XA]    Active Hospital Problems    Diagnosis  POA   • Intra-abdominal abscess post-procedure [T81.43XA]  Yes   • Lower abdominal pain [R10.30]  Unknown   • Status post hysterectomy [Z90.710]  Unknown      Resolved Hospital Problems   No resolved problems to display.          Hospital Course:  Anisa Sosa is a 31 y.o. female with history of elective laparoscopic hysterectomy in [place] on May 27.  She recently reports increased abdominal pain and anorexia as well as no fevers.  CT abdomen pelvis here showed extensive peritoneal inflammation throughout the pelvis with multiple small peripherally enhancing interloop fluid collections scattered in the low pelvis with the largest measuring 5.7 cm in the perirectal area.    Sepsis - POA, improved  Multifocal Pelvic Abscesses s/p H 21   - s/p diagnostic laparoscopy  by Margaret Robertson and Remington. Noted multiple adhesions s/p peritoneal lavage with 1L saline with polymycin.  Vaginal cuff with thick, white drainage - Cx positive for gardnerella   - ID following, continue Dapto/Zosyn through at least , PICC placed today, patient planning to do home infusions, will f/u with Dr. Frias next week with repeat CT abd/pelvis.  - continue PRN pain medications at discharge  - Having bowel movements, continue to encourage ambulation    Discharge  "Follow Up Recommendations for outpatient labs/diagnostics:   - Dapto/Zosyn through 7/21 with plan for repeat CT abd/pelvis on 7/21 and f/u with Dr. Titus Frias at [facility]  - f/u with Dr. Robertson same day as f/u with Dr. Frias    Day of Discharge     HPI:   Patient resting in bed. Complained of increased pain today, but thinks it was related to \"overdoing it\" yesterday. Would like to go home. No fever/chills overnight.      Review of Systems  Gen- No fevers, chills  CV- No chest pain, palpitations  Resp- No cough, dyspnea  GI- No N/V/D, +abd pain      Vital Signs:   Temp:  [98.1 °F (36.7 °C)-98.9 °F (37.2 °C)] 98.5 °F (36.9 °C)  Heart Rate:  [68-85] 73  Resp:  [14-16] 16  BP: (117-130)/(80-92) 117/80     Physical Exam:  Constitutional: No acute distress, awake, alert  HENT: NCAT, mucous membranes moist  Respiratory: Clear to auscultation bilaterally, respiratory effort normal   Cardiovascular: RRR, no murmurs, rubs, or gallops  Gastrointestinal: Positive bowel sounds, soft, significantly tender to palpation in RLQ  Musculoskeletal: No bilateral ankle edema  Psychiatric: Appropriate affect, cooperative  Neurologic: Oriented x 3, strength symmetric in all extremities, Cranial Nerves grossly intact to confrontation, speech clear  Skin: No rashes    Pertinent  and/or Most Recent Results     LAB RESULTS:      Lab 07/12/21  0335 07/11/21  0711 07/10/21  0842 07/09/21  0552 07/08/21  0704 07/07/21  0612 07/06/21  0713   WBC 16.24* 13.33* 15.93* 12.69* 15.28* 11.14* 10.84*   HEMOGLOBIN 10.1* 9.7* 10.6* 9.7* 10.1* 9.8* 10.1*   HEMATOCRIT 31.7* 30.8* 32.9* 29.9* 31.4* 30.6* 31.1*   PLATELETS 382 359 341 256 233 184 158   NEUTROS ABS 12.77*  --   --  10.41*  --  9.34*  --    IMMATURE GRANS (ABS) 0.11*  --   --  0.11*  --  0.06*  --    LYMPHS ABS 2.28  --   --  1.35  --  1.01  --    MONOS ABS 0.93*  --   --  0.70  --  0.58  --    EOS ABS 0.12  --   --  0.09  --  0.11  --    MCV 96.9 95.1 95.1 95.2 95.4 95.9 94.8   CRP  --   " --   --   --   --   --  34.97*         Lab 07/12/21  0335 07/11/21  0711 07/10/21  0842 07/09/21  0552 07/08/21  0704 07/07/21  0612 07/06/21  0713   SODIUM 139 136 139 139 143 135* 137   POTASSIUM 3.9 4.1 4.3 3.5 3.8 3.3* 2.6*   CHLORIDE 105 106 107 103 106 98 98   CO2 23.0 22.0 25.0 24.0 27.0 29.0 31.0*   ANION GAP 11.0 8.0 7.0 12.0 10.0 8.0 8.0   BUN 8 4* 6 4* 4* 3* 2*   CREATININE 0.78 0.65 0.63 0.58 0.62 0.63 0.62   GLUCOSE 87 93 122* 89 84 121* 98   CALCIUM 9.2 8.8 9.3 8.5* 9.1 8.6 8.7   MAGNESIUM  --   --   --   --   --  2.4 1.6         Lab 07/07/21  0612 07/06/21  0713   TOTAL PROTEIN 5.6* 5.8*   ALBUMIN 2.60* 2.70*   GLOBULIN 3.0 3.1   ALT (SGPT) 6 6   AST (SGOT) 11 13   BILIRUBIN 0.3 0.4   ALK PHOS 69 76                 Lab 07/09/21  1233   ABO TYPING AB   RH TYPING Positive   ANTIBODY SCREEN Negative         Brief Urine Lab Results  (Last result in the past 365 days)      Color   Clarity   Blood   Leuk Est   Nitrite   Protein   CREAT   Urine HCG        07/04/21 1642 Yellow Cloudy Negative Negative Negative Negative             Microbiology Results (last 10 days)     Procedure Component Value - Date/Time    Anaerobic Culture - Vaginal Fluid, Vagina [[id number]] Collected: 07/09/21 1523    Lab Status: Preliminary result Specimen: Vaginal Fluid Updated: 07/12/21 0932     Anaerobic Culture No anaerobes isolated at 3 days    Genital Culture - Vaginal Fluid, Vagina [[id number]]  (Abnormal) Collected: 07/09/21 1523    Lab Status: Final result Specimen: Vaginal Fluid Updated: 07/12/21 0952     Genital Culture Light growth (2+) Gardnerella vaginalis     Comment: Beta lactamase negative.        Gram Stain Rare (1+) WBCs seen      Rare (1+) Gram negative bacilli    MRSA Screen, PCR (Inpatient) - Swab, Nares [[id number]]  (Normal) Collected: 07/05/21 1828    Lab Status: Final result Specimen: Swab from Nares Updated: 07/05/21 2036     MRSA PCR Negative    Narrative:      MRSA Negative    COVID PRE-OP / PRE-PROCEDURE  SCREENING ORDER (NO ISOLATION) - Swab, Nasopharynx [[id number]]  (Normal) Collected: 07/04/21 0302    Lab Status: Final result Specimen: Swab from Nasopharynx Updated: 07/04/21 0408    Narrative:      The following orders were created for panel order COVID PRE-OP / PRE-PROCEDURE SCREENING ORDER (NO ISOLATION) - Swab, Nasopharynx.  Procedure                               Abnormality         Status                     ---------                               -----------         ------                     COVID-19,CEPHEID,JORGE IN-...[[id number]]  Normal              Final result                 Please view results for these tests on the individual orders.    COVID-19,CEPHEID,JORGE IN-HOUSE(OR EMERGENT/ADD-ON),NP SWAB IN TRANSPORT MEDIA 3-4 HR TAT - Swab, Nasopharynx [[id number]]  (Normal) Collected: 07/04/21 0302    Lab Status: Final result Specimen: Swab from Nasopharynx Updated: 07/04/21 0408     COVID19 Not Detected    Narrative:      Fact sheet for providers: https://www.fda.gov/media/568741/download     Fact sheet for patients: https://www.fda.gov/media/369431/download  Fact sheet for providers: https://www.fda.gov/media/351794/download     Fact sheet for patients: https://www.fda.gov/media/423073/download    Blood Culture - Blood, Arm, Left [[id number]] Collected: 07/04/21 0017    Lab Status: Final result Specimen: Blood from Arm, Left Updated: 07/09/21 0445     Blood Culture No growth at 5 days    Blood Culture - Blood, Arm, Right [[id number]] Collected: 07/04/21 0017    Lab Status: Final result Specimen: Blood from Arm, Right Updated: 07/09/21 0445     Blood Culture No growth at 5 days          CT Abdomen Pelvis With Contrast    Result Date: 7/8/2021  EXAMINATION: CT ABDOMEN AND PELVIS W CONTRAST-  INDICATION: Abdominal abscess/infection suspected; T81.43XA-Infection following a procedure, organ and space surgical site, initial encounter; R10.30-Lower abdominal pain, unspecified; Z90.710-Acquired absence of both cervix  and uterus.  TECHNIQUE: 5 mm post IV contrast portal venous phase and delayed venous phase images through the abdomen and pelvis.  The radiation dose reduction device was turned on for each scan per the ALARA (As Low as Reasonably Achievable) protocol.  COMPARISON: Abdomen and pelvis CT scan 07/03/2021.  FINDINGS: Patient history indicates prior hysterectomy, the 07/03/2021 exam report indicated extensive peritoneal inflammation, multiple small peripherally enhancing interloop fluid collections concerning for abscess, largest 1 to 2 cm, with a pre-rectal collection just under 6 cm, subsequent aspirated under CT guidance.  Today's study shows this collection to appear very small, nonair-containing with no obvious enhancement, 3.6 x 1.5 cm in maximal dimensions. Left pelvic sidewall collection appears increased, from 3.1 x 2.6 m on 07/03/2021 to approximately 4.9 x 2.7 cm today. At the same axial level, a small central pelvic fluid and air collection, 16 mm in diameter has increased to 3.8 cm in diameter. There is a suggestion of several other fluid collections between bowel loops at approximately the same level in the central pelvis, but these are difficult to reliably distinguish from fluid-containing small bowel as is seen elsewhere. Of these, a central low-density area in the pelvis, image 68 series 2 is thought to represent a 26 mm collection previously 11 mm. Dorsally adjacent collection approximately 2.7 cm appears to be increased from 1.5 cm. Neither collection contains air. Inflammatory fat stranding of the pelvis is similar in extent to prior exam. No free fluid is seen. Area of the vaginal cuff remains somewhat thickened but stable. No hematoma is seen. Bladder is normally distended and normal in appearance.  Elsewhere, there is mild new right lower lobe discoid atelectasis and trace effusion and minimal left lower lobe discoid atelectasis. There is extensive fatty liver change. No hepatic lesions are  identified. No significant abnormalities are seen of the spleen, pancreas, gallbladder, adrenal glands, or kidneys. No upper abdominal free air, ascites, or inflammatory focus is seen. Upper and midabdominal small bowel loops appear normal.  Delayed venous phase images show good contrast opacification of normal caliber renal collecting systems, ureters and bladder with no evidence of obstructive uropathy. Bony structures appear grossly intact.      1. Very small residual pre-rectal fluid collection, markedly improved from 07/03/2021. 2. Interval enlargement of multiple other intrapelvic fluid collections as discussed above. No clearly new fluid collection or other new inflammatory focus is appreciated. 3. Mild new right basilar atelectasis, minimal left basilar atelectasis and minimal pleural effusions.  D:  07/08/2021 E:  07/08/2021  This report was finalized on 7/8/2021 9:48 PM by Dr. Froy Xavier MD.      CT Abdomen Pelvis With Contrast    Result Date: 7/3/2021  CT ABDOMEN AND PELVIS WITH CONTRAST, 7/3/2021 HISTORY: 31-year-old female one month postop hysterectomy presenting to the ED complaining of low abdomen pain and decreased bowel movements. TECHNIQUE: CT imaging of the abdomen and pelvis with IV contrast. Radiation dose reduction techniques included automated exposure control. Radiation audit for CT and nuclear cardiology exams in the last 12 months: 0. PELVIS FINDINGS: Postoperative changes of hysterectomy. Edema and soft tissue stranding is seen throughout the pelvic mesentery, and there are multiple small, scattered rim-enhancing interloop fluid collections within the low pelvis suspicious for multifocal abscess. The largest collection in the low prerectal midline measures about 5.7 cm (image 73). There is soft tissue thickening of the vaginal cuff, but there is no abscess or air collection in this location. There is no air within the urinary bladder. Rectum is unremarkable. 3.1 cm left ovary cyst. ABDOMEN  FINDINGS: Small bowel and colon are normal in caliber with no evidence of bowel obstruction or significant adynamic ileus. No fluid collections are seen within the abdominal peritoneal or retroperitoneal cavities. Both kidneys are normal in appearance with no evidence of upper urinary tract obstruction on the right or left. Normal renal parenchymal enhancement. No gallbladder distention or bile duct dilatation. Liver, pancreas and spleen appear normal. Lung base images show no active disease.     1.  Postop hysterectomy with extensive peritoneal inflammation throughout the pelvis. There are multiple small peripherally enhancing interloop fluid collections scattered within the low pelvis concerning for interloop abscesses. Most of these measure only 1 to 2 cm. Largest low midline prerectal collection measures up to 5.7 cm. 2.  Soft tissue thickening of the vaginal cuff but no obvious evidence of cuff dehiscence. There is no abscess or free air at the cuff. No air within the bladder. 3.  No evidence of upper urinary tract obstruction. Signer Name: William Cox MD  Signed: 7/3/2021 11:52 PM  Workstation Name: [email or web address]  [facility]    CT Guided Biopsy Aspiration Injection    Result Date: 7/5/2021  EXAMINATION: CT GUIDED ASPIRATION-perirectal collection.  INDICATION: History of hysterectomy approximately one month prior to presentation with recent fevers and abdominal pain found to have multifocal fluid collections within the lower abdomen and pelvis suspicious for abscesses. Presents to interventional radiology for CT-guided transgluteal perirectal drain placement.  TECHNIQUE: After informed written consent, the patient was placed prone on the CT table.  CT revealed an approximately 5.5 x 2.8 cm collection in the pouch of Drew. This was amenable to drainage. The patient was prepped and draped in the normal sterile fashion. 1% lidocaine was infiltrated for local anesthesia.  Under CT fluoroscopy scopic guidance, an 18-gauge Chiba needle was advanced into the collection. Discussion was found to be quite mobile and the needle was unable to be advanced into the lesion proper. After 2 attempts, I decided to abort the procedure. The patient tolerated the procedure well.  The radiation dose reduction device was turned on for each scan per the ALARA (As Low as Reasonably Achievable) protocol.  COMPARISON: CT abdomen and pelvis from 7/3/2021  FINDINGS: Mobile perirectal collection possibly representing an early abscess. Unable to be punctured due to mobility. Procedure was aborted.       Mobile perirectal collection similar to that seen on prior imaging. Due to mobility, procedure was aborted. Recommend follow-up imaging in 3 days to evaluate for possible reattempt.   This report was finalized on 7/5/2021 12:27 PM by Catalina Bowman.      XR Abdomen KUB    Result Date: 7/11/2021  EXAMINATION: XR ABDOMEN KUB - 07/11/2021  INDICATION:  T81.43XA-Infection following a procedure, organ and space surgical site, initial encounter; R10.30-Lower abdominal pain, unspecified; Z90.710-Acquired absence of both cervix and uterus. Abdominal pain. Distention.  COMPARISON: None.  FINDINGS: Gas is seen in normal caliber large and small bowel. Stomach appears normally distended with air. No bowel wall edema, pneumatosis or pathologic intra-abdominal mass effect is seen. A couple of tiny phleboliths are noted in the pelvis.      Nonobstructive bowel gas pattern.   DICTATED:   07/11/2021 EDITED/ls :   07/11/2021  This report was finalized on 7/11/2021 10:35 PM by Dr. Froy Xavier MD.                    Plan for Follow-up of Pending Labs/Results:   Pending Labs     Order Current Status    Anaerobic Culture - Vaginal Fluid, Vagina Preliminary result        Discharge Details        Discharge Medications      New Medications      Instructions Start Date   DAPTOmycin 450 mg in sodium chloride 0.9 % 50 mL   6 mg/kg (450 mg),  Intravenous, Every 24 Hours      docusate sodium 100 MG capsule   100 mg, Oral, 2 Times Daily      HYDROcodone-acetaminophen 5-325 MG per tablet  Commonly known as: NORCO   1-2 tablets, Oral, Every 6 Hours PRN      ondansetron 4 MG tablet  Commonly known as: ZOFRAN   4 mg, Oral, Every 6 Hours PRN      piperacillin-tazobactam 4-0.5 GM/100ML solution IVPB  Commonly known as: ZOSYN   4.5 g, Intravenous, Every 8 Hours             No Known Allergies      Discharge Disposition:  Home or Self Care    Diet:  Hospital:  Diet Order   Procedures   • Diet Regular       Activity:  - as tolerated    Restrictions or Other Recommendations:  - none       CODE STATUS:    Code Status and Medical Interventions:   Ordered at: 07/04/21 0418     Level Of Support Discussed With:    Patient     Code Status:    CPR     Medical Interventions (Level of Support Prior to Arrest):    Full       Future Appointments   Date Time Provider Department Center   7/23/2021 10:00 AM [facility] CT 1 [facility] Antony Rousseau DO  07/12/21      Time Spent on Discharge:  I spent  40 minutes on this discharge activity which included: face-to-face encounter with the patient, reviewing the data in the system, coordination of the care with the nursing staff as well as consultants, documentation, and entering orders.

## 2021-07-12 NOTE — PROGRESS NOTES
INFECTIOUS DISEASE CONSULT/INITIAL HOSPITAL VISIT    Anisa Sosa  1990  [id number]    Date of Consult: 7/5/21  Admission Date: 7/3/2021      Requesting Provider: Walter Prieto MD  Evaluating Physician: Titus Frias MD    Reason for Consultation: intraabdominal abscesses after hysterectomy    History of present illness:    Patient is a 31 y.o. female with h/o STIs and abnormal uterine bleeding/uterine fibroids s/p lap vaginal-assisted hysterectomy in [place] on 5/27/21 (with ovaries retained) who followed up a week later and was placed on 5 days of Cipro for possible UTI. She did relatively well post-operatively with mild tenderness.  Last Sunday, she went to a tournament in [place] to watch her daughter and started feeling fatigued.  She slept for a couple of days, but on Tuesday, she began having severe abdominal pain in lower part of abdomen.  She has some dizziness and lightheadedness with the pain.  She went to [facility] ED on 6/30 with a CT scan at that time showing some inflammation of the pelvic area.  She was sent home on ibuprofen, pain meds, and Cipro.  She continued to have pain and pelvic pressure with no relief from medications or heating pad.  She had some difficulty breathing because of the bloating and pressure.  She had not had a BM since 6/29. She started having nausea and vomiting on 7/1.  On 7/2, she had worsening pain and came to [facility] ED on 7/3.  A CT scan here showed interloop abscesses 1 to 2 cm in sizes and a 5.7 cm abscess near the rectum.  A CT-guided aspiration of prerectal abscess was attempted today but aborted d/t the fluid collection being mobile.  Her Tmax since arrival has been 102.5.  Pertinent labs are PCT 0.3, lactic acid 0.8, WBC 14,400 with 90% neutrophils, creatinine 0.72, CRP 31.7, and UA WBC 3-5. Blood cultures are negative to date. A COVID-19 PCR was negative. She is currently on Vancomycin and Zosyn.  ID was asked to evaluate and  "manage her antibiotic therapy.       Subjective:    21: The patient did have some chills last night but fevers seem to be better today.  Tmax overnight was 100.9°F.  She did have some hypokalemia to a potassium of 2.6.  Pain is well controlled but still occasionally having some left lower quadrant and central abdominal pain. Only some mild nausea that is controlled with Zofran.  No bowel movement yet.       21: Still having some lower abdominal pain but no worse.  Having nausea that is well controlled with Zofran.  Fevers have improved today with Tmax 99.7°F.  She is tolerating the antibiotics well without any adverse side effects.    21: the patient is doing ok today. Abdominal pain is no worse. Tolerating diet. No n/v or diarrhea. No fevers. CT A/P with some improvement in perirectal fluid collection but some enlargement of other fluid collections. Plan is for surgery tomorrow.     7/10/21: the patient was taken to OR yesterday and intraoperative findings of \"colon and small bowel adhesions to anterior abdominal wall with inability to visualize the lower abdomen and pelvis. Vaginal cuff was intact but indurated with a small amount of spontaneous drainage that was sent for culture.\" No fevers. Abdominal pain is better today per the patient. No n/v.     21: the patient is doing better. Abdominal pain improved. No fevers. Tolerating abx well without ADRs. No n/v, diarrhea, or new rash. Vaginal cx with Gardnerella vaginalis.    Past Medical History:   Diagnosis Date   • Anemia    • History of chlamydia    • History of trichomonal vaginitis        Past Surgical History:   Procedure Laterality Date   •  SECTION     •  SECTION     •  SECTION WITH TUBAL  2013   • LAPAROSCOPIC ASSISTED VAGINAL HYSTERECTOMY  2021    for AUB-L, pelvic pain. Ovaries retained per patient. Dr. Avani Horta.        Family History   Problem Relation Age of Onset   • Breast cancer " Maternal Grandmother    • Ovarian cancer Neg Hx    • Colon cancer Neg Hx        Social History     Socioeconomic History   • Marital status: Single     Spouse name: Not on file   • Number of children: Not on file   • Years of education: Not on file   • Highest education level: Not on file   Tobacco Use   • Smoking status: Former Smoker   • Smokeless tobacco: Never Used   • Tobacco comment: off and on for 3 years    Vaping Use   • Vaping Use: Never used   Substance and Sexual Activity   • Alcohol use: Yes     Comment: occ   • Drug use: Never   • Sexual activity: Defer       No Known Allergies      Medication:    Current Facility-Administered Medications:   •  acetaminophen (TYLENOL) tablet 650 mg, 650 mg, Oral, Q6H PRN, Maribel Varela MD, 650 mg at 07/12/21 0723  •  bisacodyl (DULCOLAX) suppository 10 mg, 10 mg, Rectal, Daily PRN, Maribel Varela MD, 10 mg at 07/11/21 1249  •  DAPTOmycin (CUBICIN) 450 mg in sodium chloride 0.9 % 50 mL IVPB, 6 mg/kg (Adjusted), Intravenous, Q24H, Girish Martin PA, Last Rate: 100 mL/hr at 07/11/21 1742, 450 mg at 07/11/21 1742  •  docusate sodium (COLACE) capsule 100 mg, 100 mg, Oral, BID, Maribel Varela MD, 100 mg at 07/12/21 0807  •  enoxaparin (LOVENOX) syringe 40 mg, 40 mg, Subcutaneous, Q24H, Maribel Varela MD, 40 mg at 07/12/21 0807  •  HYDROcodone-acetaminophen (NORCO)  MG per tablet 1 tablet, 1 tablet, Oral, Q6H PRN, Genia Rousseau DO, 1 tablet at 07/12/21 0945  •  Magnesium Sulfate 2 gram Bolus, followed by 8 gram infusion (total Mg dose 10 grams)- Mg less than or equal to 1mg/dL, 2 g, Intravenous, PRN **OR** Magnesium Sulfate 2 gram / 50mL Infusion (GIVE X 3 BAGS TO EQUAL 6GM TOTAL DOSE) - Mg 1.1 - 1.5 mg/dl, 2 g, Intravenous, PRN **OR** Magnesium Sulfate 4 gram infusion- Mg 1.6-1.9 mg/dL, 4 g, Intravenous, PRN, Maribel Varela MD  •  ondansetron (ZOFRAN) injection 4 mg, 4 mg, Intravenous, Q6H PRN, Maribel Varela MD  •   ondansetron (ZOFRAN) tablet 4 mg, 4 mg, Oral, Q6H PRN, Maribel Varela MD  •  [MAR Hold] phenol (CHLORASEPTIC) 1.4 % liquid 2 spray, 2 spray, Mouth/Throat, Q2H PRN, Corin Brannon APRN  •  piperacillin-tazobactam (ZOSYN) 4.5 g in iso-osmotic dextrose 100 mL IVPB (premix), 4.5 g, Intravenous, Q8H, Titus Frias MD, 4.5 g at 07/12/21 1051  •  polyethylene glycol (MIRALAX) packet 17 g, 17 g, Oral, Daily, Maribel Varela MD, 17 g at 07/12/21 0807  •  potassium chloride (MICRO-K) CR capsule 40 mEq, 40 mEq, Oral, PRN, 40 mEq at 07/10/21 0143 **OR** potassium chloride (KLOR-CON) packet 40 mEq, 40 mEq, Oral, PRN **OR** potassium chloride 10 mEq in 100 mL IVPB, 10 mEq, Intravenous, Q1H PRN, Walter Prieto MD  •  Sodium Chloride (PF) 0.9 % 10 mL, 10 mL, Intravenous, Q12H, Maribel Varela MD, 10 mL at 07/11/21 2053    Antibiotics:  Anti-Infectives (From admission, onward)    Ordered     Dose/Rate Route Frequency Start Stop    07/05/21 1631  DAPTOmycin (CUBICIN) 450 mg in sodium chloride 0.9 % 50 mL IVPB     Neri Prater, [facility] reviewed the order on 07/06/21 0722.   Ordering Provider: Girish Martin PA    6 mg/kg × 75.6 kg (Adjusted)  100 mL/hr over 30 Minutes Intravenous Every 24 Hours 07/05/21 1800 07/19/21 1759    07/04/21 0423  piperacillin-tazobactam (ZOSYN) 4.5 g in iso-osmotic dextrose 100 mL IVPB (premix)     Titus Frias MD reviewed the order on 07/07/21 0851.   Ordering Provider: Titus Frias MD    4.5 g  over 4 Hours Intravenous Every 8 Hours 07/04/21 1200 07/18/21 1159    07/04/21 0423  piperacillin-tazobactam (ZOSYN) 4.5 g in iso-osmotic dextrose 100 mL IVPB (premix)     Ordering Provider: Tierney Martinez MD    4.5 g  over 30 Minutes Intravenous Once 07/04/21 0600 07/04/21 0528    07/03/21 3998  vancomycin 1750 mg/500 mL 0.9% NS IVPB ([facility])     Ordering Provider: Lito Machuca DO    20 mg/kg × 83.9 kg  over 120 Minutes Intravenous Once 07/03/21 0100  21 0250    21 2355  piperacillin-tazobactam (ZOSYN) 3.375 g in iso-osmotic dextrose 50 ml (premix)     Ordering Provider: Lito Machuca DO    3.375 g  over 30 Minutes Intravenous Once 21 2357 21 0140            Review of Systems:  As above    Physical Exam:   Vital Signs  Temp (24hrs), Av.5 °F (36.9 °C), Min:98.1 °F (36.7 °C), Max:98.9 °F (37.2 °C)    Temp  Min: 98.1 °F (36.7 °C)  Max: 98.9 °F (37.2 °C)  BP  Min: 117/80  Max: 130/92  Pulse  Min: 68  Max: 85  Resp  Min: 14  Max: 16  SpO2  Min: 96 %  Max: 97 %    GENERAL: Awake and alert, in no acute distress. Sitting in bedside chair  HEENT: Normocephalic, atraumatic.  No labial ulcers noted  HEART: RRR; No murmur, rubs, gallops.   LUNGS: CTAB. nonlabored breathing on room air  ABDOMEN: Soft, nontender to palpation, Nondistended. No HSM  EXT:  No cyanosis, clubbing or edema. No cord.  :  Without Menon catheter.   MSK:  FROM, no joint effusions  SKIN: no new rashes noted  NEURO: Oriented to PPT. Normal speech and cognition.  PSYCHIATRIC: Normal insight and judgment. Cooperative with PE    Laboratory Data    Results from last 7 days   Lab Units 21  0335 21  0711 07/10/21  0842   WBC 10*3/mm3 16.24* 13.33* 15.93*   HEMOGLOBIN g/dL 10.1* 9.7* 10.6*   HEMATOCRIT % 31.7* 30.8* 32.9*   PLATELETS 10*3/mm3 382 359 341     Results from last 7 days   Lab Units 21  0335   SODIUM mmol/L 139   POTASSIUM mmol/L 3.9   CHLORIDE mmol/L 105   CO2 mmol/L 23.0   BUN mg/dL 8   CREATININE mg/dL 0.78   GLUCOSE mg/dL 87   CALCIUM mg/dL 9.2     Results from last 7 days   Lab Units 21  0612   ALK PHOS U/L 69   BILIRUBIN mg/dL 0.3   ALT (SGPT) U/L 6   AST (SGOT) U/L 11         Results from last 7 days   Lab Units 21  0713   CRP mg/dL 34.97*         Results from last 7 days   Lab Units 21  0711   CK TOTAL U/L 41     Results from last 7 days   Lab Units 21  1230   VANCOMYCIN TR mcg/mL <4.00*     Estimated Creatinine  Clearance: 127.3 mL/min (by C-G formula based on SCr of 0.78 mg/dL).      Microbiology:  Microbiology Results (last 10 days)     Procedure Component Value - Date/Time    Anaerobic Culture - Vaginal Fluid, Vagina [[id number]] Collected: 07/09/21 1523    Lab Status: Preliminary result Specimen: Vaginal Fluid Updated: 07/12/21 0932     Anaerobic Culture No anaerobes isolated at 3 days    Genital Culture - Vaginal Fluid, Vagina [[id number]]  (Abnormal) Collected: 07/09/21 1523    Lab Status: Final result Specimen: Vaginal Fluid Updated: 07/12/21 0952     Genital Culture Light growth (2+) Gardnerella vaginalis     Comment: Beta lactamase negative.        Gram Stain Rare (1+) WBCs seen      Rare (1+) Gram negative bacilli    MRSA Screen, PCR (Inpatient) - Swab, Nares [[id number]]  (Normal) Collected: 07/05/21 1828    Lab Status: Final result Specimen: Swab from Nares Updated: 07/05/21 2036     MRSA PCR Negative    Narrative:      MRSA Negative    COVID PRE-OP / PRE-PROCEDURE SCREENING ORDER (NO ISOLATION) - Swab, Nasopharynx [[id number]]  (Normal) Collected: 07/04/21 0302    Lab Status: Final result Specimen: Swab from Nasopharynx Updated: 07/04/21 0408    Narrative:      The following orders were created for panel order COVID PRE-OP / PRE-PROCEDURE SCREENING ORDER (NO ISOLATION) - Swab, Nasopharynx.  Procedure                               Abnormality         Status                     ---------                               -----------         ------                     COVID-19,CEPHEID,JORGE IN-...[[id number]]  Normal              Final result                 Please view results for these tests on the individual orders.    COVID-19,CEPHEID,JORGE IN-HOUSE(OR EMERGENT/ADD-ON),NP SWAB IN TRANSPORT MEDIA 3-4 HR TAT - Swab, Nasopharynx [[id number]]  (Normal) Collected: 07/04/21 0302    Lab Status: Final result Specimen: Swab from Nasopharynx Updated: 07/04/21 0408     COVID19 Not Detected    Narrative:      Fact sheet for providers:  https://www.fda.gov/media/050344/download     Fact sheet for patients: https://www.fda.gov/media/920007/download  Fact sheet for providers: https://www.fda.gov/media/442228/download     Fact sheet for patients: https://www.fda.gov/media/681493/download    Blood Culture - Blood, Arm, Left [[id number]] Collected: 07/04/21 0017    Lab Status: Final result Specimen: Blood from Arm, Left Updated: 07/09/21 0445     Blood Culture No growth at 5 days    Blood Culture - Blood, Arm, Right [[id number]] Collected: 07/04/21 0017    Lab Status: Final result Specimen: Blood from Arm, Right Updated: 07/09/21 0445     Blood Culture No growth at 5 days              Radiology:  Imaging Results (Last 72 Hours)     Procedure Component Value Units Date/Time    XR Abdomen KUB [[id number]] Collected: 07/11/21 1414     Updated: 07/11/21 2238    Narrative:      EXAMINATION: XR ABDOMEN KUB - 07/11/2021     INDICATION:  T81.43XA-Infection following a procedure, organ and space  surgical site, initial encounter; R10.30-Lower abdominal pain,  unspecified; Z90.710-Acquired absence of both cervix and uterus.  Abdominal pain. Distention.     COMPARISON: None.     FINDINGS: Gas is seen in normal caliber large and small bowel. Stomach  appears normally distended with air. No bowel wall edema, pneumatosis or  pathologic intra-abdominal mass effect is seen. A couple of tiny  phleboliths are noted in the pelvis.       Impression:      Nonobstructive bowel gas pattern.      DICTATED:   07/11/2021  EDITED/ls :   07/11/2021     This report was finalized on 7/11/2021 10:35 PM by Dr. Froy Xavier MD.               Impression:   - Sepsis with fever, tachycardia, leukocytosis, and post-operative pelvic abscesses- improved  - Multiple pelvic abscess after prior hysterectomy 5/27/21.  S/p surgery 7/9/21  - Leukocytosis/neutrophilia, worse  - Pelvic pain- improved  - Fever, improved  - Abnormal uterine bleeding/fibroid tumors s/p hysterectomy (vaginal-assisted) with  ovaries retained  -hypokalemia- ongoing but improved  - Bacterial vaginosis    PLAN/RECOMMENDATIONS:   Thank you for asking us to see Anisa Sosa, I recommend the following:  - Follow blood cultures. -no growth  - Continue Zosyn for empiric IA coverage  - Continue Daptomycin 6 mg/kg IV daily to empirically cover MRSA and VRE.   - s/p surgery 7/9- vaginal culture with  Gardnerella vaginalis.  - PICC placement    I am ok with the patient's discharge today. Note that her WBC is worse today but she clinically looks very well and we will closely monitor on an outpatient basis with repeat imaging next week. I am discussed INPAT abx through PIV vs. PICC and home antibiotics. The patient does not think that she would have transportation to infusion center daily and she would rather infuse at home so will plan for her to infuse at home through PICC line. Her  PICC needs to be placed prior to discharge. [facility]    UM/MIHIR:  1. Daptomycin 500 mg IV q24h  2. Zosyn 4.5 g IV q8h  3. Plan to continue the above antibiotics OPAT through [facility] at least through repeat CT AP on 7/21/21.   4. Attn Lisa Ye at [facility]: Please schedule the patient for repeat CT AP with IV contrast 7/21/21 at [facility] prior to her appointment  5. Weekly cbc with diff, cmp, esr, crp, and cpk level  6. Change the PICC line dressing weekly with a Biopatch or Tegaderm CHG gel dressing  7. Follow up in my clinic 7/21/21. Clinic to call with appointment details  8. Please fax a copy of my orders to [facility] at [phone] and call [phone] with final discharge plans    I discussed with Dr. Houston today      Titus Frias MD  7/12/2021  12:24 EDT

## 2021-07-12 NOTE — TELEPHONE ENCOUNTER
Caller: [facility]    Relationship to patient: Other    Best call back number: [phone]    New or established patient?  [x] New  [] Established    Date of discharge: 7/12/21    Facility discharged from: [facility]    Diagnosis/Symptoms: INTRA-ABDOMINAL ABSCESS POST-PROCEDURE    Length of stay (If applicable):     Specialty Only: Did you see a [facility] provider?    [] Yes  [] No  If so, who?

## 2021-07-12 NOTE — PROGRESS NOTES
"    [facility]  PROGRESS NOTE    Patient Name: Anisa Sosa  : 1990  MRN: [id number]    Date of Admission: 7/3/2021  Primary Care Physician: Provider, No Known    Subjective   Subjective     CC:  F/u intra-abdominal abscess    HPI:  Patient resting in bed. Complaining of more pain over the right side of her abdomen, does not feel like the current pain regimen is helping much. Denies nausea/vomting. Having bowel movements, thinks maybe she \"overdid it yesterday\".    ROS:  Gen- No fevers, chills  CV- No chest pain, palpitations  Resp- No cough, dyspnea  GI- No N/V/D, + abd pain    Objective   Objective     Vital Signs:   Temp:  [98.1 °F (36.7 °C)-98.9 °F (37.2 °C)] 98.5 °F (36.9 °C)  Heart Rate:  [65-85] 73  Resp:  [14-16] 16  BP: (120-130)/(85-92) 129/86        Physical Exam:  Constitutional: No acute distress, awake, alert  HENT: NCAT, mucous membranes moist  Respiratory: Clear to auscultation bilaterally, respiratory effort normal   Cardiovascular: RRR, no murmurs, rubs, or gallops  Gastrointestinal: Positive bowel sounds, soft, significantly tender to palpation in RLQ  Musculoskeletal: No bilateral ankle edema  Psychiatric: Appropriate affect, cooperative  Neurologic: Oriented x 3, strength symmetric in all extremities, Cranial Nerves grossly intact to confrontation, speech clear  Skin: No rashes      Results Reviewed:  Results from last 7 days   Lab Units 21  03321  0711 07/10/21  0842   WBC 10*3/mm3 16.24* 13.33* 15.93*   HEMOGLOBIN g/dL 10.1* 9.7* 10.6*   HEMATOCRIT % 31.7* 30.8* 32.9*   PLATELETS 10*3/mm3 382 359 341     Results from last 7 days   Lab Units 21  0335 21  0711 07/10/21  0842 21  2335 21  0612 21  0713   SODIUM mmol/L 139 136 139   < > 135* 137   POTASSIUM mmol/L 3.9 4.1 4.3  --  3.3* 2.6*   CHLORIDE mmol/L 105 106 107   < > 98 98   CO2 mmol/L 23.0 22.0 25.0   < > 29.0 31.0*   BUN mg/dL 8 4* 6   < > " 3* 2*   CREATININE mg/dL 0.78 0.65 0.63   < > 0.63 0.62   GLUCOSE mg/dL 87 93 122*   < > 121* 98   CALCIUM mg/dL 9.2 8.8 9.3   < > 8.6 8.7   ALT (SGPT) U/L  --   --   --   --  6 6   AST (SGOT) U/L  --   --   --   --  11 13    < > = values in this interval not displayed.     Estimated Creatinine Clearance: 127.3 mL/min (by C-G formula based on SCr of 0.78 mg/dL).    Microbiology Results Abnormal     Procedure Component Value - Date/Time    Genital Culture - Vaginal Fluid, Vagina [[id number]]  (Abnormal) Collected: 07/09/21 1523    Lab Status: Final result Specimen: Vaginal Fluid Updated: 07/12/21 0952     Genital Culture Light growth (2+) Gardnerella vaginalis     Comment: Beta lactamase negative.        Gram Stain Rare (1+) WBCs seen      Rare (1+) Gram negative bacilli    Anaerobic Culture - Vaginal Fluid, Vagina [[id number]] Collected: 07/09/21 1523    Lab Status: Preliminary result Specimen: Vaginal Fluid Updated: 07/12/21 0932     Anaerobic Culture No anaerobes isolated at 3 days    Blood Culture - Blood, Arm, Right [[id number]] Collected: 07/04/21 0017    Lab Status: Final result Specimen: Blood from Arm, Right Updated: 07/09/21 0445     Blood Culture No growth at 5 days    Blood Culture - Blood, Arm, Left [[id number]] Collected: 07/04/21 0017    Lab Status: Final result Specimen: Blood from Arm, Left Updated: 07/09/21 0445     Blood Culture No growth at 5 days    MRSA Screen, PCR (Inpatient) - Swab, Nares [[id number]]  (Normal) Collected: 07/05/21 1828    Lab Status: Final result Specimen: Swab from Nares Updated: 07/05/21 2036     MRSA PCR Negative    Narrative:      MRSA Negative    COVID PRE-OP / PRE-PROCEDURE SCREENING ORDER (NO ISOLATION) - Swab, Nasopharynx [[id number]]  (Normal) Collected: 07/04/21 0302    Lab Status: Final result Specimen: Swab from Nasopharynx Updated: 07/04/21 0408    Narrative:      The following orders were created for panel order COVID PRE-OP / PRE-PROCEDURE SCREENING ORDER (NO  ISOLATION) - Swab, Nasopharynx.  Procedure                               Abnormality         Status                     ---------                               -----------         ------                     COVID-19,CEPHEID,JORGE IN-...[[id number]]  Normal              Final result                 Please view results for these tests on the individual orders.    COVID-19,CEPHEID,JORGE IN-HOUSE(OR EMERGENT/ADD-ON),NP SWAB IN TRANSPORT MEDIA 3-4 HR TAT - Swab, Nasopharynx [[id number]]  (Normal) Collected: 07/04/21 0302    Lab Status: Final result Specimen: Swab from Nasopharynx Updated: 07/04/21 0408     COVID19 Not Detected    Narrative:      Fact sheet for providers: https://www.fda.gov/media/388551/download     Fact sheet for patients: https://www.fda.gov/media/247839/download  Fact sheet for providers: https://www.fda.gov/media/956679/download     Fact sheet for patients: https://www.fda.gov/media/594121/download          Imaging Results (Last 24 Hours)     Procedure Component Value Units Date/Time    XR Abdomen KUB [[id number]] Collected: 07/11/21 1414     Updated: 07/11/21 2238    Narrative:      EXAMINATION: XR ABDOMEN KUB - 07/11/2021     INDICATION:  T81.43XA-Infection following a procedure, organ and space  surgical site, initial encounter; R10.30-Lower abdominal pain,  unspecified; Z90.710-Acquired absence of both cervix and uterus.  Abdominal pain. Distention.     COMPARISON: None.     FINDINGS: Gas is seen in normal caliber large and small bowel. Stomach  appears normally distended with air. No bowel wall edema, pneumatosis or  pathologic intra-abdominal mass effect is seen. A couple of tiny  phleboliths are noted in the pelvis.       Impression:      Nonobstructive bowel gas pattern.      DICTATED:   07/11/2021  EDITED/ls :   07/11/2021     This report was finalized on 7/11/2021 10:35 PM by Dr. Froy Xavier MD.                 I have reviewed the medications:  Scheduled Meds:DAPTOmycin, 6 mg/kg (Adjusted),  Intravenous, Q24H  docusate sodium, 100 mg, Oral, BID  enoxaparin, 40 mg, Subcutaneous, Q24H  piperacillin-tazobactam, 4.5 g, Intravenous, Q8H  polyethylene glycol, 17 g, Oral, Daily  Sodium Chloride (PF), 10 mL, Intravenous, Q12H      Continuous Infusions:   PRN Meds:.•  acetaminophen  •  bisacodyl  •  HYDROcodone-acetaminophen  •  magnesium sulfate **OR** magnesium sulfate **OR** magnesium sulfate  •  ondansetron  •  ondansetron  •  [MAR Hold] phenol  •  potassium chloride **OR** potassium chloride **OR** potassium chloride    Assessment/Plan   Assessment & Plan     Active Hospital Problems    Diagnosis  POA   • Intra-abdominal abscess post-procedure [T81.43XA]  Yes   • Lower abdominal pain [R10.30]  Unknown   • Status post hysterectomy [Z90.710]  Unknown      Resolved Hospital Problems   No resolved problems to display.        Brief Hospital Course to date:  Anisa Sosa is a 31 y.o. female with history of elective laparoscopic hysterectomy in [place] on May 27.  She recently reports increased abdominal pain and anorexia as well as no fevers.  CT abdomen pelvis here showed extensive peritoneal inflammation throughout the pelvis with multiple small peripherally enhancing interloop fluid collections scattered in the low pelvis with the largest measuring 5.7 cm in the perirectal area.    Sepsis - POA   Multifocal Pelvic Abscesses s/p H 5/27/21   - s/p diagnostic laparoscopy 7/9 by [facility] and [facility]. Noted multiple adhesions s/p peritoneal lavage with 1L saline with polymycin.  Vaginal cuff with thick, white drainage - Cx positive for gardnerella   - ID following, continue Dapto/Zosyn, awaiting final abx plan pending cultures  - increase PRN pain medication to Norco 10/325 q4 hrs PRN  -  Having bowel movements, continue to encourage ambulation     DVT prophylaxis:  Medical and mechanical DVT prophylaxis orders are present.       Disposition: I expect the patient to be discharged when final abx plan in  place    CODE STATUS:   Code Status and Medical Interventions:   Ordered at: 07/04/21 0418     Level Of Support Discussed With:    Patient     Code Status:    CPR     Medical Interventions (Level of Support Prior to Arrest):    Full       Genia Rousseau DO  07/12/21

## 2021-07-12 NOTE — DISCHARGE INSTR - APPOINTMENTS
You have an appointment with Dr. Robertson at 9:20 am on 7/21/21   [facility]  **PLEASE ARRIVE AT 9:00 AM to fill out paper work before your appointment with Dr. Robertson***    **** Please call [phone] with any questions******    You have an appointment with PCP JACK Rey on July19th,2021 at 1PM    You have an appointment with Dr. Frias on July 21st at 2:45 pm

## 2021-07-12 NOTE — PROGRESS NOTES
Clinical Nutrition     Reason for Visit: LOS    Patient Name: Anisa Sosa  YOB: 1990  MRN: [id number]  Date of Encounter: 21 15:10 EDT  Admission date: 7/3/2021    Nutrition Assessment   Admission Problem List:  Sepsis  Pelvic abscess  s/p SVITLANA     s/p diagnostic lap, with peritoneal washout 21    h/o Ovarian Cyst, Fibroids  PMH:   She  has a past medical history of Anemia, History of chlamydia, and History of trichomonal vaginitis.  PSxH:  She  has a past surgical history that includes  section ();  section ();  section with tubal (); laparoscopic assisted vaginal hysterectomy (2021); and Laparoscopy (N/A, 2021).      Reported/Observed/Food/Nutrition Related History:     Pt sitting up in chair, reports feeling better, is eating more    Preparing for dc home    Anthropometrics   Height: 70in  Weight: 195lb  BMI: 27.9  BMI classification: Overweight: 25.0-29.9kg/m2      Labs reviewed     Results from last 7 days   Lab Units 21  0335 21  2335 21  0612   SODIUM mmol/L 139   < > 135*   POTASSIUM mmol/L 3.9  --  3.3*   CHLORIDE mmol/L 105   < > 98   CO2 mmol/L 23.0   < > 29.0   BUN mg/dL 8   < > 3*   CREATININE mg/dL 0.78   < > 0.63   CALCIUM mg/dL 9.2   < > 8.6   BILIRUBIN mg/dL  --   --  0.3   ALK PHOS U/L  --   --  69   ALT (SGPT) U/L  --   --  6   AST (SGOT) U/L  --   --  11   GLUCOSE mg/dL 87   < > 121*    < > = values in this interval not displayed.           No results found for: HGBA1C    Medications reviewed   Pertinent:abx, colace, lovenox, miralax    Intake/Ouptut 24 hrs (7:00AM - 6:59 AM)     Intake & Output (last day)        07 -  0700  07 -  0700    P.O.  500    I.V. (mL/kg) 500 (5.4)     IV Piggyback      Total Intake(mL/kg) 500 (5.4) 500 (5.4)    Net +500 +500          Urine Unmeasured Occurrence 1 x     Stool Unmeasured Occurrence 1 x                GI: abdomen  round, nontender, + bm    SKIN: surgical site    Current Nutrition Prescription     PO: Diet Regular    Intake: 39% of 7 meals    Nutrition Diagnosis   Date: 7-12-21  Problem Inadequate oral intake   Etiology Per Clinical Status   Signs/Symptoms Po intake 39%     Nutrition Intervention   1.  Follow treatment progress, Encourage intake      Goal:   General: Nutrition support treatment  PO: Increase intake    Monitoring/Evaluation:   Per protocol, I&O, PO intake, Pertinent labs, Weight, Skin status, GI status, Symptoms    Nathalie Guerra, WILFRID  Time Spent: 30min

## 2021-07-12 NOTE — PLAN OF CARE
Goal Outcome Evaluation:              Outcome Summary: Pt ambulated the halls beginning of shift. Complaints of pain addressed with PRN's. Pt crying when given 06:00 pain medication along with complaints of frequent urination and R lower abdomen pain. Ice pack applied to region. Lap dressings clean, dry and intact. Pt stated she had a BM today. VSS RA, will continue to monitor.

## 2021-07-12 NOTE — CASE MANAGEMENT/SOCIAL WORK
Continued Stay Note  [facility]     Patient Name: Anisa Sosa  MRN: [id number]  Today's Date: 7/12/2021    Admit Date: 7/3/2021    Discharge Plan     Row Name 07/12/21 7666       Plan    Plan  Social work faxed a letter to fax [phone] to notify the [facility] that the patient will continue antibotics after discharge.    Row Name 07/12/21 0537       Plan    Plan  CM update  - Home with IV antbx with [facility]    Plan Comments  CM noted MD consult for home IV antbx. Patient has order for a Picc line to be placed. MD orders regarding home IV antbx has been faxed to [facility]. OPAT nurse to arrange IV antbx and infusion teaching at bedside prior to discharge. CM will be sure follow ups are made and in [facility] [phone]        Discharge Codes    No documentation.       Expected Discharge Date and Time     Expected Discharge Date Expected Discharge Time    Jul 12, 2021             KARYNA Walsh

## 2021-07-12 NOTE — NURSING NOTE
[facility] Education Progress Note    Anisa Sosa  1990 - female    Education Date:  07/12/21  Provider: Monica Gonsalez RN  Location:  [facility]      Summary of Education Session:  Met with Anisa Sosa to review assembly and administration of IV antibiotic.  Demonstrated with proficiency.  Reviewed side effects of medication, care and complications of central line, use of probiotics, and 24 hour availability of RN for support.  Reviewed appointment date and time to include lab arrangements and providing supplies at each appointment.    Anticipated Discharge Date: 7/12/21    Follow Up Date: 7/21/21     Monica Gonsalez RN, 07/12/21 - 15:12 EDT

## 2021-07-13 ENCOUNTER — TRANSITIONAL CARE MANAGEMENT TELEPHONE ENCOUNTER (OUTPATIENT)
Dept: CALL CENTER | Facility: HOSPITAL | Age: 31
End: 2021-07-13

## 2021-07-13 ENCOUNTER — READMISSION MANAGEMENT (OUTPATIENT)
Dept: CALL CENTER | Facility: HOSPITAL | Age: 31
End: 2021-07-13

## 2021-07-13 NOTE — OUTREACH NOTE
Prep Survey      Responses   [facility] patient discharged from?  [place]   Is LACE score < 7 ?  No   Emergency Room discharge w/ pulse ox?  No   Eligibility  [facility]   Date of Admission  07/03/21   Date of Discharge  07/12/21   Discharge Disposition  Home or Self Care   Discharge diagnosis  Intra-abdominal abscess post-procedure Sepsis -Multifocal Pelvic Abscesses    Does the patient have one of the following disease processes/diagnoses(primary or secondary)?  General Surgery   Does the patient have Home health ordered?  No   Is there a DME ordered?  Yes   What DME was ordered?  IV abx per [facility]   Comments regarding appointments   Home with IV antbx with [facility]   Prep survey completed?  Yes          Cordelia Guthrie RN

## 2021-07-13 NOTE — OUTREACH NOTE
Call Center TCM Note      Responses   [facility] patient discharged from?  [place]   Does the patient have one of the following disease processes/diagnoses(primary or secondary)?  General Surgery   TCM attempt successful?  No   Unsuccessful attempts  Attempt 2 [no verbal release listed  to call mother]          Batool Johnson RN    7/13/2021, 09:38 EDT

## 2021-07-13 NOTE — OUTREACH NOTE
Call Center TCM Note      Responses   [facility] patient discharged from?  [place]   Does the patient have one of the following disease processes/diagnoses(primary or secondary)?  General Surgery   TCM attempt successful?  No   Unsuccessful attempts  Attempt 1          Batool Johnson RN    7/13/2021, 09:24 EDT

## 2021-07-14 ENCOUNTER — TRANSITIONAL CARE MANAGEMENT TELEPHONE ENCOUNTER (OUTPATIENT)
Dept: CALL CENTER | Facility: HOSPITAL | Age: 31
End: 2021-07-14

## 2021-07-14 LAB — BACTERIA SPEC ANAEROBE CULT: NORMAL

## 2021-07-14 NOTE — OUTREACH NOTE
Call Center TCM Note      Responses   [facility] patient discharged from?  [place]   Does the patient have one of the following disease processes/diagnoses(primary or secondary)?  General Surgery   TCM attempt successful?  No   Unsuccessful attempts  Attempt 3 [patients phone is busy]          Shalini Perdue RN    7/14/2021, 09:09 EDT

## 2021-07-21 ENCOUNTER — OFFICE VISIT (OUTPATIENT)
Dept: OBSTETRICS AND GYNECOLOGY | Facility: CLINIC | Age: 31
End: 2021-07-21

## 2021-07-21 ENCOUNTER — TRANSCRIBE ORDERS (OUTPATIENT)
Dept: LAB | Facility: HOSPITAL | Age: 31
End: 2021-07-21

## 2021-07-21 ENCOUNTER — HOSPITAL ENCOUNTER (OUTPATIENT)
Dept: CT IMAGING | Facility: HOSPITAL | Age: 31
Discharge: HOME | End: 2021-07-21

## 2021-07-21 ENCOUNTER — LAB (OUTPATIENT)
Dept: LAB | Facility: HOSPITAL | Age: 31
End: 2021-07-21

## 2021-07-21 VITALS
SYSTOLIC BLOOD PRESSURE: 122 MMHG | RESPIRATION RATE: 14 BRPM | DIASTOLIC BLOOD PRESSURE: 80 MMHG | WEIGHT: 177 LBS | BODY MASS INDEX: 25.4 KG/M2

## 2021-07-21 DIAGNOSIS — Z98.890 POST-OPERATIVE STATE: ICD-10-CM

## 2021-07-21 DIAGNOSIS — T81.43XA INTRA-ABDOMINAL ABSCESS POST-PROCEDURE: Primary | ICD-10-CM

## 2021-07-21 DIAGNOSIS — N73.8 OTHER SPECIFIED FEMALE PELVIC INFLAMMATORY DISEASES: ICD-10-CM

## 2021-07-21 DIAGNOSIS — K65.1 PERITONEAL ABSCESS (HCC): ICD-10-CM

## 2021-07-21 DIAGNOSIS — N73.8 OTHER SPECIFIED FEMALE PELVIC INFLAMMATORY DISEASES: Primary | ICD-10-CM

## 2021-07-21 LAB
ALBUMIN SERPL-MCNC: 4.4 G/DL (ref 3.5–5.2)
ALBUMIN/GLOB SERPL: 1.2 G/DL
ALP SERPL-CCNC: 65 U/L (ref 39–117)
ALT SERPL W P-5'-P-CCNC: 17 U/L (ref 1–33)
ANION GAP SERPL CALCULATED.3IONS-SCNC: 11 MMOL/L (ref 5–15)
AST SERPL-CCNC: 26 U/L (ref 1–32)
BASOPHILS # BLD AUTO: 0.06 10*3/MM3 (ref 0–0.2)
BASOPHILS NFR BLD AUTO: 0.7 % (ref 0–1.5)
BILIRUB SERPL-MCNC: 0.3 MG/DL (ref 0–1.2)
BUN SERPL-MCNC: 9 MG/DL (ref 6–20)
BUN/CREAT SERPL: 13.2 (ref 7–25)
CALCIUM SPEC-SCNC: 10.2 MG/DL (ref 8.6–10.5)
CHLORIDE SERPL-SCNC: 102 MMOL/L (ref 98–107)
CK SERPL-CCNC: 958 U/L (ref 20–180)
CO2 SERPL-SCNC: 27 MMOL/L (ref 22–29)
CREAT SERPL-MCNC: 0.68 MG/DL (ref 0.57–1)
CRP SERPL-MCNC: <0.3 MG/DL (ref 0–0.5)
DEPRECATED RDW RBC AUTO: 43.3 FL (ref 37–54)
EOSINOPHIL # BLD AUTO: 0.08 10*3/MM3 (ref 0–0.4)
EOSINOPHIL NFR BLD AUTO: 0.9 % (ref 0.3–6.2)
ERYTHROCYTE [DISTWIDTH] IN BLOOD BY AUTOMATED COUNT: 12.6 % (ref 12.3–15.4)
ERYTHROCYTE [SEDIMENTATION RATE] IN BLOOD: 33 MM/HR (ref 0–20)
GFR SERPL CREATININE-BSD FRML MDRD: 122 ML/MIN/1.73
GLOBULIN UR ELPH-MCNC: 3.6 GM/DL
GLUCOSE SERPL-MCNC: 98 MG/DL (ref 65–99)
HCT VFR BLD AUTO: 39 % (ref 34–46.6)
HGB BLD-MCNC: 12.4 G/DL (ref 12–15.9)
IMM GRANULOCYTES # BLD AUTO: 0.02 10*3/MM3 (ref 0–0.05)
IMM GRANULOCYTES NFR BLD AUTO: 0.2 % (ref 0–0.5)
LYMPHOCYTES # BLD AUTO: 1.53 10*3/MM3 (ref 0.7–3.1)
LYMPHOCYTES NFR BLD AUTO: 16.9 % (ref 19.6–45.3)
MCH RBC QN AUTO: 29.9 PG (ref 26.6–33)
MCHC RBC AUTO-ENTMCNC: 31.8 G/DL (ref 31.5–35.7)
MCV RBC AUTO: 94 FL (ref 79–97)
MONOCYTES # BLD AUTO: 0.47 10*3/MM3 (ref 0.1–0.9)
MONOCYTES NFR BLD AUTO: 5.2 % (ref 5–12)
NEUTROPHILS NFR BLD AUTO: 6.89 10*3/MM3 (ref 1.7–7)
NEUTROPHILS NFR BLD AUTO: 76.1 % (ref 42.7–76)
NRBC BLD AUTO-RTO: 0 /100 WBC (ref 0–0.2)
PLATELET # BLD AUTO: 392 10*3/MM3 (ref 140–450)
PMV BLD AUTO: 9.7 FL (ref 6–12)
POTASSIUM SERPL-SCNC: 4.9 MMOL/L (ref 3.5–5.2)
PROT SERPL-MCNC: 8 G/DL (ref 6–8.5)
RBC # BLD AUTO: 4.15 10*6/MM3 (ref 3.77–5.28)
SODIUM SERPL-SCNC: 140 MMOL/L (ref 136–145)
WBC # BLD AUTO: 9.05 10*3/MM3 (ref 3.4–10.8)

## 2021-07-21 PROCEDURE — 85652 RBC SED RATE AUTOMATED: CPT

## 2021-07-21 PROCEDURE — 80053 COMPREHEN METABOLIC PANEL: CPT

## 2021-07-21 PROCEDURE — 25010000002 IOPAMIDOL 61 % SOLUTION: Performed by: INTERNAL MEDICINE

## 2021-07-21 PROCEDURE — 82550 ASSAY OF CK (CPK): CPT

## 2021-07-21 PROCEDURE — 86140 C-REACTIVE PROTEIN: CPT

## 2021-07-21 PROCEDURE — 36415 COLL VENOUS BLD VENIPUNCTURE: CPT

## 2021-07-21 PROCEDURE — 99214 OFFICE O/P EST MOD 30 MIN: CPT | Performed by: OBSTETRICS & GYNECOLOGY

## 2021-07-21 PROCEDURE — 74177 CT ABD & PELVIS W/CONTRAST: CPT

## 2021-07-21 PROCEDURE — 85025 COMPLETE CBC W/AUTO DIFF WBC: CPT

## 2021-07-21 RX ORDER — IBUPROFEN 800 MG/1
800 TABLET ORAL EVERY 8 HOURS PRN
COMMUNITY
Start: 2021-06-30 | End: 2022-11-23 | Stop reason: ALTCHOICE

## 2021-07-21 RX ADMIN — IOPAMIDOL 95 ML: 612 INJECTION, SOLUTION INTRAVENOUS at 11:14

## 2021-07-21 NOTE — PROGRESS NOTES
Subjective   Chief Complaint   Patient presents with   • Follow-up     post-op     Anisa Sosa is a 31 y.o. year old  presenting to be seen for her post-operative visit.  She was admitted with a postoperative abscess following a hysterectomy done at outside hospital.  She received IV antibiotic therapy and required a diagnostic laparoscopy for peritoneal lavage given no improvement in pain with IV antibiotics only.  She improved after the peritoneal lavage and was discharged in improving condition.  Currently she reports no problems with eating, bowel movements, voiding, or wound drainage and pain is well controlled.  She reports she feels much better.  Reports her appetite is not quite where she wants it to be but has improved significantly since being discharged from the hospital.  She reports she has a CT scan and appointment with infectious disease today.  She has a PICC line in place and has been doing IV daptomycin and Zosyn.  She reports that she has developed a rash in the groin area.    There was no pathology result associated with Anisa's recent procedure.      OTHER THINGS SHE WANTS TO DISCUSS TODAY:  Nothing else    The following portions of the patient's history were reviewed and updated as appropriate:current medications, allergies, past family history, past medical history, past social history and past surgical history       Objective   /80   Resp 14   Wt 80.3 kg (177 lb)   LMP 2021   Breastfeeding No   BMI 25.40 kg/m²     General:  well developed; well nourished  no acute distress   Abdomen: soft, non-tender; no masses  no umbilical or inguinal hernias are present  no hepato-splenomegaly  incision is clean, dry, intact and without drainage   Pelvis: Not performed.          Assessment   1. S/P diagnostic laparoscopy with peritoneal lavage for post op abscess following a hysterectomy      Plan   1. Avoid tampons, douching and intercourse  2. Lifting restriction of no  more than 10-15 pounds  3. Nothing per vagina still until 6 weeks after her procedure  4. Continue care with ID to determine duration of IV abx therapy   5. The importance of keeping all planned follow-up and taking all medications as prescribed was emphasized.  6. Follow up for recheck of above in 3-4 weeks.    No orders of the defined types were placed in this encounter.         This note was electronically signed.    Amee Robertson MD  July 21, 2021    Note: Speech recognition transcription software may have been used to create portions of this document.  An attempt at proofreading has been made but errors in transcription could still be present.

## 2021-07-23 ENCOUNTER — READMISSION MANAGEMENT (OUTPATIENT)
Dept: CALL CENTER | Facility: HOSPITAL | Age: 31
End: 2021-07-23

## 2021-07-23 ENCOUNTER — APPOINTMENT (OUTPATIENT)
Dept: CT IMAGING | Facility: HOSPITAL | Age: 31
End: 2021-07-23

## 2021-07-23 NOTE — OUTREACH NOTE
General Surgery Week 2 Survey      Responses   [facility] patient discharged from?  [place]   Does the patient have one of the following disease processes/diagnoses(primary or secondary)?  General Surgery   Week 2 attempt successful?  Yes   Call start time  1237   Call end time  1240   Discharge diagnosis  Intra-abdominal abscess post-procedure Sepsis -Multifocal Pelvic Abscesses    Person spoke with today (if not patient) and relationship  Filomena WoodwardCharlesJohnnie   Meds reviewed with patient/caregiver?  Yes   Is the patient having any side effects they believe may be caused by any medication additions or changes?  No   Does the patient have all medications related to this admission filled (includes all antibiotics, pain medications, etc.)  Yes   Is the patient taking all medications as directed (includes completed medication regime)?  Yes   Medication comments  IV abx completed on 07/21.  She is now on oral abx.   Does the patient have a follow up appointment scheduled with their surgeon?  Yes   Has the patient kept scheduled appointments due by today?  Yes   Comments  Pt saw provider 07/21/21   Psychosocial issues?  No   Comments  Pt had a CT scan on wednesday.  She also f/u with infectious disease same day  She has been released to return back to work per mother.    Did the patient receive a copy of their discharge instructions?  Yes   Nursing interventions  Reviewed instructions with patient   What is the patient's perception of their health status since discharge?  Improving   Is the patient/caregiver able to teach back the hierarchy of who to call/visit for symptoms/problems? PCP, Specialist, Home health nurse, Urgent Care, ED, 911  Yes   Additional teach back comments  Pt has returned to work   Week 2 call completed?  Yes   Wrap up additional comments  Mother states patient has been doing well.  she has returned to work this week.  Mother denies needs at this time.           Mary Jama RN

## 2021-08-02 ENCOUNTER — OFFICE VISIT (OUTPATIENT)
Dept: FAMILY MEDICINE CLINIC | Facility: CLINIC | Age: 31
End: 2021-08-02

## 2021-08-02 VITALS
HEART RATE: 92 BPM | OXYGEN SATURATION: 100 % | TEMPERATURE: 97.2 F | BODY MASS INDEX: 25.2 KG/M2 | SYSTOLIC BLOOD PRESSURE: 122 MMHG | WEIGHT: 176 LBS | HEIGHT: 70 IN | DIASTOLIC BLOOD PRESSURE: 64 MMHG

## 2021-08-02 DIAGNOSIS — F41.8 SITUATIONAL ANXIETY: Primary | ICD-10-CM

## 2021-08-02 DIAGNOSIS — K59.09 OTHER CONSTIPATION: ICD-10-CM

## 2021-08-02 DIAGNOSIS — G47.01 INSOMNIA DUE TO MEDICAL CONDITION: ICD-10-CM

## 2021-08-02 PROCEDURE — 99203 OFFICE O/P NEW LOW 30 MIN: CPT | Performed by: PHYSICIAN ASSISTANT

## 2021-08-02 RX ORDER — FLUCONAZOLE 150 MG/1
TABLET ORAL
COMMUNITY
Start: 2021-07-22 | End: 2021-08-02

## 2021-08-02 RX ORDER — ERGOCALCIFEROL 1.25 MG/1
CAPSULE ORAL
COMMUNITY
Start: 2021-07-31 | End: 2021-08-02

## 2021-08-02 RX ORDER — AMOXICILLIN AND CLAVULANATE POTASSIUM 875; 125 MG/1; MG/1
1 TABLET, FILM COATED ORAL 2 TIMES DAILY
COMMUNITY
Start: 2021-07-21 | End: 2021-08-02

## 2021-08-02 RX ORDER — ESCITALOPRAM OXALATE 5 MG/1
5 TABLET ORAL DAILY
Qty: 30 TABLET | Refills: 3 | Status: SHIPPED | OUTPATIENT
Start: 2021-08-02 | End: 2023-02-16

## 2021-08-02 NOTE — PROGRESS NOTES
Subjective   Anisa Sosa is a 31 y.o. female  Establish Care (new patient establish care )      History of Present Illness  Patient is a pleasant 31-year-old female who presents today to establish care in our practice as a new patient.  She is experiencing difficulty awakening overnight and having trouble falling back asleep since her recent abdominal abscess and hysterectomy.  She also experiences constipation since then.  Is current using stool softeners daily.  Lastly she is experiencing some anxiety since her recent surgery.  She has a history of anxiety in the past tried Celexa but states that it made her feel lethargic.  She is back to work full-time at [employer] since her recent surgery.  The following portions of the patient's history were reviewed and updated as appropriate: allergies, current medications, past social history and problem list    Review of Systems   Constitutional: Negative for activity change, appetite change, chills, diaphoresis, fatigue, fever and unexpected weight change.   Respiratory: Negative for chest tightness and shortness of breath.    Cardiovascular: Negative.    Gastrointestinal: Positive for constipation. Negative for abdominal distention, abdominal pain, anal bleeding, blood in stool, diarrhea, nausea, rectal pain and vomiting.   Genitourinary: Negative.    Neurological: Negative for dizziness, tremors, weakness, light-headedness and headaches.   Psychiatric/Behavioral: Positive for sleep disturbance. Negative for agitation, behavioral problems, confusion, decreased concentration, dysphoric mood, hallucinations, self-injury and suicidal ideas. The patient is nervous/anxious. The patient is not hyperactive.        Objective     Vitals:    08/02/21 1255   BP: 122/64   Pulse: 92   Temp: 97.2 °F (36.2 °C)   SpO2: 100%       Physical Exam  Vitals and nursing note reviewed.   Constitutional:       General: She is not in acute distress.     Appearance: Normal appearance. She is  well-developed. She is not ill-appearing, toxic-appearing or diaphoretic.   HENT:      Head: Normocephalic and atraumatic.   Eyes:      Conjunctiva/sclera: Conjunctivae normal.   Neck:      Thyroid: No thyroid mass or thyromegaly.   Cardiovascular:      Rate and Rhythm: Normal rate and regular rhythm.      Heart sounds: Normal heart sounds.   Pulmonary:      Effort: Pulmonary effort is normal. No respiratory distress.   Abdominal:      Tenderness: There is no abdominal tenderness.   Skin:     General: Skin is dry.      Coloration: Skin is not pale.      Findings: No erythema or rash.   Neurological:      Mental Status: She is alert and oriented to person, place, and time.      Coordination: Coordination normal.   Psychiatric:         Attention and Perception: She is attentive.         Mood and Affect: Mood is anxious. Mood is not depressed. Affect is not angry or inappropriate.         Speech: Speech normal.         Behavior: Behavior normal.         Thought Content: Thought content normal.         Judgment: Judgment normal.         Assessment/Plan     Diagnoses and all orders for this visit:    1. Situational anxiety (Primary)    2. Other constipation    3. Insomnia due to medical condition    Other orders  -     escitalopram (Lexapro) 5 MG tablet; Take 1 tablet by mouth Daily. FOR ANXIETY  Dispense: 30 tablet; Refill: 3    Recommended adding on MiraLAX daily for constipation and backing off on stool softener when able.  If symptoms of sleep disturbance and/or situational anxiety unimproved after 4 weeks follow-up for recheck.  Recommended scheduling come in for an annual physical in the fall.

## 2021-08-03 ENCOUNTER — READMISSION MANAGEMENT (OUTPATIENT)
Dept: CALL CENTER | Facility: HOSPITAL | Age: 31
End: 2021-08-03

## 2021-08-03 NOTE — OUTREACH NOTE
General Surgery Week 3 Survey      Responses   [facility] patient discharged from?  [place]   Does the patient have one of the following disease processes/diagnoses(primary or secondary)?  General Surgery   Week 3 attempt successful?  No   Unsuccessful attempts  Attempt 1          Batool Johnson RN

## 2021-10-04 PROCEDURE — U0004 COV-19 TEST NON-CDC HGH THRU: HCPCS | Performed by: NURSE PRACTITIONER

## 2022-11-20 ENCOUNTER — HOSPITAL ENCOUNTER (EMERGENCY)
Facility: HOSPITAL | Age: 32
Discharge: HOME | End: 2022-11-20
Attending: EMERGENCY MEDICINE | Admitting: EMERGENCY MEDICINE

## 2022-11-20 ENCOUNTER — APPOINTMENT (OUTPATIENT)
Dept: GENERAL RADIOLOGY | Facility: HOSPITAL | Age: 32
End: 2022-11-20

## 2022-11-20 ENCOUNTER — APPOINTMENT (OUTPATIENT)
Dept: CT IMAGING | Facility: HOSPITAL | Age: 32
End: 2022-11-20

## 2022-11-20 VITALS
OXYGEN SATURATION: 100 % | HEART RATE: 88 BPM | DIASTOLIC BLOOD PRESSURE: 100 MMHG | TEMPERATURE: 97.9 F | SYSTOLIC BLOOD PRESSURE: 131 MMHG | RESPIRATION RATE: 16 BRPM | WEIGHT: 185 LBS | BODY MASS INDEX: 26.48 KG/M2 | HEIGHT: 70 IN

## 2022-11-20 DIAGNOSIS — E04.9 GOITER: ICD-10-CM

## 2022-11-20 DIAGNOSIS — R09.1 PLEURISY: Primary | ICD-10-CM

## 2022-11-20 LAB
ALBUMIN SERPL-MCNC: 4.3 G/DL (ref 3.5–5.2)
ALBUMIN/GLOB SERPL: 1.7 G/DL
ALP SERPL-CCNC: 60 U/L (ref 39–117)
ALT SERPL W P-5'-P-CCNC: 18 U/L (ref 1–33)
ANION GAP SERPL CALCULATED.3IONS-SCNC: 11 MMOL/L (ref 5–15)
AST SERPL-CCNC: 18 U/L (ref 1–32)
BASOPHILS # BLD AUTO: 0.04 10*3/MM3 (ref 0–0.2)
BASOPHILS NFR BLD AUTO: 0.4 % (ref 0–1.5)
BILIRUB SERPL-MCNC: 0.8 MG/DL (ref 0–1.2)
BUN SERPL-MCNC: 10 MG/DL (ref 6–20)
BUN/CREAT SERPL: 15.9 (ref 7–25)
CALCIUM SPEC-SCNC: 9.3 MG/DL (ref 8.6–10.5)
CHLORIDE SERPL-SCNC: 105 MMOL/L (ref 98–107)
CO2 SERPL-SCNC: 23 MMOL/L (ref 22–29)
CREAT SERPL-MCNC: 0.63 MG/DL (ref 0.57–1)
DEPRECATED RDW RBC AUTO: 43.1 FL (ref 37–54)
EGFRCR SERPLBLD CKD-EPI 2021: 120.3 ML/MIN/1.73
EOSINOPHIL # BLD AUTO: 0.03 10*3/MM3 (ref 0–0.4)
EOSINOPHIL NFR BLD AUTO: 0.3 % (ref 0.3–6.2)
ERYTHROCYTE [DISTWIDTH] IN BLOOD BY AUTOMATED COUNT: 12.2 % (ref 12.3–15.4)
ERYTHROCYTE [SEDIMENTATION RATE] IN BLOOD: 7 MM/HR (ref 0–20)
FLUAV SUBTYP SPEC NAA+PROBE: NOT DETECTED
FLUBV RNA ISLT QL NAA+PROBE: NOT DETECTED
GLOBULIN UR ELPH-MCNC: 2.6 GM/DL
GLUCOSE SERPL-MCNC: 92 MG/DL (ref 65–99)
HCT VFR BLD AUTO: 42 % (ref 34–46.6)
HGB BLD-MCNC: 13.7 G/DL (ref 12–15.9)
HOLD SPECIMEN: NORMAL
IMM GRANULOCYTES # BLD AUTO: 0.02 10*3/MM3 (ref 0–0.05)
IMM GRANULOCYTES NFR BLD AUTO: 0.2 % (ref 0–0.5)
LYMPHOCYTES # BLD AUTO: 2.44 10*3/MM3 (ref 0.7–3.1)
LYMPHOCYTES NFR BLD AUTO: 23.4 % (ref 19.6–45.3)
MCH RBC QN AUTO: 30.9 PG (ref 26.6–33)
MCHC RBC AUTO-ENTMCNC: 32.6 G/DL (ref 31.5–35.7)
MCV RBC AUTO: 94.8 FL (ref 79–97)
MONOCYTES # BLD AUTO: 0.64 10*3/MM3 (ref 0.1–0.9)
MONOCYTES NFR BLD AUTO: 6.1 % (ref 5–12)
NEUTROPHILS NFR BLD AUTO: 69.6 % (ref 42.7–76)
NEUTROPHILS NFR BLD AUTO: 7.26 10*3/MM3 (ref 1.7–7)
NRBC BLD AUTO-RTO: 0 /100 WBC (ref 0–0.2)
NT-PROBNP SERPL-MCNC: 9.7 PG/ML (ref 0–450)
PLATELET # BLD AUTO: 197 10*3/MM3 (ref 140–450)
PMV BLD AUTO: 10.6 FL (ref 6–12)
POTASSIUM SERPL-SCNC: 3.7 MMOL/L (ref 3.5–5.2)
PROT SERPL-MCNC: 6.9 G/DL (ref 6–8.5)
RBC # BLD AUTO: 4.43 10*6/MM3 (ref 3.77–5.28)
SARS-COV-2 RNA PNL SPEC NAA+PROBE: NOT DETECTED
SODIUM SERPL-SCNC: 139 MMOL/L (ref 136–145)
T3FREE SERPL-MCNC: 2.52 PG/ML (ref 2–4.4)
T4 FREE SERPL-MCNC: 1.05 NG/DL (ref 0.93–1.7)
TROPONIN T SERPL-MCNC: <0.01 NG/ML (ref 0–0.03)
TSH SERPL DL<=0.05 MIU/L-ACNC: 0.87 UIU/ML (ref 0.27–4.2)
WBC NRBC COR # BLD: 10.43 10*3/MM3 (ref 3.4–10.8)
WHOLE BLOOD HOLD COAG: NORMAL
WHOLE BLOOD HOLD SPECIMEN: NORMAL

## 2022-11-20 PROCEDURE — 25010000002 MORPHINE PER 10 MG: Performed by: EMERGENCY MEDICINE

## 2022-11-20 PROCEDURE — 83880 ASSAY OF NATRIURETIC PEPTIDE: CPT | Performed by: EMERGENCY MEDICINE

## 2022-11-20 PROCEDURE — 71045 X-RAY EXAM CHEST 1 VIEW: CPT

## 2022-11-20 PROCEDURE — 96374 THER/PROPH/DIAG INJ IV PUSH: CPT

## 2022-11-20 PROCEDURE — 84484 ASSAY OF TROPONIN QUANT: CPT | Performed by: EMERGENCY MEDICINE

## 2022-11-20 PROCEDURE — 85652 RBC SED RATE AUTOMATED: CPT | Performed by: EMERGENCY MEDICINE

## 2022-11-20 PROCEDURE — 80050 GENERAL HEALTH PANEL: CPT | Performed by: EMERGENCY MEDICINE

## 2022-11-20 PROCEDURE — 36415 COLL VENOUS BLD VENIPUNCTURE: CPT

## 2022-11-20 PROCEDURE — 93005 ELECTROCARDIOGRAM TRACING: CPT | Performed by: EMERGENCY MEDICINE

## 2022-11-20 PROCEDURE — 84481 FREE ASSAY (FT-3): CPT | Performed by: EMERGENCY MEDICINE

## 2022-11-20 PROCEDURE — 96375 TX/PRO/DX INJ NEW DRUG ADDON: CPT

## 2022-11-20 PROCEDURE — 99284 EMERGENCY DEPT VISIT MOD MDM: CPT

## 2022-11-20 PROCEDURE — 93005 ELECTROCARDIOGRAM TRACING: CPT

## 2022-11-20 PROCEDURE — 84439 ASSAY OF FREE THYROXINE: CPT | Performed by: EMERGENCY MEDICINE

## 2022-11-20 PROCEDURE — 87636 SARSCOV2 & INF A&B AMP PRB: CPT | Performed by: EMERGENCY MEDICINE

## 2022-11-20 PROCEDURE — 25010000002 KETOROLAC TROMETHAMINE PER 15 MG: Performed by: EMERGENCY MEDICINE

## 2022-11-20 PROCEDURE — 71275 CT ANGIOGRAPHY CHEST: CPT

## 2022-11-20 PROCEDURE — 0 IOPAMIDOL PER 1 ML: Performed by: EMERGENCY MEDICINE

## 2022-11-20 RX ORDER — KETOROLAC TROMETHAMINE 15 MG/ML
15 INJECTION, SOLUTION INTRAMUSCULAR; INTRAVENOUS ONCE
Status: COMPLETED | OUTPATIENT
Start: 2022-11-20 | End: 2022-11-20

## 2022-11-20 RX ORDER — TRAMADOL HYDROCHLORIDE 50 MG/1
50 TABLET ORAL EVERY 6 HOURS PRN
Qty: 15 TABLET | Refills: 0 | Status: SHIPPED | OUTPATIENT
Start: 2022-11-20 | End: 2023-02-16

## 2022-11-20 RX ORDER — CEFDINIR 300 MG/1
300 CAPSULE ORAL 2 TIMES DAILY
COMMUNITY
End: 2022-11-23

## 2022-11-20 RX ORDER — SODIUM CHLORIDE 0.9 % (FLUSH) 0.9 %
10 SYRINGE (ML) INJECTION AS NEEDED
Status: DISCONTINUED | OUTPATIENT
Start: 2022-11-20 | End: 2022-11-20 | Stop reason: HOSPADM

## 2022-11-20 RX ORDER — MORPHINE SULFATE 4 MG/ML
4 INJECTION, SOLUTION INTRAMUSCULAR; INTRAVENOUS ONCE
Status: COMPLETED | OUTPATIENT
Start: 2022-11-20 | End: 2022-11-20

## 2022-11-20 RX ORDER — PREDNISONE 20 MG/1
60 TABLET ORAL DAILY
Qty: 15 TABLET | Refills: 0 | Status: SHIPPED | OUTPATIENT
Start: 2022-11-20 | End: 2022-11-23

## 2022-11-20 RX ADMIN — KETOROLAC TROMETHAMINE 15 MG: 15 INJECTION, SOLUTION INTRAMUSCULAR; INTRAVENOUS at 12:32

## 2022-11-20 RX ADMIN — IOPAMIDOL 80 ML: 755 INJECTION, SOLUTION INTRAVENOUS at 13:11

## 2022-11-20 RX ADMIN — MORPHINE SULFATE 4 MG: 4 INJECTION, SOLUTION INTRAMUSCULAR; INTRAVENOUS at 12:32

## 2022-11-20 NOTE — ED PROVIDER NOTES
Subjective   History of Present Illness    Pt presents with 2 day history of right sided chest pain.  Pressure and sharp, worse with breathing and leaning forward.  Mild dyspnea. No leg swelling.  About 9 days ago she had cough, chills, fever and myalgias.  Went to [facility] ED and was told she had a URI, treated with steroids.  Her URI symptoms are improved.      Denies PMH, takes no meds.    History provided by:  Patient      Review of Systems   Constitutional: Positive for fever.   Respiratory: Positive for cough and shortness of breath.    Cardiovascular: Positive for chest pain. Negative for leg swelling.   Gastrointestinal: Negative for vomiting.   All other systems reviewed and are negative.      Past Medical History:   Diagnosis Date   • Anemia    • History of chlamydia    • History of trichomonal vaginitis    • Ovarian cyst    • Visual impairment        No Known Allergies    Past Surgical History:   Procedure Laterality Date   •  SECTION     •  SECTION     •  SECTION WITH TUBAL  2013   • DIAGNOSTIC LAPAROSCOPY N/A 2021    Procedure: DIAGNOSTIC LAPAROSCOPY WITH WASHOUT;  Surgeon: Amee Robertson MD;  Location: [facility];  Service: Obstetrics/Gynecology;  Laterality: N/A;   • LAPAROSCOPIC ASSISTED VAGINAL HYSTERECTOMY  2021    for AUB-L, pelvic pain. Ovaries retained per patient. Dr. Avani Horta.        Family History   Problem Relation Age of Onset   • Breast cancer Maternal Grandmother    • Hyperlipidemia Mother    • Hypertension Mother    • Diabetes Mother    • Ovarian cancer Neg Hx    • Colon cancer Neg Hx        Social History     Socioeconomic History   • Marital status: Single   Tobacco Use   • Smoking status: Every Day     Packs/day: 0.50     Types: Cigarettes   • Smokeless tobacco: Never   • Tobacco comments:     off and on for 3 years    Vaping Use   • Vaping Use: Never used   Substance and Sexual Activity   • Alcohol use: Yes     Comment: occ   • Drug  use: Never   • Sexual activity: Defer           Objective   Physical Exam  Vitals and nursing note reviewed.   Constitutional:       General: She is not in acute distress.     Appearance: Normal appearance. She is not ill-appearing.   HENT:      Head: Normocephalic and atraumatic.      Mouth/Throat:      Mouth: Mucous membranes are moist.   Eyes:      General: No scleral icterus.        Right eye: No discharge.         Left eye: No discharge.      Conjunctiva/sclera: Conjunctivae normal.   Cardiovascular:      Rate and Rhythm: Normal rate and regular rhythm.      Heart sounds: No murmur heard.     Comments: No friction rub, listened in multiple positions  Pulmonary:      Effort: Pulmonary effort is normal. No respiratory distress.      Breath sounds: Normal breath sounds. No wheezing.   Abdominal:      General: Bowel sounds are normal. There is no distension.      Palpations: Abdomen is soft.      Tenderness: There is no abdominal tenderness. There is no guarding or rebound.   Musculoskeletal:         General: No swelling. Normal range of motion.      Cervical back: Normal range of motion and neck supple.   Skin:     General: Skin is warm and dry.      Findings: No rash.   Neurological:      General: No focal deficit present.      Mental Status: She is alert. Mental status is at baseline.   Psychiatric:         Mood and Affect: Mood normal.         Behavior: Behavior normal.         Thought Content: Thought content normal.         Procedures           ED Course         EKG NSR.  CTA negative. Labs benign.  Pain better with meds.  Suspect pleurisy given nature and timing.    Also noted goiter on CT, pt does not have known thyroid disease and no current symptoms of it.  Recommend outpatient followup.  I added on TSH, T4 and T3 so when she goes to the office they will already have those results.      Patient stable on serial rechecks.  Discussed findings, concerns, plan of care, expected course, reasons to return and  followup.  Provided the opportunity to ask questions.                            TONIO reviewed by Taiwo Shepard MD       MDM  Number of Diagnoses or Management Options     Amount and/or Complexity of Data Reviewed  Clinical lab tests: ordered and reviewed  Tests in the radiology section of CPT®: ordered and reviewed  Independent visualization of images, tracings, or specimens: yes        Final diagnoses:   Pleurisy   Goiter       ED Disposition  ED Disposition     ED Disposition   Discharge    Condition   Stable    Comment   --             Nidia Santos, PA-C  [address] [id number] [phone]    In 1 week           Medication List      New Prescriptions    predniSONE 20 MG tablet  Commonly known as: DELTASONE  Take 3 tablets by mouth Daily.     traMADol 50 MG tablet  Commonly known as: ULTRAM  Take 1 tablet by mouth Every 6 (Six) Hours As Needed for Moderate Pain.           Where to Get Your Medications      These medications were sent to [facility]/pharmacy #[id number] [place], [place] - [address] [place] [address] AT CORNER OF [address] [phone] [facility] [phone] [address] [address]    Hours: 24-hours Phone: [phone]   · predniSONE 20 MG tablet  · traMADol 50 MG tablet          Taiwo Shepard MD  11/20/22 9496

## 2022-11-23 ENCOUNTER — OFFICE VISIT (OUTPATIENT)
Dept: FAMILY MEDICINE CLINIC | Facility: CLINIC | Age: 32
End: 2022-11-23

## 2022-11-23 VITALS — DIASTOLIC BLOOD PRESSURE: 80 MMHG | SYSTOLIC BLOOD PRESSURE: 128 MMHG | OXYGEN SATURATION: 99 %

## 2022-11-23 DIAGNOSIS — E04.9 THYROID GOITER: ICD-10-CM

## 2022-11-23 DIAGNOSIS — R07.81 PLEURITIC CHEST PAIN: Primary | ICD-10-CM

## 2022-11-23 PROCEDURE — 99213 OFFICE O/P EST LOW 20 MIN: CPT | Performed by: PHYSICIAN ASSISTANT

## 2022-11-23 RX ORDER — FLUTICASONE PROPIONATE AND SALMETEROL 100; 50 UG/1; UG/1
1 POWDER RESPIRATORY (INHALATION)
Qty: 14 EACH | Refills: 0 | Status: SHIPPED | OUTPATIENT
Start: 2022-11-23 | End: 2022-12-21

## 2022-11-23 RX ORDER — MELOXICAM 7.5 MG/1
7.5 TABLET ORAL DAILY
Qty: 10 TABLET | Refills: 0 | Status: SHIPPED | OUTPATIENT
Start: 2022-11-23 | End: 2023-02-16

## 2022-11-23 RX ORDER — METHYLPREDNISOLONE 4 MG/1
TABLET ORAL
Qty: 1 EACH | Refills: 0 | Status: SHIPPED | OUTPATIENT
Start: 2022-11-23 | End: 2023-02-16

## 2022-11-23 NOTE — PROGRESS NOTES
Subjective   Anisa Sosa is a 32 y.o. female  Pain With Breathing (Follow up on Pleurisy and SOB, seen at  ED on 11/20) and Goiter (Follow up on goiter found on thyroid at ED)      History of Present Illness     The patient presents today for follow-up on recent diagnosis of goiter at the ED from a Cat scan and also for pleurisy. Please see emergency room reports including Cat scan of chest and labs which the provider has reviewed in full detail from 11/20/2022.     The patient reports that she was given an antibiotic and a couple of prescriptions for the pleurisy. She states that she was given prednisone 3 tablets a day, which helps, but her throat tightens right back up. The patient reports that she has been on the prednisone since 11/20/2022. She states that she has a dry cough. The patient states she still has trouble breathing. The patient reports that she started feeling the chest pain 2 to 5 days ago and she went to the emergency room in [place]. She states that she was told that she had an upper respiratory infection. The patient reports that she has been tested for influenza and COVID-19, which have been negative. She states that it is hurting when she takes a deep breath. The patient reports that she has asthma when she plays sports. She states that she has 1 more day of the prednisone. The patient reports that she is taking ibuprofen, which is not helping. She states that she has an albuterol inhaler, which she uses when she gets really tight. The patient reports that she has never used Advair or Symbicort.    The patient reports that her mother's sister has thyroid issues. She states that her mother had to have her thyroid operated on.      The following portions of the patient's history were reviewed and updated as appropriate: allergies, current medications, past social history and problem list    Review of Systems   Constitutional: Positive for activity change.   Respiratory: Positive for  cough, chest tightness and shortness of breath.    Cardiovascular: Positive for chest pain.       Objective     Vitals:    11/23/22 1353   BP: 128/80   SpO2: 99%       Physical Exam  Vitals and nursing note reviewed.   Constitutional:       General: She is not in acute distress.     Appearance: Normal appearance. She is well-developed. She is not ill-appearing, toxic-appearing or diaphoretic.   Neck:      Vascular: No carotid bruit or JVD.   Cardiovascular:      Rate and Rhythm: Normal rate and regular rhythm.      Pulses: Normal pulses.      Heart sounds: Normal heart sounds. No murmur heard.  Pulmonary:      Effort: No respiratory distress.      Breath sounds: Normal breath sounds.   Chest:      Chest wall: Tenderness present.   Abdominal:      Palpations: Abdomen is soft.      Tenderness: There is no abdominal tenderness.   Skin:     General: Skin is warm and dry.   Neurological:      Mental Status: She is alert.         Assessment & Plan     Diagnoses and all orders for this visit:    1. Pleuritic chest pain (Primary)    2. Thyroid goiter  -     Ambulatory Referral to Endocrinology    Other orders  -     Fluticasone-Salmeterol (ADVAIR/WIXELA) 100-50 MCG/ACT DISKUS; Inhale 1 puff 2 (Two) Times a Day.  Dispense: 14 each; Refill: 0  -     methylPREDNISolone (MEDROL) 4 MG dose pack; Take as directed on package instructions.  Dispense: 1 each; Refill: 0  -     meloxicam (Mobic) 7.5 MG tablet; Take 1 tablet by mouth Daily. For chest wall pain  Dispense: 10 tablet; Refill: 0     The patient's thyroid is slightly enlarged on the Cat scan, but her thyroid blood work was normal. The patient will be referred to endocrinology for further evaluation and treatment. The patient will be switched to a Medrol Dosepak. She will discontinue ibuprofen and prednisone. The patient will be switched to a different steroid and a different anti-inflammatory. The patient will be prescribed Advair to be used for 12 hours. She is to rinse  her mouth after use. She is to let us know if her breathing does not improve after the weekend. The patient will receive a work note for her visit.     The patient will follow up after the weekend or as needed for pain.       Transcribed from ambient dictation for Nidia Santos PA-C by Christelle Galeas  11/23/22   15:11 EST    Patient or patient representative verbalized consent to the visit recording.  I have personally performed the services described in this document as transcribed by the above individual, and it is both accurate and complete.  Nidia Santos PA-C  11/23/2022  17:05 EST

## 2022-11-25 LAB
QT INTERVAL: 364 MS
QTC INTERVAL: 427 MS

## 2022-12-21 RX ORDER — FLUTICASONE PROPIONATE AND SALMETEROL 50; 100 UG/1; UG/1
POWDER RESPIRATORY (INHALATION)
Qty: 60 EACH | Refills: 1 | Status: SHIPPED | OUTPATIENT
Start: 2022-12-21

## 2023-02-16 ENCOUNTER — OFFICE VISIT (OUTPATIENT)
Dept: ENDOCRINOLOGY | Facility: CLINIC | Age: 33
End: 2023-02-16
Payer: COMMERCIAL

## 2023-02-16 VITALS
HEART RATE: 87 BPM | HEIGHT: 70 IN | BODY MASS INDEX: 26.92 KG/M2 | SYSTOLIC BLOOD PRESSURE: 132 MMHG | DIASTOLIC BLOOD PRESSURE: 92 MMHG | OXYGEN SATURATION: 98 % | WEIGHT: 188 LBS

## 2023-02-16 DIAGNOSIS — R20.0 NUMBNESS AND TINGLING: ICD-10-CM

## 2023-02-16 DIAGNOSIS — L65.9 HAIR LOSS: ICD-10-CM

## 2023-02-16 DIAGNOSIS — R20.2 NUMBNESS AND TINGLING: ICD-10-CM

## 2023-02-16 DIAGNOSIS — G47.00 INSOMNIA, UNSPECIFIED TYPE: ICD-10-CM

## 2023-02-16 DIAGNOSIS — E04.0 SIMPLE GOITER: Primary | ICD-10-CM

## 2023-02-16 DIAGNOSIS — R53.82 CHRONIC FATIGUE: ICD-10-CM

## 2023-02-16 LAB
DEPRECATED RDW RBC AUTO: 41.1 FL (ref 37–54)
ERYTHROCYTE [DISTWIDTH] IN BLOOD BY AUTOMATED COUNT: 12.2 % (ref 12.3–15.4)
HCT VFR BLD AUTO: 42.2 % (ref 34–46.6)
HGB BLD-MCNC: 14.4 G/DL (ref 12–15.9)
MCH RBC QN AUTO: 31.6 PG (ref 26.6–33)
MCHC RBC AUTO-ENTMCNC: 34.1 G/DL (ref 31.5–35.7)
MCV RBC AUTO: 92.7 FL (ref 79–97)
PLATELET # BLD AUTO: 247 10*3/MM3 (ref 140–450)
PMV BLD AUTO: 10.7 FL (ref 6–12)
RBC # BLD AUTO: 4.55 10*6/MM3 (ref 3.77–5.28)
WBC NRBC COR # BLD: 9.56 10*3/MM3 (ref 3.4–10.8)

## 2023-02-16 PROCEDURE — 82746 ASSAY OF FOLIC ACID SERUM: CPT | Performed by: INTERNAL MEDICINE

## 2023-02-16 PROCEDURE — 82306 VITAMIN D 25 HYDROXY: CPT | Performed by: INTERNAL MEDICINE

## 2023-02-16 PROCEDURE — 76536 US EXAM OF HEAD AND NECK: CPT | Performed by: INTERNAL MEDICINE

## 2023-02-16 PROCEDURE — 99204 OFFICE O/P NEW MOD 45 MIN: CPT | Performed by: INTERNAL MEDICINE

## 2023-02-16 PROCEDURE — 84466 ASSAY OF TRANSFERRIN: CPT | Performed by: INTERNAL MEDICINE

## 2023-02-16 PROCEDURE — 82607 VITAMIN B-12: CPT | Performed by: INTERNAL MEDICINE

## 2023-02-16 PROCEDURE — 83540 ASSAY OF IRON: CPT | Performed by: INTERNAL MEDICINE

## 2023-02-16 PROCEDURE — 80050 GENERAL HEALTH PANEL: CPT | Performed by: INTERNAL MEDICINE

## 2023-02-16 RX ORDER — MULTIPLE VITAMINS W/ MINERALS TAB 9MG-400MCG
1 TAB ORAL DAILY
COMMUNITY

## 2023-02-16 NOTE — PROGRESS NOTES
Chief Complaint   Patient presents with   • Goiter        Referring Provider  Nidia Santos, STEVEN     HPI   Anisa Sosa is a 33 y.o. female had concerns including Goiter.     Patient here today as a new consult referred by PCP for incidentally noted thyroid enlargement on CT scan checked in November when patient was in the ER and diagnosed with pleurisy.  She has never had a dedicated thyroid ultrasound.  Thyroid function was normal when checked in November.    Two years ago after her hysterectomy she ended up with an intraabdominal infection and was in the hospital for 2 weeks. Since then has had a decline in her health.  Recently has developed tingling in her feet and hands, significant fatigue, difficulty eating due to bloating/constipation/diarrhea.   Mood lability - sadness, anxious at times.   Sleep is not good. Is a single mom, had three daughters, and is  for two sports.   Has trouble falling to sleep - gets to bed around 10, asleep by 12, then wakes often overnight. Gets maybe only 5 hrs disrupted sleep.   Hasn't tried any OTC sleep aids.     Hair loss started two years ago and is progressing. Hasn't had COVID that she knows of.   Increase in thirst recently though not consistently with polyuria.   Feels like she is losing weight - is eating only one meal per day because her appetite is down. Weight per our charts seems mostly stable.  Last night ate only 2 bites of steak and baked potato.     She notes numbness and tingling in her hands and feet at times.  This seems to be occurring more frequently.  She takes a multivitamin for hair and nails.    Denies reflux. 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     Past Medical History:   Diagnosis Date   • Anemia    • Goiter    • History of chlamydia    • History of trichomonal vaginitis    • Ovarian cyst    • Visual impairment      Past Surgical History:   Procedure Laterality Date   •  SECTION      •  SECTION     •  SECTION WITH TUBAL  2013   • DIAGNOSTIC LAPAROSCOPY N/A 2021    Procedure: DIAGNOSTIC LAPAROSCOPY WITH WASHOUT;  Surgeon: Amee Robertson MD;  Location: [facility];  Service: Obstetrics/Gynecology;  Laterality: N/A;   • LAPAROSCOPIC ASSISTED VAGINAL HYSTERECTOMY  2021    for AUB-L, pelvic pain. Ovaries retained per patient. Dr. Avani Horta.       Family History   Problem Relation Age of Onset   • Hyperlipidemia Mother    • Hypertension Mother    • Diabetes Mother    • No Known Problems Father    • Heart disease Brother    • Thyroid disease Maternal Grandmother    • Breast cancer Maternal Grandmother    • Ovarian cancer Neg Hx    • Colon cancer Neg Hx       Social History     Socioeconomic History   • Marital status: Single   Tobacco Use   • Smoking status: Every Day     Packs/day: 0.50     Types: Cigarettes   • Smokeless tobacco: Never   • Tobacco comments:     off and on for 3 years    Vaping Use   • Vaping Use: Never used   Substance and Sexual Activity   • Alcohol use: Yes     Comment: social   • Drug use: Never   • Sexual activity: Defer      No Known Allergies   Current Outpatient Medications on File Prior to Visit   Medication Sig Dispense Refill   • Advair Diskus 100-50 MCG/ACT DISKUS INHALE 1 PUFF TWICE A DAY 60 each 1   • multivitamin with minerals tablet tablet Take 1 tablet by mouth Daily.     • Probiotic Product (PROBIOTIC DAILY PO) Take  by mouth.     • [DISCONTINUED] ALBUTEROL IN Inhale 90 mcg Every 4 (Four) Hours As Needed.     • [DISCONTINUED] docusate sodium 100 MG capsule Take 1 capsule by mouth 2 (Two) Times a Day. 60 capsule 1   • [DISCONTINUED] escitalopram (Lexapro) 5 MG tablet Take 1 tablet by mouth Daily. FOR ANXIETY 30 tablet 3   • [DISCONTINUED] meloxicam (Mobic) 7.5 MG tablet Take 1 tablet by mouth Daily. For chest wall pain 10 tablet 0   • [DISCONTINUED] methylPREDNISolone (MEDROL) 4 MG dose pack Take as directed on package  "instructions. 1 each 0   • [DISCONTINUED] ondansetron (ZOFRAN) 4 MG tablet Take 1 tablet by mouth Every 6 (Six) Hours As Needed for Nausea or Vomiting. 30 tablet 0   • [DISCONTINUED] traMADol (ULTRAM) 50 MG tablet Take 1 tablet by mouth Every 6 (Six) Hours As Needed for Moderate Pain. 15 tablet 0     No current facility-administered medications on file prior to visit.        Review of Systems   Constitutional: Positive for appetite change, diaphoresis and fatigue.   HENT: Positive for sore throat and voice change.    Eyes: Positive for photophobia.   Gastrointestinal: Positive for abdominal pain, constipation and diarrhea.   Endocrine: Positive for cold intolerance and polydipsia.        Hair loss   Neurological: Positive for numbness.   Hematological: Bruises/bleeds easily.   Psychiatric/Behavioral: Positive for agitation and dysphoric mood. The patient is nervous/anxious.         /92 (BP Location: Left arm, Patient Position: Sitting, Cuff Size: Adult)   Pulse 87   Ht 177.8 cm (70\")   Wt 85.3 kg (188 lb)   LMP 05/20/2021   SpO2 98%   BMI 26.98 kg/m²      Physical Exam    Constitutional:  well developed; well nourished  no acute distress   ENT/Thyroid: mild thyromegaly  no palpable nodules   Eyes: EOM intact  Conjunctiva: clear   Respiratory:  breathing is unlabored  clear to auscultation bilaterally   Cardiovascular:  regular rate and rhythm, S1, S2 normal, no murmur, click, rub or gallop   Chest:  Not performed.   Abdomen: Not performed.   : Not performed.   Musculoskeletal: negative findings:  ROM of all joints is normal, no deformities present   Skin: dry and warm   Neuro: normal without focal findings and mental status, speech normal, alert and oriented x3   Psych: oriented to time, place and person, mood and affect are within normal limits     Labs/Imaging  CMP  Lab Results   Component Value Date    GLUCOSE 92 11/20/2022    BUN 10 11/20/2022    CREATININE 0.63 11/20/2022    EGFRIFAFRI 122 " 07/21/2021    BCR 15.9 11/20/2022    K 3.7 11/20/2022    CO2 23.0 11/20/2022    CALCIUM 9.3 11/20/2022    ALBUMIN 4.30 11/20/2022    AST 18 11/20/2022    ALT 18 11/20/2022        CBC w/DIFF   Lab Results   Component Value Date    WBC 10.43 11/20/2022    RBC 4.43 11/20/2022    HGB 13.7 11/20/2022    HCT 42.0 11/20/2022    MCV 94.8 11/20/2022    MCH 30.9 11/20/2022    MCHC 32.6 11/20/2022    RDW 12.2 (L) 11/20/2022    RDWSD 43.1 11/20/2022    MPV 10.6 11/20/2022     11/20/2022    NEUTRORELPCT 69.6 11/20/2022    LYMPHORELPCT 23.4 11/20/2022    MONORELPCT 6.1 11/20/2022    EOSRELPCT 0.3 11/20/2022    BASORELPCT 0.4 11/20/2022    AUTOIGPER 0.2 11/20/2022    NEUTROABS 7.26 (H) 11/20/2022    LYMPHSABS 2.44 11/20/2022    MONOSABS 0.64 11/20/2022    EOSABS 0.03 11/20/2022    BASOSABS 0.04 11/20/2022    AUTOIGNUM 0.02 11/20/2022    NRBC 0.0 11/20/2022       TSH  Lab Results   Component Value Date    TSH 0.872 11/20/2022     T4  Lab Results   Component Value Date    FREET4 1.05 11/20/2022     T3  Lab Results   Component Value Date    T3FREE 2.52 11/20/2022     Examination: CT ANGIOGRAM CHEST-     Date of Exam: 11/20/2022 1:05 PM     Indication: pleuritic right side chest pain.     Comparison: None available.     Technique: Contrast enhanced axial volumetric CT imaging of the chest  was performed utilizing 80 mL Isovue-370 IV contrast. Automated exposure  control and iterative reconstruction methods were used.     Findings:  There is no pneumothorax, pleural effusion or focal airspace  consolidation. No significant pleural disease. No suspicious lung  nodules.  Central and segmental airways appear patent. No evidence of  pulmonary embolism.     The thyroid appears mildly enlarged and diminished in attenuation. The  trachea and esophagus appear unremarkable.  Heart size is normal.  No  significant pericardial effusion.  No evidence of mediastinal or hilar  lymphadenopathy.  The great vessels, aorta and pulmonary artery  appear  within normal limits.     Subcutaneous fat and underlying musculature appear within normal limits.   There are no acute osseous abnormalities or destructive bone lesions.  Limited images of the upper abdomen demonstrate no acute findings.     IMPRESSION:     1. No acute cardiopulmonary abnormality. No evidence of pulmonary  embolism.  2. Enlarged hypoattenuating thyroid concerning for goiter. Correlate  with thyroid function.     This report was finalized on 11/20/2022 1:41 PM by Ricardo Ku MD.       Thyroid Ultrasound 02/16/23    Indication: goiter  Comparison Imaging: CT 11/20/22  Clinical History: normal thyroid function, thyroid enlargement noted on CT 11/2022    Real time high resolution imaging of the thyroid gland was performed in transverse and longitudinal planes. Previous imaging reports were reviewed if available and compared to the current to assess stability.     Lobes:  The right lobe measured 6.3 cm L x 1.8 cm AP x 2.7 cm in TV dimension.    The isthmus measured 0.4 cm in thickness.    The left thyroid lobe measured 5.9 cm L x 1.7 cm AP x 2.1 cm in TV dimension.    Thyroid gland is mildly enlarged, homogeneous and contains no nodules.    No pathologic lymph nodes were seen.    Impression:  Mildly enlarged thyroid but homogeneous with no nodules.    Recommendation:  No ultrasound monitoring is necessary.      Assessment and Plan    Diagnoses and all orders for this visit:    1. Simple goiter (Primary)  Simple homogenous goiter noted on ultrasound today.  No ultrasound monitoring is necessary.  Recheck TSH today to monitor trend though it was normal in November.  -     US Thyroid  -     TSH Rfx On Abnormal To Free T4; Future    2. Numbness and tingling  Check B12 and folate levels.  -     Vitamin B12; Future  -     Folate; Future    3. Chronic fatigue  Suspect this is predominantly related to busy schedule in combination with insomnia/disrupted and inadequate sleep.  Check labs to rule out  other causes.  -     TSH Rfx On Abnormal To Free T4; Future  -     CBC (No Diff); Future  -     Iron Profile; Future  -     Comprehensive Metabolic Panel; Future  -     Vitamin B12; Future  -     Folate; Future  -     Vitamin D,25-Hydroxy; Future    4. Insomnia, unspecified type  Recommended OTC melatonin and/or Unisom.    5. Hair loss  Could be related to poor sleep and stress.  Lab evaluation as above to rule out medical cause.       Return if symptoms worsen or fail to improve. The patient was instructed to contact the clinic with any interval questions or concerns.    Myra Machuca, DO   Endocrinologist    Please note that portions of this note were completed with a voice recognition program.

## 2023-02-17 LAB
25(OH)D3 SERPL-MCNC: 31.4 NG/ML (ref 30–100)
ALBUMIN SERPL-MCNC: 4.9 G/DL (ref 3.5–5.2)
ALBUMIN/GLOB SERPL: 1.8 G/DL
ALP SERPL-CCNC: 72 U/L (ref 39–117)
ALT SERPL W P-5'-P-CCNC: 22 U/L (ref 1–33)
ANION GAP SERPL CALCULATED.3IONS-SCNC: 7.5 MMOL/L (ref 5–15)
AST SERPL-CCNC: 27 U/L (ref 1–32)
BILIRUB SERPL-MCNC: 0.5 MG/DL (ref 0–1.2)
BUN SERPL-MCNC: 7 MG/DL (ref 6–20)
BUN/CREAT SERPL: 9.5 (ref 7–25)
CALCIUM SPEC-SCNC: 9.9 MG/DL (ref 8.6–10.5)
CHLORIDE SERPL-SCNC: 99 MMOL/L (ref 98–107)
CO2 SERPL-SCNC: 29.5 MMOL/L (ref 22–29)
CREAT SERPL-MCNC: 0.74 MG/DL (ref 0.57–1)
EGFRCR SERPLBLD CKD-EPI 2021: 109 ML/MIN/1.73
FOLATE SERPL-MCNC: 7.81 NG/ML (ref 4.78–24.2)
GLOBULIN UR ELPH-MCNC: 2.7 GM/DL
GLUCOSE SERPL-MCNC: 86 MG/DL (ref 65–99)
IRON 24H UR-MRATE: 77 MCG/DL (ref 37–145)
IRON SATN MFR SERPL: 16 % (ref 20–50)
POTASSIUM SERPL-SCNC: 4 MMOL/L (ref 3.5–5.2)
PROT SERPL-MCNC: 7.6 G/DL (ref 6–8.5)
SODIUM SERPL-SCNC: 136 MMOL/L (ref 136–145)
TIBC SERPL-MCNC: 480 MCG/DL (ref 298–536)
TRANSFERRIN SERPL-MCNC: 322 MG/DL (ref 200–360)
TSH SERPL DL<=0.05 MIU/L-ACNC: 0.42 UIU/ML (ref 0.27–4.2)
VIT B12 BLD-MCNC: 482 PG/ML (ref 211–946)

## 2023-05-25 ENCOUNTER — APPOINTMENT (OUTPATIENT)
Dept: CT IMAGING | Facility: HOSPITAL | Age: 33
End: 2023-05-25
Payer: COMMERCIAL

## 2023-05-25 ENCOUNTER — HOSPITAL ENCOUNTER (EMERGENCY)
Facility: HOSPITAL | Age: 33
Discharge: HOME | End: 2023-05-25
Attending: EMERGENCY MEDICINE
Payer: COMMERCIAL

## 2023-05-25 ENCOUNTER — APPOINTMENT (OUTPATIENT)
Dept: GENERAL RADIOLOGY | Facility: HOSPITAL | Age: 33
End: 2023-05-25
Payer: COMMERCIAL

## 2023-05-25 VITALS
HEIGHT: 69 IN | BODY MASS INDEX: 26.96 KG/M2 | DIASTOLIC BLOOD PRESSURE: 97 MMHG | TEMPERATURE: 98.7 F | OXYGEN SATURATION: 99 % | SYSTOLIC BLOOD PRESSURE: 136 MMHG | WEIGHT: 182 LBS | HEART RATE: 82 BPM | RESPIRATION RATE: 16 BRPM

## 2023-05-25 DIAGNOSIS — S70.01XA HEMATOMA OF RIGHT HIP, INITIAL ENCOUNTER: ICD-10-CM
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
## 2023-05-25 DIAGNOSIS — S39.012A STRAIN OF LUMBAR REGION, INITIAL ENCOUNTER: ICD-10-CM

## 2023-05-25 DIAGNOSIS — S70.01XA CONTUSION OF RIGHT HIP, INITIAL ENCOUNTER: Primary | ICD-10-CM

## 2023-05-25 PROCEDURE — 72192 CT PELVIS W/O DYE: CPT

## 2023-05-25 PROCEDURE — 72100 X-RAY EXAM L-S SPINE 2/3 VWS: CPT

## 2023-05-25 PROCEDURE — 99283 EMERGENCY DEPT VISIT LOW MDM: CPT

## 2023-05-25 PROCEDURE — 63710000001 ONDANSETRON ODT 4 MG TABLET DISPERSIBLE: Performed by: PHYSICIAN ASSISTANT

## 2023-05-25 RX ORDER — OXYCODONE AND ACETAMINOPHEN 7.5; 325 MG/1; MG/1
1 TABLET ORAL ONCE
Status: COMPLETED | OUTPATIENT
Start: 2023-05-25 | End: 2023-05-25

## 2023-05-25 RX ORDER — ONDANSETRON 4 MG/1
4 TABLET, ORALLY DISINTEGRATING ORAL ONCE
Status: COMPLETED | OUTPATIENT
Start: 2023-05-25 | End: 2023-05-25

## 2023-05-25 RX ADMIN — OXYCODONE HYDROCHLORIDE AND ACETAMINOPHEN 1 TABLET: 7.5; 325 TABLET ORAL at 15:48

## 2023-05-25 RX ADMIN — ONDANSETRON 4 MG: 4 TABLET, ORALLY DISINTEGRATING ORAL at 15:48

## 2023-05-25 NOTE — ED PROVIDER NOTES
Subjective   History of Present Illness  Ms. Sosa is a 33-year-old female who presents to the emergency department with complaints of low back pain and right buttock pain after a fall down some steps in her home this morning.  The patient states that she missed a step and fell backwards, landing on her right hip.  She denies any other injury.  No loss of consciousness.  No neck or upper back pain.  No known health issues.        Review of Systems   Respiratory: Negative for cough and shortness of breath.    Cardiovascular: Negative for chest pain.   Gastrointestinal: Negative for abdominal pain.   Genitourinary: Negative for dysuria and hematuria.   Musculoskeletal: Positive for arthralgias (right hip and buttock pain) and back pain.   Skin: Negative for wound.   Neurological: Negative for numbness.   Hematological: Negative.    Psychiatric/Behavioral: Negative.        Past Medical History:   Diagnosis Date   • Anemia    • Goiter    • History of chlamydia    • History of trichomonal vaginitis    • Ovarian cyst    • Visual impairment        No Known Allergies    Past Surgical History:   Procedure Laterality Date   •  SECTION     •  SECTION     •  SECTION WITH TUBAL  2013   • DIAGNOSTIC LAPAROSCOPY N/A 2021    Procedure: DIAGNOSTIC LAPAROSCOPY WITH WASHOUT;  Surgeon: Amee Robertson MD;  Location: [facility];  Service: Obstetrics/Gynecology;  Laterality: N/A;   • LAPAROSCOPIC ASSISTED VAGINAL HYSTERECTOMY  2021    for AUB-L, pelvic pain. Ovaries retained per patient. Dr. Avani Horta.        Family History   Problem Relation Age of Onset   • Hyperlipidemia Mother    • Hypertension Mother    • Diabetes Mother    • No Known Problems Father    • Heart disease Brother    • Thyroid disease Maternal Grandmother    • Breast cancer Maternal Grandmother    • Ovarian cancer Neg Hx    • Colon cancer Neg Hx        Social History     Socioeconomic History   • Marital status:  Single   Tobacco Use   • Smoking status: Every Day     Packs/day: 0.50     Types: Cigarettes   • Smokeless tobacco: Never   • Tobacco comments:     off and on for 3 years    Vaping Use   • Vaping Use: Never used   Substance and Sexual Activity   • Alcohol use: Yes     Comment: social   • Drug use: Never   • Sexual activity: Defer           Objective   Physical Exam  Constitutional:       Comments: Appears uncomfortable, grimacing   HENT:      Head: Normocephalic.      Right Ear: External ear normal.      Left Ear: External ear normal.      Nose: Nose normal.      Mouth/Throat:      Mouth: Mucous membranes are moist.   Eyes:      General: No scleral icterus.     Conjunctiva/sclera: Conjunctivae normal.      Pupils: Pupils are equal, round, and reactive to light.   Cardiovascular:      Rate and Rhythm: Normal rate and regular rhythm.      Pulses: Normal pulses.      Heart sounds: Normal heart sounds.   Pulmonary:      Effort: Pulmonary effort is normal.      Breath sounds: Normal breath sounds.   Abdominal:      Tenderness: There is no abdominal tenderness.   Musculoskeletal:      Cervical back: Normal range of motion and neck supple.      Comments: Tenderness on palpation over the right posterior buttock.  Mild hematoma present.   Skin:     General: Skin is warm and dry.   Neurological:      General: No focal deficit present.      Mental Status: She is alert and oriented to person, place, and time.   Psychiatric:         Mood and Affect: Mood normal.         Procedures           ED Course      In summary, 33-year-old female presents with low back pain and right hip pain after a fall down steps earlier today.  Patient landed on right buttock.  She denies any other injury.  No numbness or weakness.  She did not strike her head.  No loss of consciousness.    MDM: Differential includes lumbar or pelvic fracture, hip fracture, contusion, hematoma, etc.    X-rays of the lumbar spine were obtained and a CT of the pelvis was  ordered.  The patient was given Tylenol p.o.    Lumbar spine x-rays:  No evidence of acute fracture or malalignment. No substantial degenerative changes.    CT pelvis:  1.Infiltration along subcutaneous tissues of right buttock, compatible with recent trauma.  2.No acute fracture or intra-pelvic hemorrhage identified.    16:21 EDT  I spoke with the patient about her work-up.  Her pain seems to be from a localized hematoma on the right buttock.  Patient be discharged home to apply an ice bag off-and-on and to take Tylenol as directed for pain.                                           MDM    Final diagnoses:   Contusion of right hip, initial encounter   Hematoma of right hip, initial encounter   Strain of lumbar region, initial encounter   Fall down stairs, initial encounter       ED Disposition  ED Disposition     ED Disposition   Discharge    Condition   Stable    Comment   --             Nidia Santos, PA-C  [address] [facility] [address]  [place] [address]  [phone]      As needed    [facility] Emergency Department  [address] [facility] [id number] [phone]    If symptoms worsen         Medication List      No changes were made to your prescriptions during this visit.          Avery Piña PA  05/25/23 [id number]

## 2023-05-25 NOTE — DISCHARGE INSTRUCTIONS
Rest.  Apply ice bag off-and-on as needed.  Tylenol as directed for pain.  Follow-up with your PCP or return to the emergency department if acutely worse.

## 2023-08-23 ENCOUNTER — OFFICE VISIT (OUTPATIENT)
Dept: FAMILY MEDICINE CLINIC | Facility: CLINIC | Age: 33
End: 2023-08-23
Payer: COMMERCIAL

## 2023-08-23 VITALS
BODY MASS INDEX: 27.14 KG/M2 | SYSTOLIC BLOOD PRESSURE: 124 MMHG | DIASTOLIC BLOOD PRESSURE: 74 MMHG | HEIGHT: 70 IN | TEMPERATURE: 97.8 F | HEART RATE: 77 BPM | WEIGHT: 189.6 LBS | OXYGEN SATURATION: 99 %

## 2023-08-23 DIAGNOSIS — R22.41 HIP MASS, RIGHT: ICD-10-CM

## 2023-08-23 DIAGNOSIS — M25.551 PAIN OF RIGHT HIP: Primary | ICD-10-CM

## 2023-08-23 DIAGNOSIS — S79.811S: ICD-10-CM

## 2023-08-23 PROCEDURE — 99213 OFFICE O/P EST LOW 20 MIN: CPT | Performed by: PHYSICIAN ASSISTANT

## 2023-08-23 NOTE — PROGRESS NOTES
Subjective   Anisa Sosa is a 33 y.o. female  Hip Pain (Right hip/buttock pain and swelling  since fall in May, feels like there is a mass in right buttock )      History of Present Illness  I have reviewed patient's ER note and CT scan report read by radiology from May 25, 2023 in her chart.    The patient, date of birth 1990, presents today to discuss right hip pain.    The patient conveys at the end of 05/2023, she went to the bathroom in the middle of the night and missed the first 7 steps and fell down the stairwell of 7 to 8 steps at home. She felt the most impact on the right buttock from the stair. She sat there for a while because she felt numbness down into her bilateral lower extremities.  She turned over and tried to  crawl back up the stairs, and laid in bed for a couple of hours. The pain was getting excruciating, so she went to the emergency room. They x-rayed her right hip and told her it was severely bruised. There was a lump that was large in size, and the bruising started radiating to her left buttocks. She was told it was a hematoma and it should go away in 2 months. It has been over 2 months and the lump is still there. She has been icing it and wearing compression shorts. The pain radiates all the way up to the middle of her back and sometimes it itches. The lump feels like it is detached from her buttocks. When she gets off work, it is swollen. She can move her hip, but it hurts to flex her hip up or extend it back. When she is standing and tries to stretch her hip out and stand on her left leg, it hurts in the medial gluteus. When she lays on her stomach, she feels like she can pull a piece of the lump. She denies any numbness or tingling going down the leg.    The following portions of the patient's history were reviewed and updated as appropriate: allergies, current medications, past social history and problem list    Review of Systems   Constitutional:  Positive for  activity change.   Musculoskeletal:  Positive for arthralgias, gait problem and myalgias. Negative for joint swelling.   Skin: Negative.    Neurological:  Negative for weakness and numbness.   Psychiatric/Behavioral:  Positive for sleep disturbance.      Objective     Vitals:    08/23/23 0901   BP: 124/74   Pulse: 77   Temp: 97.8 øF (36.6 øC)   SpO2: 99%       Physical Exam  Vitals and nursing note reviewed.   Constitutional:       General: She is not in acute distress.     Appearance: Normal appearance. She is well-developed. She is not ill-appearing, toxic-appearing or diaphoretic.   Cardiovascular:      Pulses: Normal pulses.   Musculoskeletal:         General: Tenderness and deformity (tender mass medial right buttock with adjacent atrophy) present. No swelling or signs of injury.      Comments: Decreased ROM right hip   Skin:     General: Skin is warm and dry.      Coloration: Skin is not pale.      Findings: No erythema or rash.   Neurological:      Mental Status: She is alert.      Motor: No abnormal muscle tone.      Coordination: Coordination normal.      Gait: Gait abnormal.       Assessment & Plan   Right hip pain  I will order an MRI of the right hip.  If she has a tear, we will refer her to an orthopedic specialist.  She may need very specific physical therapy to get this cleared.  Worst case scenario, she may need a surgeon to intervene.   Further decision in regard to care after the MRI.    Diagnoses and all orders for this visit:    1. Pain of right hip (Primary)  -     MRI Hip Right Without Contrast; Future    2. Hip mass, right  -     MRI Hip Right Without Contrast; Future    3. Blunt trauma of hip, right, sequela  -     MRI Hip Right Without Contrast; Future     I spent 15 minutes in patient care: Reviewing records prior to the visit, examining the patient, entering orders and documentation    Part of this note may be an electronic transcription/translation of spoken language to printed text using  the Dragon Dictation System.    Transcribed from ambient dictation for Nidia Santos PA-C by Esvin Berrios 08/23/23.  11:08 EDT    Patient or patient representative verbalized consent to the visit recording.  I have personally performed the services described in this document as transcribed by the above individual, and it is both accurate and complete.

## 2023-09-28 ENCOUNTER — HOSPITAL ENCOUNTER (OUTPATIENT)
Dept: MRI IMAGING | Facility: HOSPITAL | Age: 33
Discharge: HOME | End: 2023-09-28
Admitting: PHYSICIAN ASSISTANT
Payer: COMMERCIAL

## 2023-09-28 DIAGNOSIS — R22.41 HIP MASS, RIGHT: ICD-10-CM

## 2023-09-28 DIAGNOSIS — S79.811S: ICD-10-CM

## 2023-09-28 DIAGNOSIS — M25.551 PAIN OF RIGHT HIP: ICD-10-CM

## 2023-09-28 PROCEDURE — 73721 MRI JNT OF LWR EXTRE W/O DYE: CPT

## 2023-09-29 DIAGNOSIS — G89.29 CHRONIC HIP PAIN, RIGHT: Primary | ICD-10-CM

## 2023-09-29 DIAGNOSIS — M25.551 CHRONIC HIP PAIN, RIGHT: Primary | ICD-10-CM

## 2023-09-29 NOTE — PROGRESS NOTES
Please notify patient that MRI shows degenerative changes in her labrum of her hip and evidence of abnormal tendon appearance involving gluteal attachments to her hip bone possibly a partial-thickness tear.  I recommend her seeing Dr. Mera at [facility] orthopedics who specializes in hip conditions such as this I have placed the referral and they should be contacting her.

## 2024-07-24 ENCOUNTER — PATIENT ROUNDING (BHMG ONLY) (OUTPATIENT)
Dept: URGENT CARE | Facility: CLINIC | Age: 34
End: 2024-07-24
Payer: COMMERCIAL

## 2025-02-14 NOTE — PLAN OF CARE
Goal Outcome Evaluation:           Progress: improving  Outcome Summary: VSS. Patient on room air. C/o pain addressed with PRN Brea. Pain unmanageable with Norco once during the night, provider notified with new order. Patient reported relief of pain. Potassium replaced this shift. No acute events this shift, continue POC.   No

## 2025-08-05 ENCOUNTER — PATIENT ROUNDING (BHMG ONLY) (OUTPATIENT)
Dept: URGENT CARE | Facility: CLINIC | Age: 35
End: 2025-08-05
Payer: COMMERCIAL

## (undated) DEVICE — KT POSTN TRENDELENBURG NBXL PAD WING STRAP TABL HDRST XLG

## (undated) DEVICE — ENDOCUT SCISSOR TIP, DISPOSABLE: Brand: RENEW

## (undated) DEVICE — CVR HNDL LIGHT RIGID

## (undated) DEVICE — FLTR PLUMEPORT LAP W/CONN STRL

## (undated) DEVICE — ANTIBACTERIAL UNDYED BRAIDED (POLYGLACTIN 910), SYNTHETIC ABSORBABLE SUTURE: Brand: COATED VICRYL

## (undated) DEVICE — ADHS SKIN PREMIERPRO EXOFIN TOPICAL HI/VISC .5ML

## (undated) DEVICE — APPL CHLORAPREP TINTED 26ML TEAL

## (undated) DEVICE — LEX GYN MINOR LAPAROSCOPY: Brand: MEDLINE INDUSTRIES, INC.

## (undated) DEVICE — ENDOPATH XCEL UNIVERSAL TROCAR STABLILITY SLEEVES: Brand: ENDOPATH XCEL

## (undated) DEVICE — GLV SURG SENSICARE PI MIC PF SZ6 LF STRL